# Patient Record
Sex: FEMALE | Race: WHITE | HISPANIC OR LATINO | Employment: OTHER | URBAN - METROPOLITAN AREA
[De-identification: names, ages, dates, MRNs, and addresses within clinical notes are randomized per-mention and may not be internally consistent; named-entity substitution may affect disease eponyms.]

---

## 2019-09-03 ENCOUNTER — TRANSFERRED RECORDS (OUTPATIENT)
Dept: HEALTH INFORMATION MANAGEMENT | Facility: CLINIC | Age: 69
End: 2019-09-03

## 2021-07-21 ENCOUNTER — TRANSFERRED RECORDS (OUTPATIENT)
Dept: HEALTH INFORMATION MANAGEMENT | Facility: CLINIC | Age: 71
End: 2021-07-21
Payer: COMMERCIAL

## 2021-07-21 ENCOUNTER — APPOINTMENT (OUTPATIENT)
Dept: INTERNAL MEDICINE | Facility: CLINIC | Age: 71
End: 2021-07-21
Payer: MEDICARE

## 2021-07-21 VITALS
OXYGEN SATURATION: 98 % | HEIGHT: 57 IN | TEMPERATURE: 96.7 F | HEART RATE: 78 BPM | BODY MASS INDEX: 26.75 KG/M2 | WEIGHT: 124 LBS

## 2021-07-21 DIAGNOSIS — Z86.69 PERSONAL HISTORY OF OTHER DISEASES OF THE NERVOUS SYSTEM AND SENSE ORGANS: ICD-10-CM

## 2021-07-21 DIAGNOSIS — Z78.9 OTHER SPECIFIED HEALTH STATUS: ICD-10-CM

## 2021-07-21 DIAGNOSIS — Z83.3 FAMILY HISTORY OF DIABETES MELLITUS: ICD-10-CM

## 2021-07-21 DIAGNOSIS — Z00.00 ENCOUNTER FOR GENERAL ADULT MEDICAL EXAMINATION W/OUT ABNORMAL FINDINGS: ICD-10-CM

## 2021-07-21 DIAGNOSIS — Z86.39 PERSONAL HISTORY OF OTHER ENDOCRINE, NUTRITIONAL AND METABOLIC DISEASE: ICD-10-CM

## 2021-07-21 DIAGNOSIS — J06.9 ACUTE UPPER RESPIRATORY INFECTION, UNSPECIFIED: ICD-10-CM

## 2021-07-21 DIAGNOSIS — L30.9 DERMATITIS, UNSPECIFIED: ICD-10-CM

## 2021-07-21 PROCEDURE — 99072 ADDL SUPL MATRL&STAF TM PHE: CPT

## 2021-07-21 PROCEDURE — 99205 OFFICE O/P NEW HI 60 MIN: CPT | Mod: 25

## 2021-07-21 PROCEDURE — 36415 COLL VENOUS BLD VENIPUNCTURE: CPT

## 2021-07-21 PROCEDURE — 93000 ELECTROCARDIOGRAM COMPLETE: CPT | Mod: 59

## 2021-07-21 RX ORDER — CEPHALEXIN 500 MG/1
500 CAPSULE ORAL TWICE DAILY
Refills: 0 | Status: DISCONTINUED | COMMUNITY

## 2021-07-21 RX ORDER — TRIAMCINOLONE ACETONIDE 1 MG/G
0.1 CREAM TOPICAL TWICE DAILY
Qty: 1 | Refills: 2 | Status: ACTIVE | COMMUNITY
Start: 2021-07-21 | End: 1900-01-01

## 2021-07-22 VITALS — SYSTOLIC BLOOD PRESSURE: 126 MMHG | DIASTOLIC BLOOD PRESSURE: 80 MMHG

## 2021-07-22 PROBLEM — J06.9 UPPER RESPIRATORY TRACT INFECTION: Status: ACTIVE | Noted: 2021-07-21

## 2021-07-22 PROBLEM — Z86.69 HISTORY OF PARKINSON'S DISEASE: Status: RESOLVED | Noted: 2021-07-22 | Resolved: 2021-07-22

## 2021-07-22 PROBLEM — Z86.39 HISTORY OF TYPE 2 DIABETES MELLITUS: Status: RESOLVED | Noted: 2021-07-21 | Resolved: 2021-07-22

## 2021-07-22 PROBLEM — Z78.9 CONSUMES ALCOHOL AT SOCIAL EVENTS: Status: ACTIVE | Noted: 2021-07-21

## 2021-07-22 PROBLEM — Z86.39 HISTORY OF THYROID DISORDER: Status: RESOLVED | Noted: 2021-07-21 | Resolved: 2021-07-22

## 2021-07-22 PROBLEM — Z83.3 FAMILY HISTORY OF DIABETES MELLITUS: Status: ACTIVE | Noted: 2021-07-21

## 2021-07-22 PROBLEM — L30.9 DERMATITIS: Status: ACTIVE | Noted: 2021-07-21

## 2021-07-22 RX ORDER — CARBIDOPA AND LEVODOPA 25; 100 MG/1; MG/1
25-100 TABLET, EXTENDED RELEASE ORAL
Qty: 60 | Refills: 0 | Status: ACTIVE | COMMUNITY
Start: 2021-03-12

## 2021-07-22 RX ORDER — ROSUVASTATIN CALCIUM 5 MG/1
5 TABLET, FILM COATED ORAL
Refills: 0 | Status: ACTIVE | COMMUNITY

## 2021-07-22 NOTE — HEALTH RISK ASSESSMENT
[Fair] : ~his/her~ current health as fair  [Good] : ~his/her~  mood as  good [Intercurrent ED visits] : went to ED [Yes] : Yes [Monthly or less (1 pt)] : Monthly or less (1 point) [1 or 2 (0 pts)] : 1 or 2 (0 points) [Never (0 pts)] : Never (0 points) [No] : In the past 12 months have you used drugs other than those required for medical reasons? No [No falls in past year] : Patient reported no falls in the past year [0] : 2) Feeling down, depressed, or hopeless: Not at all (0) [Patient reported mammogram was normal] : Patient reported mammogram was normal [Patient reported PAP Smear was normal] : Patient reported PAP Smear was normal [Patient reported colonoscopy was normal] : Patient reported colonoscopy was normal [HIV test declined] : HIV test declined [Hepatitis C test declined] : Hepatitis C test declined [Alone] : lives alone [Retired] : retired [] :  [Smoke Detector] : smoke detector [Carbon Monoxide Detector] : carbon monoxide detector [Seat Belt] :  uses seat belt [Sunscreen] : uses sunscreen [] : No [de-identified] : 07/07/2021 [de-identified] : No [de-identified] : wine [Audit-CScore] : 1 [de-identified] : None [de-identified] : Healthy  [[id number]] : 0 [Reports changes in hearing] : Reports no changes in hearing [Reports changes in vision] : Reports no changes in vision [Reports changes in dental health] : Reports no changes in dental health [MammogramDate] : 06/20 [PapSmearDate] : 04/21 [ColonoscopyDate] : 03/21

## 2021-07-22 NOTE — PHYSICAL EXAM
[No Acute Distress] : no acute distress [Normal Oropharynx] : the oropharynx was normal [No JVD] : no jugular venous distention [No Accessory Muscle Use] : no accessory muscle use [Clear to Auscultation] : lungs were clear to auscultation bilaterally [Regular Rhythm] : with a regular rhythm [Normal S1, S2] : normal S1 and S2 [No Edema] : there was no peripheral edema [No Masses] : no palpable masses [Soft] : abdomen soft [Non Tender] : non-tender [No CVA Tenderness] : no CVA  tenderness [No Joint Swelling] : no joint swelling [No Focal Deficits] : no focal deficits [Alert and Oriented x3] : oriented to person, place, and time [de-identified] : appears anxious, pressured speech [de-identified] : mild pdaryngeal erythema [de-identified] : multiple oval superficial skin ulcers arms and legs

## 2021-07-22 NOTE — REVIEW OF SYSTEMS
[Earache] : earache [Memory Loss] : memory loss [Anxiety] : anxiety [Fever] : no fever [Chest Pain] : no chest pain [Shortness Of Breath] : no shortness of breath [Abdominal Pain] : no abdominal pain [Dysuria] : no dysuria [Unsteady Walking] : no ataxia [FreeTextEntry4] : went to ENT no clear diagnosis

## 2021-07-22 NOTE — PLAN
[FreeTextEntry1] : blood sent for routine labs\par complex history will ask for old records from Dr Chau

## 2021-07-22 NOTE — HISTORY OF PRESENT ILLNESS
[FreeTextEntry1] : former patient Dr Chau wishes to transfer care [de-identified] : 69 yo female taking multiple meds however not completely clear which meds actually taking\par there is a prior hx diabetes on lantus/trulicity, Parkinson,s on sinemet,hypothyrodism on synthroid.\par multiple complaints mostly center on upper resp infection for whlch was given keflex and claritin apparently at Urgent Care

## 2021-07-22 NOTE — ASSESSMENT
[FreeTextEntry1] : complicated history\par called pharmacy re list of meds however not completely which meds actually taking.\par today requests refill triamcinolone cream given for rash arms and legs[will be seeing dermatologist within next few days]  \par currently appears stable  thinking with loose associations ? PRIOR PSYCH DIAGNOSIS

## 2021-07-25 LAB
ALBUMIN SERPL ELPH-MCNC: 4.2 G/DL
ALP BLD-CCNC: 121 U/L
ALT SERPL-CCNC: 25 U/L
ANION GAP SERPL CALC-SCNC: 12 MMOL/L
AST SERPL-CCNC: 21 U/L
BASOPHILS # BLD AUTO: 0.06 K/UL
BASOPHILS NFR BLD AUTO: 1.1 %
BILIRUB SERPL-MCNC: 0.6 MG/DL
BUN SERPL-MCNC: 12 MG/DL
CALCIUM SERPL-MCNC: 8.9 MG/DL
CHLORIDE SERPL-SCNC: 104 MMOL/L
CHOLEST SERPL-MCNC: 163 MG/DL
CO2 SERPL-SCNC: 23 MMOL/L
CREAT SERPL-MCNC: 0.54 MG/DL
EOSINOPHIL # BLD AUTO: 0.14 K/UL
EOSINOPHIL NFR BLD AUTO: 2.5 %
ESTIMATED AVERAGE GLUCOSE: 226 MG/DL
GLUCOSE SERPL-MCNC: 208 MG/DL
HBA1C MFR BLD HPLC: 9.5 %
HCT VFR BLD CALC: 39.6 %
HDLC SERPL-MCNC: 55 MG/DL
HGB BLD-MCNC: 11.9 G/DL
IMM GRANULOCYTES NFR BLD AUTO: 0.2 %
LDLC SERPL CALC-MCNC: 74 MG/DL
LYMPHOCYTES # BLD AUTO: 1.73 K/UL
LYMPHOCYTES NFR BLD AUTO: 31.3 %
MAN DIFF?: NORMAL
MCHC RBC-ENTMCNC: 28.5 PG
MCHC RBC-ENTMCNC: 30.1 GM/DL
MCV RBC AUTO: 94.7 FL
MONOCYTES # BLD AUTO: 0.33 K/UL
MONOCYTES NFR BLD AUTO: 6 %
NEUTROPHILS # BLD AUTO: 3.25 K/UL
NEUTROPHILS NFR BLD AUTO: 58.9 %
NONHDLC SERPL-MCNC: 108 MG/DL
PLATELET # BLD AUTO: 230 K/UL
POTASSIUM SERPL-SCNC: 3.5 MMOL/L
PROT SERPL-MCNC: 7.1 G/DL
RBC # BLD: 4.18 M/UL
RBC # FLD: 12.7 %
SODIUM SERPL-SCNC: 139 MMOL/L
TRIGL SERPL-MCNC: 169 MG/DL
TSH SERPL-ACNC: 1.45 UIU/ML
WBC # FLD AUTO: 5.52 K/UL

## 2021-08-20 ENCOUNTER — APPOINTMENT (OUTPATIENT)
Dept: INTERNAL MEDICINE | Facility: CLINIC | Age: 71
End: 2021-08-20
Payer: MEDICARE

## 2021-08-20 VITALS — HEART RATE: 88 BPM | BODY MASS INDEX: 25.54 KG/M2 | WEIGHT: 118 LBS | OXYGEN SATURATION: 98 %

## 2021-08-20 DIAGNOSIS — Z86.59 PERSONAL HISTORY OF OTHER MENTAL AND BEHAVIORAL DISORDERS: ICD-10-CM

## 2021-08-20 PROCEDURE — 36415 COLL VENOUS BLD VENIPUNCTURE: CPT

## 2021-08-20 PROCEDURE — 99214 OFFICE O/P EST MOD 30 MIN: CPT | Mod: 25

## 2021-08-22 PROBLEM — Z86.59 HISTORY OF DEPRESSION: Status: RESOLVED | Noted: 2021-07-21 | Resolved: 2021-08-22

## 2021-08-22 NOTE — PLAN
[FreeTextEntry1] : blood sent for routine to include A1C\par To get CT abd/pelvis to assess etiology weight loss and abd pain

## 2021-08-22 NOTE — ASSESSMENT
[FreeTextEntry1] : multiple issues raised\par most prominent being weight loss and intermittent LLQ abd pain\par colonoscopy one year about was reportedly normal

## 2021-08-22 NOTE — REVIEW OF SYSTEMS
[Anxiety] : anxiety [Depression] : depression [Fever] : no fever [Earache] : no earache [Chest Pain] : no chest pain [Shortness Of Breath] : no shortness of breath [Abdominal Pain] : no abdominal pain [Nausea] : no nausea [Constipation] : no constipation [Joint Pain] : no joint pain [Headache] : no headache [Dizziness] : no dizziness

## 2021-08-22 NOTE — PHYSICAL EXAM
[No Acute Distress] : no acute distress [No JVD] : no jugular venous distention [Clear to Auscultation] : lungs were clear to auscultation bilaterally [Regular Rhythm] : with a regular rhythm [No Edema] : there was no peripheral edema [Soft] : abdomen soft [Non Tender] : non-tender [Non-distended] : non-distended [No Masses] : no abdominal mass palpated [Normal Supraclavicular Nodes] : no supraclavicular lymphadenopathy [No CVA Tenderness] : no CVA  tenderness [Normal Gait] : normal gait [Alert and Oriented x3] : oriented to person, place, and time

## 2021-08-22 NOTE — HISTORY OF PRESENT ILLNESS
----- Message from Jigar Barr sent at 6/14/2021 10:09 AM CDT -----  Regarding: OSFR  Outside Film Request    Patient Name:  Ronak Ovalle  MRN:  [id number]    Type of Images/Reports requested:  X-Ray  Approximate date of prior imaging:10-13-14, CXR       Prior Imaging Facility:[facility]    Thank you! [FreeTextEntry1] : Pt. stated that she is experiencing lower abdominal pain since last week and the pain comes and goes. Also she stated that both of her eyes are itching and she doesn't believe that it is allergies. So she will explain to the doctor. [de-identified] : returns for routine f/u. \par concerned about unintentional weight loss lost weight since last visit\par no associated nausea,vomiting. as noted had LLQ abdominal pain however this has currently recolved\par

## 2021-08-25 LAB
ALBUMIN SERPL ELPH-MCNC: 4.4 G/DL
ALP BLD-CCNC: 117 U/L
ALT SERPL-CCNC: 23 U/L
ANION GAP SERPL CALC-SCNC: 11 MMOL/L
AST SERPL-CCNC: 21 U/L
BASOPHILS # BLD AUTO: 0.04 K/UL
BASOPHILS NFR BLD AUTO: 0.6 %
BILIRUB SERPL-MCNC: 0.5 MG/DL
BUN SERPL-MCNC: 16 MG/DL
CALCIUM SERPL-MCNC: 9 MG/DL
CHLORIDE SERPL-SCNC: 107 MMOL/L
CHOLEST SERPL-MCNC: 127 MG/DL
CO2 SERPL-SCNC: 23 MMOL/L
CREAT SERPL-MCNC: 0.49 MG/DL
EOSINOPHIL # BLD AUTO: 0.09 K/UL
EOSINOPHIL NFR BLD AUTO: 1.4 %
ESTIMATED AVERAGE GLUCOSE: 197 MG/DL
GLUCOSE SERPL-MCNC: 115 MG/DL
HBA1C MFR BLD HPLC: 8.5 %
HCT VFR BLD CALC: 39.8 %
HDLC SERPL-MCNC: 54 MG/DL
HGB BLD-MCNC: 12.1 G/DL
IMM GRANULOCYTES NFR BLD AUTO: 0.3 %
LDLC SERPL CALC-MCNC: 46 MG/DL
LYMPHOCYTES # BLD AUTO: 2.15 K/UL
LYMPHOCYTES NFR BLD AUTO: 32.4 %
MAN DIFF?: NORMAL
MCHC RBC-ENTMCNC: 28.7 PG
MCHC RBC-ENTMCNC: 30.4 GM/DL
MCV RBC AUTO: 94.5 FL
MONOCYTES # BLD AUTO: 0.37 K/UL
MONOCYTES NFR BLD AUTO: 5.6 %
NEUTROPHILS # BLD AUTO: 3.96 K/UL
NEUTROPHILS NFR BLD AUTO: 59.7 %
NONHDLC SERPL-MCNC: 73 MG/DL
PLATELET # BLD AUTO: 229 K/UL
POTASSIUM SERPL-SCNC: 3.6 MMOL/L
PROT SERPL-MCNC: 7.4 G/DL
RBC # BLD: 4.21 M/UL
RBC # FLD: 13.6 %
SODIUM SERPL-SCNC: 142 MMOL/L
TRIGL SERPL-MCNC: 133 MG/DL
TSH SERPL-ACNC: 0.06 UIU/ML
WBC # FLD AUTO: 6.63 K/UL

## 2021-10-05 ENCOUNTER — TRANSFERRED RECORDS (OUTPATIENT)
Dept: HEALTH INFORMATION MANAGEMENT | Facility: CLINIC | Age: 71
End: 2021-10-05
Payer: COMMERCIAL

## 2021-10-08 ENCOUNTER — TRANSFERRED RECORDS (OUTPATIENT)
Dept: HEALTH INFORMATION MANAGEMENT | Facility: CLINIC | Age: 71
End: 2021-10-08
Payer: COMMERCIAL

## 2021-10-08 ENCOUNTER — APPOINTMENT (OUTPATIENT)
Dept: INTERNAL MEDICINE | Facility: CLINIC | Age: 71
End: 2021-10-08
Payer: MEDICARE

## 2021-10-08 VITALS
OXYGEN SATURATION: 98 % | WEIGHT: 116 LBS | HEIGHT: 57 IN | HEART RATE: 79 BPM | DIASTOLIC BLOOD PRESSURE: 71 MMHG | TEMPERATURE: 97.5 F | SYSTOLIC BLOOD PRESSURE: 176 MMHG | BODY MASS INDEX: 25.03 KG/M2

## 2021-10-08 DIAGNOSIS — E03.9 HYPOTHYROIDISM, UNSPECIFIED: ICD-10-CM

## 2021-10-08 DIAGNOSIS — I10 ESSENTIAL (PRIMARY) HYPERTENSION: ICD-10-CM

## 2021-10-08 DIAGNOSIS — R63.4 ABNORMAL WEIGHT LOSS: ICD-10-CM

## 2021-10-08 DIAGNOSIS — F41.9 ANXIETY DISORDER, UNSPECIFIED: ICD-10-CM

## 2021-10-08 DIAGNOSIS — E11.9 TYPE 2 DIABETES MELLITUS W/OUT COMPLICATIONS: ICD-10-CM

## 2021-10-08 PROCEDURE — 99215 OFFICE O/P EST HI 40 MIN: CPT

## 2021-10-08 RX ORDER — INSULIN GLARGINE 100 [IU]/ML
100 INJECTION, SOLUTION SUBCUTANEOUS DAILY
Qty: 3 | Refills: 0 | Status: ACTIVE | COMMUNITY
Start: 2021-07-21 | End: 1900-01-01

## 2021-10-08 RX ORDER — LEVOTHYROXINE SODIUM 0.1 MG/1
100 TABLET ORAL DAILY
Qty: 90 | Refills: 1 | Status: ACTIVE | COMMUNITY
Start: 2021-07-21 | End: 1900-01-01

## 2021-10-08 RX ORDER — CIPROFLOXACIN 3 MG/ML
0.3 SOLUTION OPHTHALMIC TWICE DAILY
Qty: 1 | Refills: 0 | Status: ACTIVE | COMMUNITY
Start: 2021-10-08 | End: 1900-01-01

## 2021-10-08 RX ORDER — AMLODIPINE BESYLATE 5 MG/1
5 TABLET ORAL DAILY
Qty: 60 | Refills: 3 | Status: ACTIVE | COMMUNITY
Start: 2021-10-08 | End: 1900-01-01

## 2021-10-08 RX ORDER — LEVOTHYROXINE SODIUM 0.1 MG/1
100 TABLET ORAL DAILY
Qty: 90 | Refills: 1 | Status: ACTIVE | COMMUNITY
Start: 2021-10-08 | End: 1900-01-01

## 2021-10-09 PROBLEM — F41.9 ANXIETY: Status: ACTIVE | Noted: 2021-08-22

## 2021-10-09 PROBLEM — R63.4 WEIGHT LOSS, UNINTENTIONAL: Status: ACTIVE | Noted: 2021-08-20

## 2021-10-09 PROBLEM — E11.9 CONTROLLED DIABETES MELLITUS: Status: ACTIVE | Noted: 2021-08-20

## 2021-10-09 RX ORDER — LANCETS 33 GAUGE
EACH MISCELLANEOUS
Qty: 100 | Refills: 0 | Status: ACTIVE | COMMUNITY
Start: 2021-04-20

## 2021-10-09 RX ORDER — TRIAMCINOLONE ACETONIDE 0.25 MG/G
0.03 CREAM TOPICAL
Qty: 80 | Refills: 0 | Status: ACTIVE | COMMUNITY
Start: 2021-08-13

## 2021-10-09 RX ORDER — BLOOD SUGAR DIAGNOSTIC
STRIP MISCELLANEOUS
Qty: 100 | Refills: 0 | Status: ACTIVE | COMMUNITY
Start: 2021-04-20

## 2021-10-09 RX ORDER — PEN NEEDLE, DIABETIC 32GX 5/32"
32G X 4 MM NEEDLE, DISPOSABLE MISCELLANEOUS
Qty: 100 | Refills: 0 | Status: ACTIVE | COMMUNITY
Start: 2021-05-19

## 2021-10-09 RX ORDER — BETAMETHASONE DIPROPIONATE 0.5 MG/G
0.05 CREAM TOPICAL
Qty: 45 | Refills: 0 | Status: ACTIVE | COMMUNITY
Start: 2021-09-03

## 2021-10-09 RX ORDER — ERTUGLIFLOZIN 5 MG/1
5 TABLET, FILM COATED ORAL
Qty: 30 | Refills: 0 | Status: ACTIVE | COMMUNITY
Start: 2021-03-12

## 2021-10-09 NOTE — REVIEW OF SYSTEMS
[Recent Change In Weight] : ~T recent weight change [Anxiety] : anxiety [Depression] : depression [Fever] : no fever [Night Sweats] : no night sweats [Earache] : no earache [Chest Pain] : no chest pain [Shortness Of Breath] : no shortness of breath [Abdominal Pain] : no abdominal pain [Nausea] : no nausea [Constipation] : no constipation [Joint Pain] : no joint pain [Headache] : no headache [Dizziness] : no dizziness

## 2021-10-09 NOTE — ASSESSMENT
[FreeTextEntry1] : weight loss possibly related to iatrogenic hyperthyroidism-dose of synthroid was decresased\par recent mammo/CT a/p were essentially negative\par to see GI re ? abn upper stomach thickening on CT scan

## 2021-10-09 NOTE — HISTORY OF PRESENT ILLNESS
[FreeTextEntry1] : Returns for usual followup has no specific medical complaints, taking medications as noted.\par  [de-identified] : 2 pound weight loss last month states has been eating well\par appears anxious no interval change in status otherwise\par recent mammogram CT [a/p] were negative

## 2021-10-09 NOTE — PLAN
[FreeTextEntry1] : to lower dose synthroid to 100mcg/day\par aware need for short interval follow-up

## 2021-10-09 NOTE — HEALTH RISK ASSESSMENT
[Intercurrent ED visits] : went to ED [No] : In the past 12 months have you used drugs other than those required for medical reasons? No [No falls in past year] : Patient reported no falls in the past year [0] : 2) Feeling down, depressed, or hopeless: Not at all (0) [] : No [de-identified] : 09/03/2021 [de-identified] : None [de-identified] : Regular  [[id number]] : 0

## 2021-11-09 ENCOUNTER — TELEPHONE (OUTPATIENT)
Dept: BEHAVIORAL HEALTH | Facility: CLINIC | Age: 71
End: 2021-11-09
Payer: COMMERCIAL

## 2021-11-09 ENCOUNTER — HOSPITAL ENCOUNTER (EMERGENCY)
Facility: CLINIC | Age: 71
Discharge: PSYCHIATRIC OR SUBSTANCE USE FACILITY | End: 2021-11-09
Attending: EMERGENCY MEDICINE | Admitting: EMERGENCY MEDICINE
Payer: COMMERCIAL

## 2021-11-09 ENCOUNTER — HOSPITAL ENCOUNTER (INPATIENT)
Facility: CLINIC | Age: 71
LOS: 23 days | Discharge: HOME | DRG: 885 | End: 2021-12-02
Attending: PSYCHIATRY & NEUROLOGY | Admitting: PSYCHIATRY & NEUROLOGY
Payer: COMMERCIAL

## 2021-11-09 VITALS
RESPIRATION RATE: 16 BRPM | HEART RATE: 73 BPM | WEIGHT: 113.54 LBS | DIASTOLIC BLOOD PRESSURE: 79 MMHG | TEMPERATURE: 97.2 F | OXYGEN SATURATION: 99 % | SYSTOLIC BLOOD PRESSURE: 136 MMHG

## 2021-11-09 DIAGNOSIS — E03.9 ACQUIRED HYPOTHYROIDISM: ICD-10-CM

## 2021-11-09 DIAGNOSIS — F20.0 PARANOID SCHIZOPHRENIA, CHRONIC CONDITION WITH ACUTE EXACERBATION (H): Primary | ICD-10-CM

## 2021-11-09 DIAGNOSIS — Z79.4 TYPE 2 DIABETES MELLITUS WITHOUT COMPLICATION, WITH LONG-TERM CURRENT USE OF INSULIN (H): ICD-10-CM

## 2021-11-09 DIAGNOSIS — E78.5 HYPERLIPIDEMIA LDL GOAL <130: ICD-10-CM

## 2021-11-09 DIAGNOSIS — E11.9 TYPE 2 DIABETES MELLITUS WITHOUT COMPLICATION, WITH LONG-TERM CURRENT USE OF INSULIN (H): ICD-10-CM

## 2021-11-09 DIAGNOSIS — F20.0 PARANOID SCHIZOPHRENIA (H): ICD-10-CM

## 2021-11-09 DIAGNOSIS — I10 PRIMARY HYPERTENSION: ICD-10-CM

## 2021-11-09 PROBLEM — F29 PSYCHOSIS (H): Status: ACTIVE | Noted: 2021-11-09

## 2021-11-09 LAB
AMPHETAMINES UR QL SCN: NORMAL
BARBITURATES UR QL: NORMAL
BENZODIAZ UR QL: NORMAL
CANNABINOIDS UR QL SCN: NORMAL
COCAINE UR QL: NORMAL
OPIATES UR QL SCN: NORMAL
SARS-COV-2 RNA RESP QL NAA+PROBE: NEGATIVE

## 2021-11-09 PROCEDURE — 96372 THER/PROPH/DIAG INJ SC/IM: CPT | Performed by: EMERGENCY MEDICINE

## 2021-11-09 PROCEDURE — 99285 EMERGENCY DEPT VISIT HI MDM: CPT | Mod: 25

## 2021-11-09 PROCEDURE — 90791 PSYCH DIAGNOSTIC EVALUATION: CPT

## 2021-11-09 PROCEDURE — C9803 HOPD COVID-19 SPEC COLLECT: HCPCS

## 2021-11-09 PROCEDURE — 250N000011 HC RX IP 250 OP 636: Performed by: EMERGENCY MEDICINE

## 2021-11-09 PROCEDURE — 128N000001 HC R&B CD/MH ADULT

## 2021-11-09 PROCEDURE — 250N000013 HC RX MED GY IP 250 OP 250 PS 637: Performed by: EMERGENCY MEDICINE

## 2021-11-09 PROCEDURE — 80307 DRUG TEST PRSMV CHEM ANLYZR: CPT | Performed by: EMERGENCY MEDICINE

## 2021-11-09 PROCEDURE — 87635 SARS-COV-2 COVID-19 AMP PRB: CPT | Performed by: EMERGENCY MEDICINE

## 2021-11-09 RX ORDER — OLANZAPINE 5 MG/1
5 TABLET ORAL ONCE
Status: DISCONTINUED | OUTPATIENT
Start: 2021-11-09 | End: 2021-11-09 | Stop reason: HOSPADM

## 2021-11-09 RX ORDER — OLANZAPINE 10 MG/2ML
10 INJECTION, POWDER, FOR SOLUTION INTRAMUSCULAR ONCE
Status: COMPLETED | OUTPATIENT
Start: 2021-11-09 | End: 2021-11-09

## 2021-11-09 RX ORDER — GABAPENTIN 100 MG/1
100 CAPSULE ORAL EVERY 6 HOURS PRN
Status: DISCONTINUED | OUTPATIENT
Start: 2021-11-09 | End: 2021-12-02 | Stop reason: HOSPADM

## 2021-11-09 RX ORDER — OLANZAPINE 5 MG/1
10 TABLET, ORALLY DISINTEGRATING ORAL ONCE
Status: COMPLETED | OUTPATIENT
Start: 2021-11-09 | End: 2021-11-09

## 2021-11-09 RX ORDER — LORAZEPAM 1 MG/1
1 TABLET ORAL ONCE
Status: DISCONTINUED | OUTPATIENT
Start: 2021-11-09 | End: 2021-11-09 | Stop reason: HOSPADM

## 2021-11-09 RX ORDER — OLANZAPINE 10 MG/2ML
5 INJECTION, POWDER, FOR SOLUTION INTRAMUSCULAR 3 TIMES DAILY PRN
Status: DISCONTINUED | OUTPATIENT
Start: 2021-11-09 | End: 2021-12-02 | Stop reason: HOSPADM

## 2021-11-09 RX ORDER — OLANZAPINE 5 MG/1
5 TABLET ORAL 3 TIMES DAILY PRN
Status: DISCONTINUED | OUTPATIENT
Start: 2021-11-09 | End: 2021-12-02 | Stop reason: HOSPADM

## 2021-11-09 RX ORDER — MAGNESIUM HYDROXIDE/ALUMINUM HYDROXICE/SIMETHICONE 120; 1200; 1200 MG/30ML; MG/30ML; MG/30ML
30 SUSPENSION ORAL EVERY 4 HOURS PRN
Status: DISCONTINUED | OUTPATIENT
Start: 2021-11-09 | End: 2021-12-02 | Stop reason: HOSPADM

## 2021-11-09 RX ORDER — TRAZODONE HYDROCHLORIDE 50 MG/1
50 TABLET, FILM COATED ORAL
Status: DISCONTINUED | OUTPATIENT
Start: 2021-11-09 | End: 2021-12-02 | Stop reason: HOSPADM

## 2021-11-09 RX ORDER — POLYETHYLENE GLYCOL 3350 17 G/17G
17 POWDER, FOR SOLUTION ORAL DAILY PRN
Status: DISCONTINUED | OUTPATIENT
Start: 2021-11-09 | End: 2021-12-02 | Stop reason: HOSPADM

## 2021-11-09 RX ORDER — ACETAMINOPHEN 325 MG/1
650 TABLET ORAL EVERY 4 HOURS PRN
Status: DISCONTINUED | OUTPATIENT
Start: 2021-11-09 | End: 2021-12-02 | Stop reason: HOSPADM

## 2021-11-09 RX ADMIN — OLANZAPINE 10 MG: 10 INJECTION, POWDER, FOR SOLUTION INTRAMUSCULAR at 20:44

## 2021-11-09 RX ADMIN — OLANZAPINE 5 MG: 5 TABLET, ORALLY DISINTEGRATING ORAL at 11:59

## 2021-11-09 RX ADMIN — OLANZAPINE 10 MG: 10 INJECTION, POWDER, FOR SOLUTION INTRAMUSCULAR at 15:36

## 2021-11-09 NOTE — TELEPHONE ENCOUNTER
R: 28[facility] ED: [phone]    514pm - Intake called ED, ensured that the pt will be on [facility]   553pm - Larry, on call provider, accepts   Pt placed in queue   602pm - unit charge notified, 730pm for report   605pm - ED notified

## 2021-11-09 NOTE — ED NOTES
Patient's brother in law, Sarbjit, left the bedside to go home. He requests that he be called for an update if patient transfers to a facility this evening. Sarbjit Marge: [phone]

## 2021-11-09 NOTE — ED PROVIDER NOTES
History   Chief Complaint:  No chief complaint on file.       HPI   Linnette Monsivais is a 71 year old female with a longstanding history of paranoid schizophrenia who arrives with brother-in-law for psychiatric evaluation.  He is reporting that she has been noncompliant with her medication since this spring and has been increasingly erratic and paranoid.  She lives in [place] and he arranged to have her come to [place] to assist with her mental health condition.  He is reporting that she is exhibiting bizarre and paranoid behavior, for instance walking around the house with a  knife and the lights on apparently concerned about intruders and too afraid to go to sleep.    Review of Systems   Unable to perform ROS: Psychiatric disorder     Allergies:  The patient has no known allergies.     Medications:  Noncompliant with medications    Past Medical History:     Paranoid schizophrenia    Social History:  The patient presents to the ED with brother in law.   Lives in [place].     Physical Exam     Patient Vitals for the past 24 hrs:   BP Temp Pulse Resp SpO2   11/09/21 1104 -- 97.2  F (36.2  C) -- -- --   11/09/21 1102 (!) 194/94 -- 92 -- 99 %   11/09/21 1100 -- -- 60 18 99 %       Physical Exam  General: Patient is alert and cooperative.  HENT:  No trauma.   Eyes: EOMI. Normal conjunctiva.  Neck:  Normal range of motion and appearance.   Cardiovascular:  Normal rate.   Pulmonary/Chest:  Effort normal  Abdominal: Soft. No distension or tenderness.     Musculoskeletal: Normal range of motion. No edema or tenderness.   Neurological: normal strength, sensation, and coordination.   Skin: Warm and dry. No rash or bruising.   Psychiatric: paranoid, erratic behavior    Emergency Department Course       Laboratory:      Emergency Department Course:  Reviewed:  I reviewed nursing notes and vitals    Assessments:  I obtained history and examined the patient as noted above.     I rechecked the patient and  explained findings.     Consults:  DEC    Interventions:  Medications   OLANZapine zydis (zyPREXA) ODT tab 10 mg (5 mg Oral Given 21 115)       Disposition:  Plan transfer inpatient Psych    Impression & Plan   Medical Decision Makin-year-old female with a history of paranoid schizophrenia has been noncompliant with medications and has presented in a decompensated state. She has been placed on CAN, medicated with oral Zyprexa, and a virtual DEC consultation was performed today recommended inpatient management and arrangements are underway for placement.    Diagnosis:    ICD-10-CM    1. Paranoid schizophrenia (H)  F20.0          Scribe Disclosure:  I, Marek Knutson, am serving as a scribe at 11:13 AM on 2021 to document services personally performed by Yuri Mercado MD based on my observations and the provider's statements to me.             Yuri Mercado MD  [id number]       Yuri Mercado MD  [id number]

## 2021-11-09 NOTE — ED NOTES
"Pt agitated, coming to the door, states, \"I need to leave now.\" Pt paranoid, states someone is following her.  Pt verbalizes a fear of being left alone. Pt requested that COVID swab be opened in front of her because she fears that RN put something on the q-tip. Brother and law and RN redirecting patient, using verbal de-escalation.   "

## 2021-11-09 NOTE — ED TRIAGE NOTES
Pt presents to ED with paranoia. Brother in law states they picked her up from [place] due to symptoms.   Walking around the house at night with a  knife, due to fear that someone is coming to get her. Checking doors all the time and turning the lights on. States she is not sleeping because she is afraid.

## 2021-11-09 NOTE — ED NOTES
Pt became increasingly agitated. Verbally threatening to leave, attempted to push through brother in law to leave the room. Dr. Cole notified. IM zyprexa given. Pt calm after medication given.

## 2021-11-09 NOTE — ED NOTES
Linnette Monsivais  November 9, 2021  SAFE Note    Critical Safety Issues: Pt is a 71 year old / female with a history of paranoid schizophrenia.  Pt was brought to the ER today by her brother in-law due to worsening of paranoia, difficulty sleeping, and daily functioning.  Pt denied having depression symptoms, but endorsed in anxiety symptoms.  Pt reported having poor sleep and loss of appetite.  Pt endorsed increased paranoia as she felt someone was watching her, following her and going to harm her or kill her.  Pt denied having auditory hallucination but endorsed visual hallucination of seeing someone going to harm her, and shadows on TV.  Pt denied having suicidal and homicidal ideations.  Pt originally from [place], but her sister and brother in-law brought her to [place], [place] to their home about a week ago to help her due to ongoing paranoia.  Pt shared she was in constant fear and worry that someone was going to harm her as she has not been able to sleep.  Pt has been pacing a lot, locking all the doors and walking around with a  knife in her hand at her sister's house to protect herself.  Pt noted if someone was trying to harm her or kill her, then she will protect herself by harming the other person who try to harm her.      Current Suicidal Ideation/Self-Injurious Concerns/Methods: None - N/A      Current or Historical Inappropriate Sexual Behavior: Unknown      Current or Historical Aggression/Homicidal Ideation: None - N/A  Pt noted if someone was trying to harm her or kill her, then she will protect herself by harming the other person who try to harm her.      Triggers: worsening of paranoia and medication non-adherence.    Updated care team: Yes: Yuri Mercado MD and [facility] intake.    For additional details see full [facility] assessment.       [facility] Tiwari [facility]

## 2021-11-09 NOTE — ED NOTES
11/9/2021  Linnette Monsivais 1950     [facility] Crisis Assessment:    Started at: 12:25pm  Completed at: 1:25pm  Patient was assessed via virtually ([employer] cart or other teleconferencing device).  Patient Location: [facility]    Chief Complaint and History of Presenting Problem:    Patient is a 71 year old  and / female who presented to the ED by Family/Friends related to concerns for worsening of paranoia, anxiety, difficulty sleeping and daily functioning.  Pt has a history of paranoid schizophrenia and medication non-adherence.  Pt was  and lived in [place] alone by herself.  Pt's sister and brother in-law concerned about Pt's well being as they brought her to their home in [place], [place] about a week ago to get help for her.  Pt reported she stopped taking her Zyprexa back in March of this year as the medication made her difficulty to move her arms and legs.  Pt denied having depression symptoms, but endorsed increased anxiety symptoms.  Pt reported difficulty sleeping and loss of appetite.  Pt endorsed increased paranoia as she felt someone was watching her, following her and going to kill her.  Pt noted being in a constant state of fear, as she cannnot sleep at night and pacing in her sister's home.  Pt also pacing with a  knife in her hand at her sister's home to protect herself from intruders.  Pt denied having auditory hallucination but endorsed in visual hallucination of seeing someone going to harm her and shadows on TV.  Pt denied having suicidal and homicidal ideations.  However, Pt noted she will protect herself by harming someone if someone was going to harm her.    Assessment and intervention involved meeting with pt, obtaining collateral from [facility] records and Yuri Mercado MD, employing crisis psychotherapy including: Establishing rapport, Active listening, Assess dimensions of crisis, Apply solution-focused therapy to address current  crisis, Identify additional supports and alternative coping skills, Motivational Interviewing, Brief Supportive Therapy and Trauma-Informed Care. Collateral information includes Pt's brother in-law, Sarbjit Arriaza [phone] who was present in the ER. Sarbjit reported Pt has not been sleeping at night due to increased paranoia and fear of someone going to harm her.  Sarbjit reported Pt has been pacing and walking back and forth a lot in his home.  Sarbjit reported Pt was walking with a  knife in her hand to protect herself and family from intruders.  Sarbjit preferred inpatient service for Pt for further stabilization, safety assessment and medication management.    Biopsychosocial Background and Demographic Information    Pt reported her both parents have passed away and had 4 siblings.  Pt noted two of her siblings also have passed away.  Pt reported she was  and has no children.  Pt reported she was living in [place] and alone as she was retired.  Pt shared she did not have much of daily routine and structure other than listening to music.     Mental Health History and Current Symptoms     Pt reported she was diagnosed with paranoid schizophrenia in 1998 in [place].  Pt denied having depression symptoms, but endorsed increased anxiety symptoms.  Pt reported difficulty sleeping and loss of appetite.  Pt endorsed increased paranoia as she felt someone was watching her, following her and going to kill her.  Pt noted being in a constant state of fear, as she cannnot sleep at night and pacing in her sister's home.  Pt also pacing with a  knife in her hand at her sister's home to protect herself from intruders.  Pt denied having auditory hallucination but endorsed in visual hallucination of seeing someone going to harm her and shadows on TV.  Pt denied having suicidal and homicidal ideations.  However, Pt noted she will protect herself by harming someone if someone was going to harm her.      Patient  identifies historical diagnoses of paranoid schizophrenia. At baseline, patient describes their mental health symptoms as struggling.     Mental Health History (prior psychiatric hospitalizations, civil commitments, programmatic care, etc):Pt reported a history of psychiatric hospitalization in 1998 in [place] for 40 days.  Family Mental and Chemical Health History: None reported.    Current and Historic Psychotropic Medications: Pt identified Zyprexa as her prescribed psychiatric medication, however, Pt stopped taking her Zyprexa in March of this year as it made her difficult to move her arms and legs.  Medication Adherent: No  Recent medication changes? No    Relevant Medical Concerns  Patient identifies concerns with completing ADLs? No  Patient can ambulate independently? Yes  Other medical health concerns? Yes and Pt identified a history of high cholesterol, thyroid and diabetes type 2 as her medical conditions.  History of concussion or TBI? No     Trauma History   Physical, Emotional, or Sexual abuse: No  Loss of a friend or family member to suicide: No  Other identified traumatic event or significant stressor: No and Pt did not identify any other triggers, other than increased paranoia.    Substance Use History and Treatments  None reported.    Drug screen/BAL/Breathalyzer Completed? No and being ordered.  Results: N/A     History of Suicidal Ideation, Suicide Attempts, Non-Suicidal Self Injury, and Risk Formulation:   Details of Current Ideation, Attempt(s), Plan(s): Pt denied having suicidal ideations, intent and plan.  Risk factors: increased paranoia, medication non-adherence, limited coping skills and living alone living alone, loss of relationship, separation/divorce, etc., poor interpersonal relationships, disengagement with or distrust of medical/mental health care and chronic pain and/or chronic illness.   Protective factors: resiliency and positive family support strong bond to  family/friends.  History and Prior Methods of Self-injury: None reported.  History of Suicide Attempts: None reported.    ESS-6  1.a. Over the past 2 weeks, have you had thoughts of killing yourself? No   1.b. Have you ever attempted to kill yourself and, if yes, when did this last happen? No  2. Recent or current suicide plan? No  3. Recent or current intent to act on ideation? No  4. Lifetime psychiatric hospitalization? Yes  5. Pattern of excessive substance use? No  6. Current irritability, agitation, or aggression? No  ESS-6 Score: Low risk.      Other Risk Areas  Aggressive/assumptive/homicidal risk factors: No   Sexually inappropriate behavior? No      Vulnerability to sexual exploitation? No     Clinical Presentation and Current Symptoms   Pt exchanged greetings and made eye contact with this writer.  Pt was wearing sun glasses and mask during her assessment.  Pt was adequately groomed, oriented, engaged and cooperative in her DEC assessment.  Pt presented with some rambling and pressured speech, constricted and anxious affect.  Pt also presented with paranoia as she refused to sign her DANNIE.  Pt became paranoid and agitated with this writer as she asked if this writer was a doctor or psychiatrist.  Pt asked this writer's educational background and provide a proof of licensed mental health professional.  Writer clarified to the Pt that he was not a psychiatrist nor a doctor.  Writer explained to the Pt that he was a licensed mental health professional and showed his license card as LMFT.  Pt became agitated and paranoid as she refused to talk to this writer unless he was either a psychiatrist or psychologist.  Writer explained to the Pt that he was a licensed mental health professional as LMFT and was similar to the psychologist in terms of job functioning.  Writer provided multiple prompts and reassurance to Pt to engage in her DEC Assessment.  Pt was able to calm down and complete her  "assessment.    Attention, Hyperactivity, and Impulsivity: Yes: Restless   Anxiety:Yes: Panic attacks and Generalized Symptoms: Agitation, Cognitive anxiety - feelings of doom, racing thoughts, difficulty concentrating , Excessive worry and Pacing    Behavioral Difficulties: Yes: Agitation   Mood Symptoms: Yes: Appetite change/weight change , Impaired concentration, Impaired decision making , Increased irritability/agitation and Sleep disturbance    Appetite: Yes: Loss of Appetite   Feeding and Eating: No  Interpersonal Functioning: Yes: Cognitive Distortions and Impaired Interpersonal Functioning  Learning Disabilities/Cognitive/Developmental Disorders: No   General Cognitive Impairments: Yes: Decision-Making and Judgment/Insight  If yes, see completed Mini-Cog Assessment below.  Sleep: Yes: Difficulty falling asleep  and Difficulty staying sleep    Psychosis: Yes: Hallucinations: Visual and Paranoia    Trauma: No       Mental Status Exam:  Affect: Constricted  Appearance: Appropriate   Attention Span/Concentration: Attentive    Eye Contact: Engaged and Variable  Fund of Knowledge: Appropriate   Language /Speech Content: Fluent  Language /Speech Volume: Normal   Language /Speech Rate/Productions: Normal and Pressured   Recent Memory: Variable  Remote Memory: Variable  Mood: Anxious   Orientation:   Person: Yes   Place: Yes  Time of Day: Yes   Date: Yes   Situation (Do they understand why they are here?): Yes and Answer: Pt remarked, \"I feel like someone was going to harm me or kill me and not feeling safe at home.\"    Psychomotor Behavior: Agitated and Normal   Thought Content: Clear, Hallucinations and Paranoia  Thought Form: Intact    Screeners (Remove if not applicable - complete as standard for individuals 65+)  Mini-Cog Assessment  Instructions:  1. Ask the patient to listen carefully and remember three unrelated words (ex. Table, apple, pham).  2. Ask the patient to draw a clock: first the [place], then the " numbers, then the hands at a specific time (ex. 11:10am).  3. Ask the patient to repeat the three words.    Number of Words Recalled: 3  Clock-Drawing Test: 2 (Normal)   Mini-Cog Total Score: 5  Details: Pt recalled all 3 words and adryan a normal clock.    Current Providers and Contact Information   Patient is her own legal guardian.     Primary Care Provider: Yes, Dr. Favian Schmidt, [place], [place]  Psychiatrist: Yes, Sharla Rodriguez [place]  Therapist: No  : No  CTSS or ARMHS: No  ACT Team: No  Other: No    Has an DANNIE been signed? No ; For coordination of care and treatment serfices; By: Pt refused to sign her DANNIE; Relationship to patient outpatient service providers..     Clinical Summary and Recommendations    Clinical summary of assessment (include strengths, protective factors, community resources, and assessment of vulnerability/risk): Pt identified her friends, sister and brother in-law as positive supports.  Pt identified listening to music, cooking and reading as leisure activities.  Pt was not able to identify additional mental health coping skills.      Pt is a 71 year old / female with a history of paranoia schizophrenia.  Pt reported she stopped taking her Zyprexa in March of this year as the medication made her difficult to move her arms and legs.  Pt was  and lived in [place] alone by herself, but her sister and brother in-law brought her to their home in [place], [place] about a week ago to get her help with metal health issues.  Pt has worsening of paranoia as she felt someone was watching her, following her and going to kill her.  Pt noted this felt real to her but other people were telling her that she was paranoid.  Pt has not been sleeping well due to constant paranoia and fear of someone was going to harm her.  Pt has been pacing a lot and walking around a  knife in her hand to protect herself from intruders at her sister's home.  Pt denied  having depression symptoms but endorsed anxiety symptoms.  Pt denied having auditory hallucination but endorsed visual hallucination of seeing someone going to harm her, and shadows on TV.  Pt denied having suicidal and homicidal ideations.  Pt denied having SIB, history of suicide attempt and access to firearms.  Pt has increased paranoia, medication non-adherence, limited coping skills, impaired daily functioning and self-care.  Pt was appropriate for inpatient service for further stabilization.      Diagnosis with F Codes:  Paranoid   schizophrenia  F20.0    Disposition  Attending provider, Yuri Mercado MD consulted and does  agree with recommended disposition which includes Inpatient Mental Health. Patient does not agree with recommended level of care. Pt preferred to return to her sister's home.  Pt's brother in-law and sister preferred inpatient service for Pt.     Details of final disposition include: Inpatient mental health . Pt being referred to inpatient service.     If Inpatient, is patient admitted voluntary? No, 72 hour hold [facility]  Patient aware of potential for transfer if there is not appropriate placement? NA  Patient is willing to travel outside of the [facility] for placement? NA   Central Intake Notified? Yes: Date: 11/9/21 Time: 2pm.      Duration of assessment time: 1.0 hrs    CPT code(s) utilized: 08490, up to 74 minutes      YOVANNY Xavier

## 2021-11-10 PROBLEM — Z91.199 PERSONAL HISTORY OF NONCOMPLIANCE WITH MEDICAL TREATMENT, PRESENTING HAZARDS TO HEALTH: Status: ACTIVE | Noted: 2021-11-10

## 2021-11-10 PROBLEM — E78.5 HYPERLIPIDEMIA LDL GOAL <130: Status: ACTIVE | Noted: 2021-11-10

## 2021-11-10 PROBLEM — F20.0 PARANOID SCHIZOPHRENIA, CHRONIC CONDITION WITH ACUTE EXACERBATION (H): Status: ACTIVE | Noted: 2021-11-10

## 2021-11-10 PROBLEM — E03.9 ACQUIRED HYPOTHYROIDISM: Status: ACTIVE | Noted: 2021-11-10

## 2021-11-10 PROBLEM — E11.9 TYPE 2 DIABETES MELLITUS WITHOUT COMPLICATION, WITH LONG-TERM CURRENT USE OF INSULIN (H): Status: ACTIVE | Noted: 2021-11-10

## 2021-11-10 PROBLEM — Z79.4 TYPE 2 DIABETES MELLITUS WITHOUT COMPLICATION, WITH LONG-TERM CURRENT USE OF INSULIN (H): Status: ACTIVE | Noted: 2021-11-10

## 2021-11-10 LAB
ALBUMIN SERPL-MCNC: 3.1 G/DL (ref 3.5–5)
ALP SERPL-CCNC: 108 U/L (ref 45–120)
ALT SERPL W P-5'-P-CCNC: 20 U/L (ref 0–45)
ANION GAP SERPL CALCULATED.3IONS-SCNC: 6 MMOL/L (ref 5–18)
AST SERPL W P-5'-P-CCNC: 18 U/L (ref 0–40)
ATRIAL RATE - MUSE: 76 BPM
BASOPHILS # BLD AUTO: 0.1 10E3/UL (ref 0–0.2)
BASOPHILS NFR BLD AUTO: 1 %
BILIRUB SERPL-MCNC: 0.8 MG/DL (ref 0–1)
BUN SERPL-MCNC: 25 MG/DL (ref 8–28)
CALCIUM SERPL-MCNC: 9.3 MG/DL (ref 8.5–10.5)
CHLORIDE BLD-SCNC: 105 MMOL/L (ref 98–107)
CHOLEST SERPL-MCNC: 174 MG/DL
CO2 SERPL-SCNC: 30 MMOL/L (ref 22–31)
CREAT SERPL-MCNC: 0.74 MG/DL (ref 0.6–1.1)
DIASTOLIC BLOOD PRESSURE - MUSE: NORMAL MMHG
EOSINOPHIL # BLD AUTO: 0.2 10E3/UL (ref 0–0.7)
EOSINOPHIL NFR BLD AUTO: 3 %
ERYTHROCYTE [DISTWIDTH] IN BLOOD BY AUTOMATED COUNT: 13.6 % (ref 10–15)
GFR SERPL CREATININE-BSD FRML MDRD: 82 ML/MIN/1.73M2
GLUCOSE BLD-MCNC: 115 MG/DL (ref 70–125)
GLUCOSE BLDC GLUCOMTR-MCNC: 204 MG/DL (ref 70–99)
GLUCOSE BLDC GLUCOMTR-MCNC: 385 MG/DL (ref 70–99)
HBA1C MFR BLD: 9.3 %
HCT VFR BLD AUTO: 38.9 % (ref 35–47)
HDLC SERPL-MCNC: 78 MG/DL
HGB BLD-MCNC: 13.1 G/DL (ref 11.7–15.7)
IMM GRANULOCYTES # BLD: 0 10E3/UL
IMM GRANULOCYTES NFR BLD: 0 %
INTERPRETATION ECG - MUSE: NORMAL
LDLC SERPL CALC-MCNC: 80 MG/DL
LYMPHOCYTES # BLD AUTO: 2.6 10E3/UL (ref 0.8–5.3)
LYMPHOCYTES NFR BLD AUTO: 38 %
MCH RBC QN AUTO: 30.3 PG (ref 26.5–33)
MCHC RBC AUTO-ENTMCNC: 33.7 G/DL (ref 31.5–36.5)
MCV RBC AUTO: 90 FL (ref 78–100)
MONOCYTES # BLD AUTO: 0.4 10E3/UL (ref 0–1.3)
MONOCYTES NFR BLD AUTO: 6 %
NEUTROPHILS # BLD AUTO: 3.5 10E3/UL (ref 1.6–8.3)
NEUTROPHILS NFR BLD AUTO: 52 %
NRBC # BLD AUTO: 0 10E3/UL
NRBC BLD AUTO-RTO: 0 /100
P AXIS - MUSE: 40 DEGREES
PLATELET # BLD AUTO: 226 10E3/UL (ref 150–450)
POTASSIUM BLD-SCNC: 3.9 MMOL/L (ref 3.5–5)
PR INTERVAL - MUSE: 140 MS
PROT SERPL-MCNC: 6.5 G/DL (ref 6–8)
QRS DURATION - MUSE: 74 MS
QT - MUSE: 394 MS
QTC - MUSE: 443 MS
R AXIS - MUSE: -32 DEGREES
RBC # BLD AUTO: 4.33 10E6/UL (ref 3.8–5.2)
SODIUM SERPL-SCNC: 141 MMOL/L (ref 136–145)
SYSTOLIC BLOOD PRESSURE - MUSE: NORMAL MMHG
T AXIS - MUSE: 48 DEGREES
TRIGL SERPL-MCNC: 82 MG/DL
TSH SERPL DL<=0.005 MIU/L-ACNC: 69.88 UIU/ML (ref 0.3–5)
VENTRICULAR RATE- MUSE: 76 BPM
WBC # BLD AUTO: 6.8 10E3/UL (ref 4–11)

## 2021-11-10 PROCEDURE — 93005 ELECTROCARDIOGRAM TRACING: CPT

## 2021-11-10 PROCEDURE — 80061 LIPID PANEL: CPT | Performed by: PSYCHIATRY & NEUROLOGY

## 2021-11-10 PROCEDURE — 84443 ASSAY THYROID STIM HORMONE: CPT | Performed by: PSYCHIATRY & NEUROLOGY

## 2021-11-10 PROCEDURE — 93010 ELECTROCARDIOGRAM REPORT: CPT | Performed by: INTERNAL MEDICINE

## 2021-11-10 PROCEDURE — 99222 1ST HOSP IP/OBS MODERATE 55: CPT | Mod: AI | Performed by: PSYCHIATRY & NEUROLOGY

## 2021-11-10 PROCEDURE — 128N000001 HC R&B CD/MH ADULT

## 2021-11-10 PROCEDURE — 250N000013 HC RX MED GY IP 250 OP 250 PS 637: Performed by: PSYCHIATRY & NEUROLOGY

## 2021-11-10 PROCEDURE — 36415 COLL VENOUS BLD VENIPUNCTURE: CPT | Performed by: PSYCHIATRY & NEUROLOGY

## 2021-11-10 PROCEDURE — 80053 COMPREHEN METABOLIC PANEL: CPT | Performed by: PSYCHIATRY & NEUROLOGY

## 2021-11-10 PROCEDURE — 85025 COMPLETE CBC W/AUTO DIFF WBC: CPT | Performed by: PSYCHIATRY & NEUROLOGY

## 2021-11-10 PROCEDURE — 83036 HEMOGLOBIN GLYCOSYLATED A1C: CPT | Performed by: PSYCHIATRY & NEUROLOGY

## 2021-11-10 PROCEDURE — 999N000157 HC STATISTIC RCP TIME EA 10 MIN

## 2021-11-10 PROCEDURE — 93005 ELECTROCARDIOGRAM TRACING: CPT | Performed by: PSYCHIATRY & NEUROLOGY

## 2021-11-10 PROCEDURE — 250N000012 HC RX MED GY IP 250 OP 636 PS 637: Performed by: NURSE PRACTITIONER

## 2021-11-10 RX ORDER — OLANZAPINE 2.5 MG/1
2.5 TABLET, FILM COATED ORAL AT BEDTIME
Status: DISCONTINUED | OUTPATIENT
Start: 2021-11-10 | End: 2021-11-11

## 2021-11-10 RX ORDER — DEXTROSE MONOHYDRATE 25 G/50ML
25-50 INJECTION, SOLUTION INTRAVENOUS
Status: DISCONTINUED | OUTPATIENT
Start: 2021-11-10 | End: 2021-12-02 | Stop reason: HOSPADM

## 2021-11-10 RX ORDER — NICOTINE POLACRILEX 4 MG
15-30 LOZENGE BUCCAL
Status: DISCONTINUED | OUTPATIENT
Start: 2021-11-10 | End: 2021-12-02 | Stop reason: HOSPADM

## 2021-11-10 RX ADMIN — INSULIN ASPART 3 UNITS: 100 INJECTION, SOLUTION INTRAVENOUS; SUBCUTANEOUS at 17:21

## 2021-11-10 RX ADMIN — OLANZAPINE 2.5 MG: 2.5 TABLET, FILM COATED ORAL at 20:08

## 2021-11-10 ASSESSMENT — ACTIVITIES OF DAILY LIVING (ADL)
HYGIENE/GROOMING: HANDWASHING;INDEPENDENT
ORAL_HYGIENE: INDEPENDENT
DRESS: SCRUBS (BEHAVIORAL HEALTH)

## 2021-11-10 NOTE — CONSULTS
[facility]  Consult Note - Hospitalist Service     Date of Admission:  11/9/2021  Consult Requested by: Dr Winter  Reason for Consult: new admit, elevated blood pressure, diabetic, not taking medications since 3/2021    Assessment & Plan   Linnette Monsivais is a 71 year old French speaking female who presented to [facility] emergency department on 11/9/2021 for psychiatric evaluation.  Brought to emergency department by brother-in-law who reports increased erratic and paranoid behaviors.  Recently moved from [place] [phone].  History of paranoid schizophrenia, hypothyroidism, diabetes mellitus and hyperlipidemia.  Admitted to inpatient behavioral health unit at [facility] on 11/9/2021 for further manage of psychiatric illness.  Hospital medicine consult for medical management, new admission.    #Medication noncompliance  -Reported that patient has not taken her medicines since March 2021.  TSH is 69.88 and hemoglobin A1c is 9.3.   -Medication reconciliation not completed.  Spoke to pharmacist who attempted to see patient earlier today and patient not wanting to be seen.  Also did not have a release of information to speak to patient's family about her medications.  -Nurse spoke to the patient today who stated she is on Januvia, insulin, levothyroxine and cholesterol medicine.  I did briefly speak to the patient with Dr. Winter this evening.  Patient says she has bottles of medications in her belongings.  I have asked can charge nurse to look through her belongings and see if there are any medication bottles.  For now I have restarted the levothyroxine at a low dose 12.5 mg daily.  Initiated the diabetic order set with sliding scale low insulin resistant dosing 3 times a day with meals.  Follow-up tomorrow    #Elevated blood pressure without diagnosis of hypertension  -Blood pressure 146/67, 159/70  -Await [place] reconciliation.  No antihypertensives at this time.  We will  follow up tomorrow    #Type 2 diabetes  -Hemoglobin A1c 9.3%.  Patient reports previous A1c was 7  -She reports being on Januvia and possibly Lantus insulin  -Diabetic order set initiated with Accu-Cheks 4 times a day, insulin aspart sliding scale and hypoglycemic protocol    #Hypothyroidism  -Patient reported previously being on levothyroxine.  TSH is high today 69.88  -Since she is likely been off her medicine for several months will restart levothyroxine at 12.5 mcg daily.  Check TSH with free T4 in 4 to 6 weeks    #Hyperlipidemia  -Patient reporting she has been on a statin in the past.  Currently total cholesterol 174 with LDL 80  -Would not restart statin at this time unless she has a history of coronary artery disease or stroke    #Paranoid schizophrenia  -Psychiatry managing      Total time spent on the unit 20 minutes in reviewing the record, examination of patient, lab results and completing documentation. 12 minutes was spent in discussion and counseling with patient concerning diagnosis, medications, lab results and treatment plan. Care discussed and coordinated with patient and nurse.     CHRIST [facility]  Securely message with the Vocera Web Console (learn more here)  Text page via [facility] Paging/Directory                 ______________________________________________________________________    Chief Complaint   Medication noncompliance    History is obtained from the patient and chart review.    History of Present Illness    has left for the day.  Dr. Winter was present and able to provide some Tajik interpretation.  Patient was sitting in the lounge area with sunglasses on.  Patient has no physical complaints.  No reports of fever, shortness of breath, chest pain or nausea.  Patient says she brought her bottles of medicines with her and they are in her belongings.  She reports being diabetic and previously on Januvia and Lantus  insulin.  She also reports being on levothyroxine and Lipitor prior to admission.    Review of Systems   The 10 point Review of Systems is negative other than noted in the HPI    Past Medical History    I have reviewed this patient's medical history and updated it with pertinent information if needed.   No past medical history on file.    Past Surgical History   I have reviewed this patient's surgical history and updated it with pertinent information if needed.  No past surgical history on file.    Social History   I have reviewed this patient's social history and updated it with pertinent information if needed.  Social History     Tobacco Use     Smoking status: Not on file     Smokeless tobacco: Not on file   Substance Use Topics     Alcohol use: Not on file     Drug use: Not on file       Family History   Family history unknown    Medications   I have reviewed this patient's current medications    Allergies   No Known Allergies    Physical Exam   Vital Signs: Temp: 98.5  F (36.9  C) Temp src: Oral BP: (!) 146/67 Pulse: 86   Resp: 16 SpO2: 98 % O2 Device: None (Room air)    Weight: 113 lbs 4.8 oz    General Appearance: Alert, calm, sitting in the chair with sunglasses on, no apparent distress  HEENT: Normocephalic, atraumatic  Respiratory: Clear bilaterally, nonlabored  Cardiovascular: S1, S2, rhythm rate regular, negative murmur, negative lower extremity edema  GI: Bowel sounds positive x4, nondistended and nontender  Skin: No visible rashes or bruising  Musculoskeletal: No joint deformity  Neurologic: Alert, speech intact, facial symmetry, moves all extremities equally, sensation intact    Data   Results for orders placed or performed during the hospital encounter of 11/09/21 (from the past 24 hour(s))   ECG 12-LEAD WITH MUSE (LHE)   Result Value Ref Range    Systolic Blood Pressure  mmHg    Diastolic Blood Pressure  mmHg    Ventricular Rate 76 BPM    Atrial Rate 76 BPM    IL Interval 140 ms    QRS Duration 74  ms     ms    QTc 443 ms    P Axis 40 degrees    R AXIS -32 degrees    T Axis 48 degrees    Interpretation ECG       Sinus rhythm  Left axis deviation  Abnormal ECG  No previous ECGs available  Confirmed by JOSLYN MCFADDEN MD LOC:WW ([id number]) on 11/10/2021 10:28:05 AM     CBC with Platelets & Differential    Narrative    The following orders were created for panel order CBC with Platelets & Differential.  Procedure                               Abnormality         Status                     ---------                               -----------         ------                     CBC with platelets and d...[[id number]]                      Final result                 Please view results for these tests on the individual orders.   Comprehensive metabolic panel   Result Value Ref Range    Sodium 141 136 - 145 mmol/L    Potassium 3.9 3.5 - 5.0 mmol/L    Chloride 105 98 - 107 mmol/L    Carbon Dioxide (CO2) 30 22 - 31 mmol/L    Anion Gap 6 5 - 18 mmol/L    Urea Nitrogen 25 8 - 28 mg/dL    Creatinine 0.74 0.60 - 1.10 mg/dL    Calcium 9.3 8.5 - 10.5 mg/dL    Glucose 115 70 - 125 mg/dL    Alkaline Phosphatase 108 45 - 120 U/L    AST 18 0 - 40 U/L    ALT 20 0 - 45 U/L    Protein Total 6.5 6.0 - 8.0 g/dL    Albumin 3.1 (L) 3.5 - 5.0 g/dL    Bilirubin Total 0.8 0.0 - 1.0 mg/dL    GFR Estimate 82 >60 mL/min/1.73m2   TSH   Result Value Ref Range    TSH 69.88 (H) 0.30 - 5.00 uIU/mL   Lipid panel reflex to direct LDL   Result Value Ref Range    Cholesterol 174 <=199 mg/dL    Triglycerides 82 <=149 mg/dL    Direct Measure HDL 78 >=50 mg/dL    LDL Cholesterol Calculated 80 <=129 mg/dL   Hemoglobin A1c   Result Value Ref Range    Hemoglobin A1C 9.3 (H) <=5.6 %   CBC with platelets and differential   Result Value Ref Range    WBC Count 6.8 4.0 - 11.0 10e3/uL    RBC Count 4.33 3.80 - 5.20 10e6/uL    Hemoglobin 13.1 11.7 - 15.7 g/dL    Hematocrit 38.9 35.0 - 47.0 %    MCV 90 78 - 100 fL    MCH 30.3 26.5 - 33.0 pg    MCHC 33.7 31.5 -  36.5 g/dL    RDW 13.6 10.0 - 15.0 %    Platelet Count 226 150 - 450 10e3/uL    % Neutrophils 52 %    % Lymphocytes 38 %    % Monocytes 6 %    % Eosinophils 3 %    % Basophils 1 %    % Immature Granulocytes 0 %    NRBCs per 100 WBC 0 <1 /100    Absolute Neutrophils 3.5 1.6 - 8.3 10e3/uL    Absolute Lymphocytes 2.6 0.8 - 5.3 10e3/uL    Absolute Monocytes 0.4 0.0 - 1.3 10e3/uL    Absolute Eosinophils 0.2 0.0 - 0.7 10e3/uL    Absolute Basophils 0.1 0.0 - 0.2 10e3/uL    Absolute Immature Granulocytes 0.0 <=0.0 10e3/uL    Absolute NRBCs 0.0 10e3/uL

## 2021-11-10 NOTE — H&P
"PSYCHIATRY   HISTORY AND PHYSICAL     DATE OF SERVICE   11/10/2021       CHIEF COMPLAINT   \" I do not want to talk right now.\"       HISTORY OF PRESENT ILLNESS   This is a 71 year old female with history of Paranoid schizophrenia, chronic condition with acute exacerbation (H).  Current legal status is 72-hour hold that has been changed to a voluntary status as patient is in agreement with staying in the hospital.  Patient is a 71-year-old woman from [place], she is currently domiciled with her sister and brother-in-law, unemployed and supported by her family; that was admitted to the psychiatric inpatient unit due to increased paranoia, delusion and psychosis in the context of medication noncompliance.    Today patient was seen and evaluated in the consult room by herself, this was a face-to-face evaluation.  Interview was conducted in Italian by this writer.  During the assessment the patient insisted in speaking English despite her not making sense, having problems understanding English and not being able to fully communicate.  Patient initially said that she was not going to talk to me as she was worried her family was murdered.  Patient was also demanding to speak with \"an American doctor\".  Patient insisted in this writer contacting her family which I did, but they did not responded and I was only able to leave a message.  Patient also said that she was not going to eat as she is concerned someone is poisoning her food.  Patient refused to engage in any conversation because she was not able to communicate with her family.  At that time patient stated if by 1 PM she is not able to communicate with her brother-in-law or sister she was going to demand to leave the hospital.    Patient was able to communicate with her family later on during the day.  I met again with the patient late in the afternoon and patient only stated that sometimes she feels very paranoid and believes things that are not true.  Again patient " refused to give me details about her symptoms.  After reviewing with the patient her treatment plan patient informed to me that she is in agreement with starting Zyprexa 2.5 mg at bedtime and increasing this medication to 5 mg at bedtime.  Patient said that in the past she has been admitted to [facility] and [facility] (both hospitals are located in [facility]).  Currently patient denies having any thoughts about harming herself or harming others.    I explained to the patient that at this time I was going to change her legal status to voluntary as she is in agreement with taking her medications, staying in the hospital and work on a safe discharge plan.  I also informed the patient that we have asks for an  to help her while she is here in the hospital.  At the end the patient verbalized good understanding and she was in agreement with the plan.    Labs ordered and they are abnormal.  Hemoglobin A1c is 9.3 and TSH is 69.88.  I have sent a message to the medical nurse practitioner.    As per ER notes:  11/9/2021  Linnette Monsivais 1950      [facility] Crisis Assessment:    Started at: 12:25pm  Completed at: 1:25pm  Patient was assessed via virtually ([employer] cart or other teleconferencing device).  Patient Location: [facility]     Chief Complaint and History of Presenting Problem:     Patient is a 71 year old  and / female who presented to the ED by Family/Friends related to concerns for worsening of paranoia, anxiety, difficulty sleeping and daily functioning.  Pt has a history of paranoid schizophrenia and medication non-adherence.  Pt was  and lived in [place] alone by herself.  Pt's sister and brother in-law concerned about Pt's well being as they brought her to their home in [place], [place] about a week ago to get help for her.  Pt reported she stopped taking her Zyprexa back in March of this year as the medication made her difficulty to move her  arms and legs.  Pt denied having depression symptoms, but endorsed increased anxiety symptoms.  Pt reported difficulty sleeping and loss of appetite.  Pt endorsed increased paranoia as she felt someone was watching her, following her and going to kill her.  Pt noted being in a constant state of fear, as she cannnot sleep at night and pacing in her sister's home.  Pt also pacing with a  knife in her hand at her sister's home to protect herself from intruders.  Pt denied having auditory hallucination but endorsed in visual hallucination of seeing someone going to harm her and shadows on TV.  Pt denied having suicidal and homicidal ideations.  However, Pt noted she will protect herself by harming someone if someone was going to harm her.     Assessment and intervention involved meeting with pt, [facility] and Yuri Mercado MD, employing crisis psychotherapy including: Establishing rapport, Active listening, Assess dimensions of crisis, Apply solution-focused therapy to address current crisis, Identify additional supports and alternative coping skills, Motivational Interviewing, Brief Supportive Therapy and Trauma-Informed Care. Collateral information includes Pt's brother in-law, Sarbjit Arriaza [phone] who was present in the ER. Sarbjit reported Pt has not been sleeping at night due to increased paranoia and fear of someone going to harm her.  Sarbjit reported Pt has been pacing and walking back and forth a lot in his home.  Sarbjit reported Pt was walking with a  knife in her hand to protect herself and family from intruders.  Sarbjit preferred inpatient service for Pt for further stabilization, safety assessment and medication management.     Biopsychosocial Background and Demographic Information     Pt reported her both parents have passed away and had 4 siblings.  Pt noted two of her siblings also have passed away.  Pt reported she was  and has no  children.  Pt reported she was living in [place] and alone as she was retired.  Pt shared she did not have much of daily routine and structure other than listening to music.     Mental Health History and Current Symptoms      Pt reported she was diagnosed with paranoid schizophrenia in 1998 in [place].  Pt denied having depression symptoms, but endorsed increased anxiety symptoms.  Pt reported difficulty sleeping and loss of appetite.  Pt endorsed increased paranoia as she felt someone was watching her, following her and going to kill her.  Pt noted being in a constant state of fear, as she cannnot sleep at night and pacing in her sister's home.  Pt also pacing with a  knife in her hand at her sister's home to protect herself from intruders.  Pt denied having auditory hallucination but endorsed in visual hallucination of seeing someone going to harm her and shadows on TV.  Pt denied having suicidal and homicidal ideations.  However, Pt noted she will protect herself by harming someone if someone was going to harm her.        Patient identifies historical diagnoses of paranoid schizophrenia. At baseline, patient describes their mental health symptoms as struggling.      Mental Health History (prior psychiatric hospitalizations, civil commitments, programmatic care, etc):Pt reported a history of psychiatric hospitalization in 1998 in [place] for 40 days.  Family Mental and Chemical Health History: None reported.     CHEMICAL DEPENDENCY HISTORY   History   Drug Use Not on file       Social History    Substance and Sexual Activity      Alcohol use: Not on file      History   Smoking Status     Not on file   Smokeless Tobacco     Not on file       Treatment: Unable to assess  Detox: Unable to assess  Legal: Unable to assess       PAST PSYCHIATRIC HISTORY   Psychiatrist: Patient does not have any psychiatrist here in [place]  Therapist: Unable to assess  Case Management: None  Hospitalizations: Unable to fully  assess.    History of Commitment: Unable to assess  Past Medications: Patient only mentioned taking Zyprexa in the past.  ECT:  No  Suicide Attempts/Gun Access: Denies both  Community Supports: Family       PAST MEDICAL HISTORY   No past medical history on file.    No past surgical history on file.    Primary Care Provider: No Ref-Primary, Physician  Medications:     OLANZapine  2.5 mg Oral At Bedtime     Medications as needed: acetaminophen, alum & mag hydroxide-simethicone, gabapentin, nicotine, OLANZapine **OR** OLANZapine, polyethylene glycol, traZODone    ALLERGIES: Patient has no known allergies.       MEDICATIONS   No current outpatient medications on file.       Medication adherence issues: MS Med Adherence Y/N: Yes, Hospitalization  Medication side effects: MEDICATION SIDE EFFECTS: no side effects reported  Benefit: Yes / No: Yes       ROS   A comprehensive review of systems was negative.       FAMILY HISTORY   No family history on file.     Psychiatric: Unable to assess  Chemical: Unable to assess  Suicide: Unable to assess       SOCIAL HISTORY   Social History     Socioeconomic History     Marital status:      Spouse name: Not on file     Number of children: Not on file     Years of education: Not on file     Highest education level: Not on file   Occupational History     Not on file   Tobacco Use     Smoking status: Not on file     Smokeless tobacco: Not on file   Substance and Sexual Activity     Alcohol use: Not on file     Drug use: Not on file     Sexual activity: Not on file   Other Topics Concern     Not on file   Social History Narrative     Not on file     Social Determinants of Health     Financial Resource Strain: Not on file   Food Insecurity: Not on file   Transportation Needs: Not on file   Physical Activity: Not on file   Stress: Not on file   Social Connections: Not on file   Intimate Partner Violence: Not on file   Housing Stability: Not on file       Born and Raised:  [place]  Occupation: Unemployed  Marital Status: Unable to assess  Children: Unable to assess  Legal: Voluntary  Living Situation: Living arrangements - the patient lives with their family  ASSETS/STRENGTHS: Verbal       MENTAL STATUS EXAM   Appearance:  No apparent distress  Mood:  {Mood: Anxious  and Irritable   Affect: restricted  was congruent to speech  Suicidal Ideation: PRESENT / ABSENT: absent   Homicidal Ideation: PRESENT / ABSENT: absent   Thought process: disorganized   Thought content: significant for preoccupations and paranoid ideation.   Fund of Knowledge: Below average  Attention/Concentration: Easily distracted  Language ability:  Intact  Memory:  Immediate recall impaired, Short-term memory impaired and Long-term memory intact  Insight:  limited.  Judgement: limited  Orientation: Yes, x4  Psychomotor Behavior: restless    Muscle Strength and Tone: MuscleStrength: Normal  Gait and Station: Normal       PHYSICAL EXAM   Vitals: BP (!) 146/67 (BP Location: Right arm)   Pulse 86   Temp 98.5  F (36.9  C) (Oral)   Resp 16   Wt 51.4 kg (113 lb 4.8 oz)   SpO2 98%     Physical exam: Patient refused the physical exam by this writer.    Medical consult has been placed and patient has been discussed with the medical nurse practitioner.       LABS   personally reviewed.   No results found for any previous visit.     No results found for: PHENYTOIN, PHENOBARB, VALPROATE, CBMZ  Labs have been ordered.     ASSESSMENT   This is a 71 year old female with history of Paranoid schizophrenia, chronic condition with acute exacerbation (H).  Current legal status is 72-hour hold that has been changed to a voluntary status as patient is in agreement with staying in the hospital.  Patient is a 71-year-old woman from [place], she is currently domiciled with her sister and brother-in-law, unemployed and supported by her family; that was admitted to the psychiatric inpatient unit due to increased paranoia, delusion and psychosis  in the context of medication noncompliance.  Currently the patient is in agreement with staying in the hospital voluntarily and with taking medications.         DIAGNOSIS   Principal Problem:    Paranoid schizophrenia, chronic condition with acute exacerbation (H)    Active Problem List:  Patient Active Problem List   Diagnosis     Psychosis (H)     Paranoid schizophrenia, chronic condition with acute exacerbation (H)          PLAN   1. Education given regarding diagnostic and treatment options with risks, benefits and alternatives and adequate verbalization of understanding.  2. Admitted.    2000.    Precautions placed .  3. Medications: 11/10/2021: PTA medications reviewed.    Zyprexa  4. Medications:  Hospital    Start Zyprexa 2.5 mg at bedtime, patient has been educated about the use of this medication including benefits, risk and side effects.  At this time the patient is in agreement with restarting the medication.  5. Consultations:    Hospitalist to follow as needed.  6. Structure and Supervision    Unit 2800.    Barriers to Discharge: Symptom stabilization  7.   is following in regards to collecting and reviewing collateral information, referrals and disposition planning.    Legal: Voluntary    Referrals: TBD    Care Coordination:     Placement: Return home with family once stable    Anticipated Discharge: Within a week  . Further treatment programming to be determined throughout the hospital course.        Risk Assessment: [facility] RISK ASSESSMENT: Patient able to contract for safety    This note was created with help of Dragon dictation system. Grammatical / typing errors are not intentional.    Cyndi Hill MD       CERTIFICATION   Initial Certification I certify that the inpatient psychiatric facility admission was medically necessary for treatment which could   reasonably be expected to improve the patient s condition.                                       I  estimate 7 days of hospitalization is necessary for proper treatment of the patient. My plans for post-hospital care for this patient are home with family.                                       Cyndi Hill MD     -     11/10/2021     -10:40 AM

## 2021-11-10 NOTE — ED NOTES
Pt currently lying in hospital bed. Will continue to monitor. [facility] is called with an update on pt. Terry's acknowledges.

## 2021-11-10 NOTE — ED NOTES
Updated patient and brother on transfer to [facility]. Pt calm, cooperative, up to the bathroom, brushed her teeth, ate dinner, resting in bed.

## 2021-11-10 NOTE — PROGRESS NOTES
11/10/21 1529   Engagement   Intervention Group   Topic Detail Creating lotion jars and education of essential oils for coping skills and wellbeing   Attendance Attended   Patient Response Demonstrated understanding of materials provided;Was respectful;Contributes to conversation  (Pt answered check-in question sharing she has used paula before.)   Concentrated on Task 30 - 45 min   Cognition Goal-directed   Mood/Affect Pleasant   Social/Behavioral Engaged;Cooperative  (She chose Mandrian as an essential oil and was receptive to reciving help with peeling off stickers (possibly due to fine motor difficulties).)   Thought Content [place]   Supervision   Supervision On-site supervision provided

## 2021-11-10 NOTE — ED NOTES
Pt fighting with staff and resisting getting onto transport stretcher. EMS crew that was here is unable to put someone into 4 point restraints and will call for another crew. Pt trying to leave ED. Provider gave order for zyprexa. Security assisting with patient behaviors.

## 2021-11-10 NOTE — ED NOTES
Transport ambulance here to transport pt to [facility]. Pt cooperatively moves from hospital bed to ambulance cot. Pt is calm. Report given to paramedic.

## 2021-11-10 NOTE — PROGRESS NOTES
11/10/21 1116   Engagement   Intervention Group   Topic Detail accessible chair yoga and breathwork for physical and mental wellbeing, grounding, relaxation   Attendance Did not attend   Reason for Not Attending Refused  (Pt stated she was tired.)

## 2021-11-10 NOTE — PLAN OF CARE
"  Problem: Behavioral Health Plan of Care  Goal: Absence of New-Onset Illness or Injury  Outcome: No Change  Intervention: Identify and Manage Fall Risk  Recent Flowsheet Documentation  Taken 11/10/2021 1100 by Shruti Vazquez RN  Safety Measures:    environmental rounds completed    safety rounds completed  Goal: Develops/Participates in Therapeutic [place] to Support Successful Transition  Outcome: No Change  Intervention: Foster Therapeutic [place]  Recent Flowsheet Documentation  Taken 11/10/2021 1100 by Shruti Vazquez RN  Trust Relationship/Rapport:    care explained    questions answered    reassurance provided    thoughts/feelings acknowledged     Problem: Sleep Disturbance  Goal: Adequate Sleep/Rest  Outcome: No Change     Problem: Behavioral Health Plan of Care  Goal: Plan of Care Review  Outcome: Improving  Flowsheets (Taken 11/10/2021 1100)  Plan of Care Reviewed With: patient  Patient Agreement with Plan of Care: (\"why am I here?\") agrees with comment (describe)  Goal: Patient-Specific Goal (Individualization)  Outcome: Improving  Note:     Goal: Adheres to Safety Considerations for Self and Others  Outcome: Improving  Intervention: Develop and Maintain Individualized Safety Plan  Recent Flowsheet Documentation  Taken 11/10/2021 1100 by Shruti Vazquez RN  Safety Measures:    environmental rounds completed    safety rounds completed  Goal: Optimized Coping Skills in Response to Life Stressors  Outcome: Improving     Problem: Behavior Regulation Impairment (Psychotic Signs/Symptoms)  Goal: Improved Behavioral Control (Psychotic Signs/Symptoms)  Outcome: Improving     Problem: Cognitive Impairment (Psychotic Signs/Symptoms)  Goal: Optimal Cognitive Function (Psychotic Signs/Symptoms)  Outcome: Improving  Intervention: Support and Promote Cognitive Ability  Recent Flowsheet Documentation  Taken 11/10/2021 1100 by Shruti Vazquez RN  Trust Relationship/Rapport:    care explained    questions " answered    reassurance provided    thoughts/feelings acknowledged     Problem: Decreased Participation and Engagement (Psychotic Signs/Symptoms)  Goal: Increased Participation and Engagement (Psychotic Signs/Symptoms)  Outcome: Improving     Problem: Mood Impairment (Psychotic Signs/Symptoms)  Goal: Improved Mood Symptoms (Psychotic Signs/Symptoms)  Outcome: Improving     Problem: Psychomotor Impairment (Psychotic Signs/Symptoms)  Goal: Improved Psychomotor Symptoms (Psychotic Signs/Symptoms)  Outcome: Improving     Problem: Sensory Perception Impairment (Psychotic Signs/Symptoms)  Goal: Decreased Sensory Symptoms (Psychotic Signs/Symptoms)  Outcome: Improving     Problem: Suicidal Behavior  Goal: Suicidal Behavior is Absent or Managed  Outcome: Improving     Problem: Social, Occupational or Functional Impairment (Psychotic Signs/Symptoms)  Goal: Enhanced Social, Occupational or Functional Skills (Psychotic Signs/Symptoms)  Intervention: Promote Social, Occupational and Functional Ability  Recent Flowsheet Documentation  Taken 11/10/2021 1100 by Shruti Vazquez RN  Trust Relationship/Rapport:    care explained    questions answered    reassurance provided    thoughts/feelings acknowledged   Pt pleasant on approach. Calm and cooperative. Visible in milieu for meals but minimally engages with peers. Pt is Gambian speaking but understands minimal english. Order in place for an . Denies SI, HI, SIB, hallucinations and other psychs symptoms. Denies pain, anxiety and depression. Contracts for safety.     Pt has new orders for Labs: ECG 12-lead, Hemoglobin A1C, Lipid, CBC, CMP, TSH, UA.    New order Zyprexa 2.5mg at HS, Pt can have sun glasses. Hospitalist consult for elevated BP

## 2021-11-10 NOTE — PLAN OF CARE
"      Initial Psychosocial Assessment    Type of CM visit: Initial Assessment, Clinical Treatment Coordinator Role Introduction, Offer Discharge Planning    Information obtained from: [x]Patient   []Chart review  [x]Collateral Contacts  []Court Website    Hospitalization information:   Linnette Monsivais is a 71 year old who was admitted to unit 2800 on 11/9/2021 due to psychosis.     Patient Self-Assessment  Patient reported reason for admission: \"people are trying to kill me\".      Patient reported symptoms of concern: []sadness    [x]anxiety     []anger    []poor sleep     []medications not working    []racing thoughts     []substance use     [x]agitation     [x]hearing voices     []hopelessness   []Eating concerns    []Self-injury      [] Other   Comments:    Current suicidal ideation:  [x]No    []Yes, no plan     []Yes, with plan (describe):          Comments:   Current homicidal ideation:  [x]No   [] Yes       Comments:     Legal Status at Admission: 72 Hour Hold  72 Hour Hold - Date/Time Initiated: 11/09/2021  at 1831  72 Hour Hold - Date/Time Ends: 11/12/2021 at 1831      History of Mental Health:  Describe current and past mental health symptoms present? Pt states that she was born and raised in [place], [place]. Pt states that she has a diagnosis of paranoid schizophrenia and was hospitalized and diagnosed in 1998. Pt denies other psychiatric hospitalizations and denies a hx of civil commitment. Pt did not have any social supports in [place] and recently moved to [place] to live with her brother in law and sister. Pt reports that she stopped taking her Zyprexa in March of 2021 due to increased Parkinson like symptoms. Per report, pt has been acting erratic and bizarre. Pt is paranoid and believes that someone is out to kill her and her family. Pt denies SI/HI/SIB. Pt does not have mental health supports in [place].     Do you understand your mental health diagnosis? YES [x]   NO []    History of psychiatric " hospitalizations?  YES [x]     NO  []  Details: 1 previous hospitalization in 1998 in [place], [place]     If YES, within the last 30 days? YES []     NO  [x]    History of commitment?  YES []     NO  [x]    Details:   History of ECT?  YES []     NO  [x]    Details:     History of Substance Use Disorder:  Have you used alcohol or substances in the past 12 months? YES []/ NO [x]              If Yes, N/A     Would you like a substance use disorder evaluation? YES [] / NO [x]    Previous Treatment? YES []/ NO [x]  Details:     Significant Life Events  (Illness, Death, Loss): Pt denies       Is there a history of abuse or psychological trauma:    []Denies       []Yes, present (type):         []Yes, past (type):        [x]Patient declined to answer    Identify current stressors:    []financial,    []legal issues,    []homelessness,    []housing,     []recent loss,    []relationships,    []substance use concerns,    []medical     []unemployment     []employment  concerns    []isolation,    []lack of resources,     []out of home placements,     []parenting issues     []domestic violence     []other:  Comments: Pt denies       Living Situation:     [x]House/apt    []Group Home    []IRTS     []Homeless     []Assisted Living     []Nursing home    []Lives alone    [x]Lives with :  Sister and brother-in-law                       []Other:          Family Composition: Pt is . Pt denies having children. Pt currently resides with sister Rosie and brother in law Luis.     Children, ages and current location if minor: n/a      Relationship status  []Single     []     []     []       []Significant Other   [x]Other:       Educational Background:  []Less Than High School     [x]High School     []GED     []College       Cognitive/learning concerns: Pt denies     Financial Status: [] Employed, status and location:  []Unemployment    []County Assistance     [x]SSI/SSDI      []Waivered services     []Other:    Legal status(present):   []Voluntary, [x]72-hour hold, []Commitment, []Guardianship, []Revocation, []Stay of commitment,    Details: expires 11/12 @ 1831    Other legal issues identified:  [x]None, []Arrest,  []Probation/[place],  []Driving under influence,  []Incarceration,  []Sexual offense (level):   []Child Protective Services,      []Other:      Details:    Ethnic/Cultural considerations:  Pt's first language is Bhutanese     Spiritual considerations: Pt endorses that she is jerzy.      Service History:  [x]No     []Yes: details:    Social Functioning (organization, interests) and strengths: Pt states that she enjoys gardening and cooking, however pt states that recently she has not had interest to participate or engage in these activities.     Current Treatment Providers Are:     NO Name, Agency, and phone   Psychiatrist  [x]    Psychotherapist  [x]    [facility] worker  [x]      [x]    Waivered Services  [x]    ACT Teams  [x]    Day Treatment/PHP/[facility] trtmt  [x]    Group Home/AFC/AL  [x]      [x]    Other:  []            Social Service Assessment of identified patient needs and plan to meet those needs: Linnette is a 71 year old female admitted to PCU [facility]00 at [facility] from [facility] due to erratic and bizarre behaviors. Writer offered pt a  to assist in initial assessment and pt declined. Per report, pt was living in [place], [place] and her mental health was decompensating. Pt has a diagnosis of paranoid schizophrenia and was diagnosed in 1998. Pt states that she discontinued her psychiatric medication in March of 2021 due to increased Parkinson like symptoms. Per report, pt has been acting erratic and bizarre. Pt is paranoid and believes that someone is out to kill her and her family. Pt denies SI/HI/SIB. Pt does not have mental health supports in [place]. Pt recently came to [place] 1 week ago due to lack of  social support. Pt is currently living with her sister Rosie and brother in law Luis. Pt denies SI/HI/SIB. Pt denies hx of civil commitment and only endorses 1 previous psychiatric admission in 1998. Pt did sign DANNIE for brother in law Luis. Pt is currently on a 72 hour hold that expires on 11/15/21 @ 1831.     Writer called and spoke to Luis #[phone] who confirms that pt's behavior has been erratic. Luis reports that pt has been extremely paranoid, carrying a knife around the home for protection and excessively checking the locks. Luis also confirms that pt discontinued her psychiatric medication last March 2021. Luis states that pt is stable when she takes her medications. Luis would also like assistance connecting pt to mental health resource and support in the community. Social work to continue to collaborate with interdisciplinary team and make referrals as indicated.       Possible discharge plan:  Restart psychiatric medications and discharge home with outpatient mental health supports.       Barriers: Medication Management, Symptom Stabilization, Coordination of Care

## 2021-11-10 NOTE — PROGRESS NOTES
"Writer educated pt on visitor policy, pt rights and unit schedule.  Pt reviewed and signed belongings sheet.  Pt signed valuable form and envelope was sent to hospital safe.  Full code pt ID band applied and paperwork signed and filed in chart.  Per Dr. Moy balderrama for pt to wear sunglasses on unit.  Pt is pleasant and cooperative.  Pt informed writer she was hospitalized X 40 days approximately 20 years ago.  Pt reports she was started on Zyprexa at that time.  Pt states she discontinued the Zyprexa in March per her PMD recommendation d/t Parkinson's type sx.  Pt was to follow up with MD who prescribed Zyprexa however that was not done.  Pt lived in [place] until Mon 11/8/21 when she came to [place].  Pt has been feeling \"uneasy\" and been paranoid since mid October.  Pts brother and sister live in [place] and they asked pt to come here to be evaluated d/t increased paranoia.  Pt reports taking Insulin, Januvia, Levothyroxine, and Lipitor prior to hospitalization.          "

## 2021-11-10 NOTE — PROGRESS NOTES
Pt with history of paranoid schizophrenia was brought to the ED by  brother-in-law for psychiatric evaluation.Per brother-in - law report,  she had not been compliant with her medication and has been having increased erratic and paranoid behaviors. Patient lives in [place], family arranged to have her come to [place] to assist with her mental health condition. She has been exhibiting bizarre and paranoid behavior, such as walking around the house with a  knife and leaving the  lights on for concerns about intruders and too afraid to go to sleep. Presently on 72 hours hold started 11/09/21 at 1831

## 2021-11-10 NOTE — PLAN OF CARE
Problem: Behavioral Health Plan of Care  Goal: Adheres to Safety Considerations for Self and Others  Outcome: Improving  Intervention: Develop and Maintain Individualized Safety Plan  Recent Flowsheet Documentation  Taken 11/10/2021 0032 by Taylor Muniz, RN  Safety Measures:    environmental rounds completed    safety rounds completed     Problem: Sleep Disturbance (Psychotic Signs/Symptoms)  Goal: Improved Sleep (Psychotic Signs/Symptoms)  Outcome: Improving      Patient was observed sleeping through the night, without any acute distress. Safety checks completed per unit policy. No suicide behaviors noted. She had greater than 6 hrs of sleep.

## 2021-11-11 LAB
ALBUMIN UR-MCNC: NEGATIVE MG/DL
APPEARANCE UR: CLEAR
BILIRUB UR QL STRIP: NEGATIVE
COLOR UR AUTO: YELLOW
GLUCOSE BLDC GLUCOMTR-MCNC: 156 MG/DL (ref 70–99)
GLUCOSE BLDC GLUCOMTR-MCNC: 188 MG/DL (ref 70–99)
GLUCOSE BLDC GLUCOMTR-MCNC: 210 MG/DL (ref 70–99)
GLUCOSE BLDC GLUCOMTR-MCNC: 283 MG/DL (ref 70–99)
GLUCOSE UR STRIP-MCNC: 50 MG/DL
HCG UR QL: NEGATIVE
HGB UR QL STRIP: NEGATIVE
KETONES UR STRIP-MCNC: NEGATIVE MG/DL
LEUKOCYTE ESTERASE UR QL STRIP: NEGATIVE
NITRATE UR QL: NEGATIVE
PH UR STRIP: 5.5 [PH] (ref 5–7)
SP GR UR STRIP: 1.03 (ref 1–1.03)
UROBILINOGEN UR STRIP-MCNC: <2 MG/DL

## 2021-11-11 PROCEDURE — 81025 URINE PREGNANCY TEST: CPT | Performed by: PSYCHIATRY & NEUROLOGY

## 2021-11-11 PROCEDURE — 99233 SBSQ HOSP IP/OBS HIGH 50: CPT | Mod: 95 | Performed by: PSYCHIATRY & NEUROLOGY

## 2021-11-11 PROCEDURE — 250N000013 HC RX MED GY IP 250 OP 250 PS 637: Performed by: PSYCHIATRY & NEUROLOGY

## 2021-11-11 PROCEDURE — 81003 URINALYSIS AUTO W/O SCOPE: CPT | Performed by: PSYCHIATRY & NEUROLOGY

## 2021-11-11 PROCEDURE — 128N000001 HC R&B CD/MH ADULT

## 2021-11-11 PROCEDURE — 250N000013 HC RX MED GY IP 250 OP 250 PS 637: Performed by: NURSE PRACTITIONER

## 2021-11-11 RX ORDER — LEVOTHYROXINE SODIUM 100 UG/1
100 TABLET ORAL DAILY
Status: ON HOLD | COMMUNITY
End: 2021-11-21

## 2021-11-11 RX ORDER — ASPIRIN 81 MG/1
81 TABLET ORAL DAILY
Status: ON HOLD | COMMUNITY
End: 2021-11-21

## 2021-11-11 RX ORDER — AMLODIPINE BESYLATE 5 MG/1
5 TABLET ORAL DAILY
Status: DISCONTINUED | OUTPATIENT
Start: 2021-11-11 | End: 2021-12-02 | Stop reason: HOSPADM

## 2021-11-11 RX ORDER — ROSUVASTATIN CALCIUM 5 MG/1
5 TABLET, COATED ORAL DAILY
Status: ON HOLD | COMMUNITY
End: 2021-11-21

## 2021-11-11 RX ORDER — LEVOTHYROXINE SODIUM 25 UG/1
25 TABLET ORAL
Status: DISCONTINUED | OUTPATIENT
Start: 2021-11-12 | End: 2021-12-02 | Stop reason: HOSPADM

## 2021-11-11 RX ORDER — OLANZAPINE 5 MG/1
5 TABLET ORAL AT BEDTIME
Status: DISCONTINUED | OUTPATIENT
Start: 2021-11-11 | End: 2021-11-14

## 2021-11-11 RX ORDER — CARBIDOPA AND LEVODOPA 25; 100 MG/1; MG/1
1 TABLET, EXTENDED RELEASE ORAL 2 TIMES DAILY
Status: ON HOLD | COMMUNITY
End: 2021-11-12

## 2021-11-11 RX ORDER — ERTUGLIFLOZIN 5 MG/1
1 TABLET, FILM COATED ORAL DAILY
Status: ON HOLD | COMMUNITY
End: 2021-11-21

## 2021-11-11 RX ORDER — AMLODIPINE BESYLATE 5 MG/1
5 TABLET ORAL DAILY
Status: ON HOLD | COMMUNITY
End: 2021-11-21

## 2021-11-11 RX ORDER — ROSUVASTATIN CALCIUM 5 MG/1
5 TABLET, COATED ORAL DAILY
Status: DISCONTINUED | OUTPATIENT
Start: 2021-11-11 | End: 2021-12-02 | Stop reason: HOSPADM

## 2021-11-11 RX ORDER — ASPIRIN 81 MG/1
81 TABLET ORAL DAILY
Status: DISCONTINUED | OUTPATIENT
Start: 2021-11-11 | End: 2021-12-02 | Stop reason: HOSPADM

## 2021-11-11 RX ADMIN — AMLODIPINE BESYLATE 5 MG: 5 TABLET ORAL at 14:40

## 2021-11-11 RX ADMIN — INSULIN ASPART 1 UNITS: 100 INJECTION, SOLUTION INTRAVENOUS; SUBCUTANEOUS at 17:02

## 2021-11-11 RX ADMIN — LEVOTHYROXINE SODIUM 12.5 MCG: 25 TABLET ORAL at 07:07

## 2021-11-11 RX ADMIN — INSULIN ASPART 1 UNITS: 100 INJECTION, SOLUTION INTRAVENOUS; SUBCUTANEOUS at 11:50

## 2021-11-11 RX ADMIN — ASPIRIN 81 MG: 81 TABLET, COATED ORAL at 14:40

## 2021-11-11 RX ADMIN — INSULIN ASPART 1 UNITS: 100 INJECTION, SOLUTION INTRAVENOUS; SUBCUTANEOUS at 07:08

## 2021-11-11 RX ADMIN — OLANZAPINE 5 MG: 5 TABLET, FILM COATED ORAL at 20:08

## 2021-11-11 RX ADMIN — SITAGLIPTIN 50 MG: 25 TABLET, FILM COATED ORAL at 08:47

## 2021-11-11 ASSESSMENT — ACTIVITIES OF DAILY LIVING (ADL)
HYGIENE/GROOMING: HANDWASHING;INDEPENDENT
ORAL_HYGIENE: INDEPENDENT
DRESS: SCRUBS (BEHAVIORAL HEALTH)
HYGIENE/GROOMING: INDEPENDENT
ORAL_HYGIENE: INDEPENDENT
DRESS: SCRUBS (BEHAVIORAL HEALTH)

## 2021-11-11 NOTE — PHARMACY-ADMISSION MEDICATION HISTORY
Pharmacy Note - Admission Medication History    Pertinent Provider Information: Patient has not been taking her medications for quite some time it appears. Obtained information on home medications from family member Sarbjit and called [facility] in [place] to check/verify prescriptions/doses. Pharmacy phone number: [phone].     ______________________________________________________________________    Prior To Admission (PTA) med list completed and updated in EMR as follows: PLEASE NOTE THAT PATIENT HAS NOT BEEN TAKING THESE MEDS FOR QUITE SOME TIME.  1) Rosuvastatin 5 mg daily  2) Amlodipine 5 mg daily  3) Januvia 100 mg daily  4) Levothyroxine 100 mcg daily  5) Carbidopa-levodopa ER  mg 1 tablet twice a day  6) Lantus 28 units subcutaneous daily  7) Steglatro 5 mg daily  8) Trulicity 0.75 mg subcutaneously once a week  9) Aspirin 81 mg EC once daily  10) Calcium with Vitamin D 1 tablet twice a day        Information source(s): Family member Sarbjit and [facility] in [place], Phone: [phone]    Method of interview communication: phone    Summary of Changes to PTA Med List: PTA med list completed to best of our ability using information from family member and pharmacy    In the past week, patient estimated taking medication this percent of the time: patient has not taken medications for quite some time it appears    Patient does not anticipate needing any multi-use medications during admission.    The information provided in this note is only as accurate as the sources available at the time of the update(s).    Thank you for the opportunity to participate in the care of this patient.    Claudia Sumner, [facility]  11/11/2021 9:45 AM

## 2021-11-11 NOTE — PROGRESS NOTES
[facility]    Medicine Progress Note - Hospitalist Service       Date of Admission:  11/9/2021    Brief Summary: Linnette Monsivais is a 71 year old Irish speaking female who presented to [facility] emergency department on 11/9/2021 for psychiatric evaluation.  Brought to emergency department by brother-in-law who reports increased erratic and paranoid behaviors.  Recently moved from [place] [id number].  History of paranoid schizophrenia, hypothyroidism, diabetes mellitus and hyperlipidemia.  Admitted to inpatient behavioral health unit at [facility] on 11/9/2021 for further manage of psychiatric illness.  Hospital medicine consult for medical management, new admission.    Assessment & Plan         #Medication noncompliance  -Reported that patient has not taken her medicines since March 2021.  TSH is 69.88 and hemoglobin A1c is 9.3.   -Medication reconciliation completed.   Given that she had stopped her medications for several months I am starting patient on lower dose of levothyroxine. And holding patient's Lantus and ertugliflozin     #Elevated blood pressure without diagnosis of hypertension  -Blood pressure 146/67, 159/70, 133/70  -Restarted amlodipine 5 mg daily     #Type 2 diabetes  -Hemoglobin A1c 9.3%.  Patient reports previous A1c was 7  -Prior to admission medications Januvia 100 mg daily, Trulucity weekly injection, insulin glargine 28 units daily and ertugiflozin daily  -Diabetic order set initiated with Accu-Cheks 4 times a day, insulin aspart sliding scale and hypoglycemic protocol  -Blood sugars have been 204, 188 and 156  -Resume Januvia at 50 mg daily, and I've increased this to 100 mg daily. Continue to monitor     #Hypothyroidism  -Patient reported previously being on levothyroxine.  TSH is high at admission 69.88  -Since she is likely been off her medicine for several months will restart levothyroxine at 12.5 mcg daily.   I did see that her prior to  admission levothyroxine was 100 mcg. Will increase levothyroxine to 25 mcg daily. Check TSH with free T4 in 4 to 6 weeks     #Hyperlipidemia  -Patient reporting she has been on a statin in the past.  Currently total cholesterol 174 with LDL 80  -Would not restart statin at this time unless she has a history of coronary artery disease or stroke     #Paranoid schizophrenia  -Psychiatry managing       Diet: Very High Consistent Carb (90 g CHO per Meal) Diet    DVT Prophylaxis: Low Risk/Ambulatory with no VTE prophylaxis indicated  Colmenares Catheter: Not present  Central Lines: None  Code Status: Full Code      Disposition Plan   Expected discharge:  Per psychiatry    Total floor/unit time spent was 25 minutes in reviewing the record, medications, lab results and completing documentation. 15 minutes spent in counseling and discussion with patient regarding diagnosis, medications and treatment plan. Care discussed and coordinated with patient and nurse.     CHRIST Hillman [facility]  Hospitalist Service  [facility]  Securely message with the Vocera Web Console (learn more here)  Text page via Slingbox Paging/Directory    ______________________________________________________________________    Interval History   Patient was in the [facility] area.  are present. Patient calm and cooperative. She has no physical complaints and denies fever, chills, shortness of breath, chest pain or nausea. She does have some recollection of her home medicines and tells me she was on Januvia and Lantus. She also reports being on levofloxacin. Patient not clear on what her doses were.    Review of Systems: 10 point review of systems negative except for pertinent positives mentioned in HPI    Data reviewed today: I reviewed all medications, new labs and imaging results over the last 24 hours. I personally reviewed no images or EKG's today.    Physical Exam   Vital Signs: Temp: 97.6  F (36.4  C) Temp src: Oral BP:  133/60 Pulse: 74   Resp: 16 SpO2: 98 % O2 Device: None (Room air)    Weight: 113 lbs 4.8 oz  General Appearance: Alert, calm, sitting in the chair with sunglasses on, no apparent distress  HEENT: Normocephalic, atraumatic  Respiratory: Clear bilaterally, nonlabored  Cardiovascular: S1, S2, rhythm rate regular, negative murmur, negative lower extremity edema  GI: Bowel sounds positive x4, nondistended and nontender  Skin: No visible rashes or bruising  Musculoskeletal: No joint deformity  Neurologic: Alert, speech intact, facial symmetry, moves all extremities equally, sensation intact       Data   Recent Labs   Lab 11/11/21  1126 11/11/21  0652 11/10/21  1952 11/10/21  1654 11/10/21  0954   WBC  --   --   --   --  6.8   HGB  --   --   --   --  13.1   MCV  --   --   --   --  90   PLT  --   --   --   --  226   NA  --   --   --   --  141   POTASSIUM  --   --   --   --  3.9   CHLORIDE  --   --   --   --  105   CO2  --   --   --   --  30   BUN  --   --   --   --  25   CR  --   --   --   --  0.74   ANIONGAP  --   --   --   --  6   SYLVESTER  --   --   --   --  9.3   * 156* 204*   < > 115   ALBUMIN  --   --   --   --  3.1*   PROTTOTAL  --   --   --   --  6.5   BILITOTAL  --   --   --   --  0.8   ALKPHOS  --   --   --   --  108   ALT  --   --   --   --  20   AST  --   --   --   --  18    < > = values in this interval not displayed.     No results found for this or any previous visit (from the past 24 hour(s)).  Medications       amLODIPine  5 mg Oral Daily     aspirin  81 mg Oral Daily     calcium-vitamin D  1 tablet Oral BID     insulin aspart  1-3 Units Subcutaneous TID AC     [START ON 11/12/2021] levothyroxine  25 mcg Oral QAM AC     OLANZapine  5 mg Oral At Bedtime     rosuvastatin  5 mg Oral Daily     [START ON 11/12/2021] sitagliptin  100 mg Oral Daily

## 2021-11-11 NOTE — PLAN OF CARE
Problem: Behavioral Health Plan of Care  Goal: Plan of Care Review  Outcome: Improving  Flowsheets (Taken 11/11/2021 1200)  Plan of Care Reviewed With: patient  Patient Agreement with Plan of Care: agrees  Goal: Patient-Specific Goal (Individualization)  Outcome: Improving  Note:     Goal: Adheres to Safety Considerations for Self and Others  Outcome: Improving  Intervention: Develop and Maintain Individualized Safety Plan  Recent Flowsheet Documentation  Taken 11/11/2021 0900 by Shruti Vazquez RN  Safety Measures:    environmental rounds completed    safety rounds completed  Goal: Absence of New-Onset Illness or Injury  Outcome: Improving  Intervention: Identify and Manage Fall Risk  Recent Flowsheet Documentation  Taken 11/11/2021 0900 by Shruti Vazquez RN  Safety Measures:    environmental rounds completed    safety rounds completed  Goal: Optimized Coping Skills in Response to Life Stressors  Outcome: Improving  Goal: Develops/Participates in Therapeutic Providence to Support Successful Transition  Outcome: Improving  Intervention: Foster Therapeutic Providence  Recent Flowsheet Documentation  Taken 11/11/2021 1200 by Shruti Vazquez RN  Trust Relationship/Rapport:    care explained    questions answered    reassurance provided    thoughts/feelings acknowledged     Problem: Behavior Regulation Impairment (Psychotic Signs/Symptoms)  Goal: Improved Behavioral Control (Psychotic Signs/Symptoms)  Outcome: Improving     Problem: Cognitive Impairment (Psychotic Signs/Symptoms)  Goal: Optimal Cognitive Function (Psychotic Signs/Symptoms)  Outcome: Improving  Intervention: Support and Promote Cognitive Ability  Recent Flowsheet Documentation  Taken 11/11/2021 1200 by Shruti Vazquez RN  Trust Relationship/Rapport:    care explained    questions answered    reassurance provided    thoughts/feelings acknowledged     Problem: Decreased Participation and Engagement (Psychotic Signs/Symptoms)  Goal: Increased  Participation and Engagement (Psychotic Signs/Symptoms)  Outcome: Improving     Problem: Mood Impairment (Psychotic Signs/Symptoms)  Goal: Improved Mood Symptoms (Psychotic Signs/Symptoms)  Outcome: Improving     Problem: Psychomotor Impairment (Psychotic Signs/Symptoms)  Goal: Improved Psychomotor Symptoms (Psychotic Signs/Symptoms)  Outcome: Improving     Problem: Sensory Perception Impairment (Psychotic Signs/Symptoms)  Goal: Decreased Sensory Symptoms (Psychotic Signs/Symptoms)  Outcome: Improving     Problem: Sleep Disturbance (Psychotic Signs/Symptoms)  Goal: Improved Sleep (Psychotic Signs/Symptoms)  Outcome: Improving     Problem: Social, Occupational or Functional Impairment (Psychotic Signs/Symptoms)  Goal: Enhanced Social, Occupational or Functional Skills (Psychotic Signs/Symptoms)  Outcome: Improving  Intervention: Promote Social, Occupational and Functional Ability  Recent Flowsheet Documentation  Taken 11/11/2021 1200 by Shruti Vazquez RN  Trust Relationship/Rapport:    care explained    questions answered    reassurance provided    thoughts/feelings acknowledged     Problem: Sleep Disturbance  Goal: Adequate Sleep/Rest  Outcome: Improving     Problem: Suicidal Behavior  Goal: Suicidal Behavior is Absent or Managed  Outcome: Improving     Pt pleasant on approach. Calm and cooperative. Visible in milieu for meals but minimally engages with peers. Pt is Venezuelan speaking but does seem to understand a lot of english. Order in place for an . Denies SI, HI, SIB, hallucinations and other psychs symptoms. Denies pain, and depression. Rated anxiety a 8/10 declined interventio. Contracts for safety. She is paranoid and thinks someone is out to get her and kill her. New order Zyprexa 5mg at HS, Pt can have sun glassess on the unit.Amlodipine 5mg. Apirin 81mg. Calcium-Vitamin D 250-125mg. Rosuvastatin 5mg.

## 2021-11-11 NOTE — PROGRESS NOTES
11/11/21 1116   Engagement   Intervention Group   Topic Detail Wellness sampler (breathwork, non-tool based sensory activities, and massage) for coping skills and relaxation   Attendance Attended   Patient Response Demonstrated understanding of materials provided;Was respectful;Positive attitude;Contributes to conversation  (Pt shared a favorite stretch with the group and reported her favorite part of the activity was the massage pillow.)   Concentrated on Task duration of group  (Pt had an  present during group)   Cognition Goal-directed   Mood/Affect Pleasant   Social/Behavioral Engaged;Cooperative   Thought Content [place]   Supervision   Supervision On-site supervision provided

## 2021-11-11 NOTE — PLAN OF CARE
Problem: Suicidal Behavior  Goal: Suicidal Behavior is Absent or Managed  Outcome: Improving   Denies any thoughts of SI or HI and contracts for safety.

## 2021-11-11 NOTE — PLAN OF CARE
Assessment/Intervention/Current Symptoms and Care Coordination: Writer met with the patient to introduce self and to check in. However, patient was sleeping and unable to be roused at this time. Additionally, the  had left the unit at this time; writer was told the  would return tomorrow at 9am. Per nursing, patient has been calm, cooperative, and pleasant upon approach. She continues to display symptoms of paranoia that someone is out to get her and kill her. Denies SI/SIB/HI, and hallucinations. Writer spoke to patient's brother-in-law, Sarbjit, to introduce self as patient's coverage . He said the long-term plan is to transition patient back to [place] eventually because that's where all her family is. He reported she goes every winter, and that they have several doctors within their family there. He informed writer that they have already scheduled a primary care appointment for patient at the [facility] in [place] for December 10th. Writer to set patient up with a psychiatrist in the [place] area as well.      Discharge Plan or Goal: Home with sister and stepbrother, outpatient psychiatry and primary care      Barriers to Discharge: Symptom stabilization, med management, care coordination      Referral Status: None at this time      Important patient contacts:  Sarbjit Marge, brother-in-law: [phone]      Legal Status: Voluntary      Savanna Juarez [facility], 11/11/2021, 12:11 PM

## 2021-11-11 NOTE — PLAN OF CARE
Though patient was calm, she slept for about 2 hours on this shift. On approach, she told the writer that she was afraid of sleeping. The writer offered patient sleeping intervention and she refused. She finally went to bed when she was talked to by another nurse. Safety checks were conducted to maintain her safety. She denied having pains and denied anxiety, depression and hallucination. No PRN was given and she was medication compliant.BS was 156 and she got 1 U of  Novolog.

## 2021-11-11 NOTE — PROGRESS NOTES
11/11/21 1513   Engagement   Intervention Group   Topic Detail Card making for creative expression, sequencing, leisure, relaxation, and coping   Attendance Attended   Patient Response Demonstrated understanding of materials provided;Expressed feelings/issues;Was respectful  (Pt expressed wanting to write in her card.)   Concentrated on Task 30 - 45 min   Cognition Goal-directed   Mood/Affect Pleasant   Social/Behavioral Engaged;Cooperative   Thought Content Leiter   Goals addressed in session today Pt quietly engaged in activity. She did not add much to her card in terms of designing (one stamp on both sides) but wrote a birthday greeting in it.   Supervision   Supervision On-site supervision provided      Normal for race

## 2021-11-11 NOTE — PROGRESS NOTES
Pt was up on the unit all shift. She is pleasant and cooperative on approach. She seems to understand English well. Per assist of . She denies SI, HI and contracts for safety. Denies depression but rated anxiety at 10/10 and refused a prn. She is very paranoid and has been in the lounge most of the shift. She believes someone is out to get her and kill her. It took a lot of encouragement for patient to back to her room and lay down as she appeared very tired. I had to sit outside pt. door for her to go to sleep. She has her lights on and her door wide open. She appears to be sleeping now.

## 2021-11-11 NOTE — PROGRESS NOTES
Occupational Therapy   AIDET      Patient was introduced to the role of occupational therapy and oriented to the process of occupational therapy services on the unit, including function of groups (with an  present). Patient was given the Occupational Therapy Questionnaire to complete. Patient had no further questions for writer and began to fill out the form at the conclusion of the meeting. OT will follow up with assessment and address any questions, needs, or concerns.

## 2021-11-11 NOTE — PROGRESS NOTES
"PSYCHIATRY  PROGRESS NOTE     DATE OF SERVICE   11/11/2021     The patient is a 71 year old female who is being evaluated via a video billable telemedicine visit. The patient/guardian has consented to being seen via telemedicine. The provider was in front of a computer in a home office. The patient was on the inpatient unit at [facility].     Start time: 9:44 AM   Stop time: 9:53 AM     The patient/guardian has been notified of the following:     This telemedicine visit is conducted live between you and your clinician. We have found that certain health care needs can be provided without the need for a physical exam. This service lets us provide the care you need with a telemedicine conversation.           CHIEF COMPLAINT   \"I am still scared.\"       SUBJECTIVE   Nursing reports: Pt was up on the unit all shift. She is pleasant and cooperative on approach. She seems to understand English well. Per assist of . She denies SI, HI and contracts for safety. Denies depression but rated anxiety at 10/10 and refused a prn. She is very paranoid and has been in the lounge most of the shift. She believes someone is out to get her and kill her. It took a lot of encouragement for patient to back to her room and lay down as she appeared very tired. I had to sit outside pt. door for her to go to sleep. She has her lights on and her door wide open. She appears to be sleeping now.    Patient has been discuss with SW during team meeting. Patient's brother in law I asking for this writer to give him a call.        OBJECTIVE   Patient was seen and evaluated at bedside with  present during the assessment, this was done with the use of telehealth.  Interview was conducted in [place] citizen by this writer.  Patient reported that she continues to be very scared and is very difficult for her at night because this is when her paranoia increases.  Patient tells me that she was not able to sleep at all and she would " like for her Zyprexa to be increased to 5 mg at bedtime.  Patient also wants for this writer to communicate with her brother-in-law and she is okay with me discussing the medications.  Patient has repeated multiple times that she is okay with what ever her family decides specially her brother-in-law.  Patient tells me that she has been in [place] for only a week and she has not visited with any primary care doctor or psychiatrist.  Patient does not remember when was the last time she saw her psychiatrist in [place].  Patient has a psychiatrist named Sharla Tolentino that she has not seen since before the pandemic started.  As per patient she was doing telehealth with Dr. Tolentino but this was sporadic, later on patient change her phone number and lost communication with the doctor.  Patient did agreed for us to start looking into an outpatient psychiatrist here in [place] as she is planning to stay here for a long time.  Currently the patient is denying having any thoughts about harming herself or harming others and she was able to contract for safety.    Today I was able to communicate with the brother-in-law Sarbjit over the phone.  Brother-in-law informed me that when the patient came from [facility] patient was thinking that someone is after her and the family, she was extremely paranoid not sleeping and acting bizarre. Patient came from [facility] a week ago. Family tried to make an appointmetn with the PCP but patient symptoms were worst.  Patient was constantly carrying a knife around the house, checking doors and making sure the family was safe. Patient stop all of her medications in march. Patient is a  and has not children. She has a few friends in [facility] but they can't attend to her. Her friends call the family to come and get her as she was not doing well.    Sarbjit remember that at one point the patient said that one of the medications was making her shaky, she was not able to move her arms or legs.  He  is not sure if this medication was Zyprexa but he remembered that the patient has been taking this medication for years.  Usually when the patient takes the Zyprexa she goes to sleep with no issues and appears to be stable.  He also tells me that the patient came from [place] with a bag that contained multiple medications but the patient has not been taking them.    I have discussed with Sarbjit that we had a conversation with the pharmacist and we were able to obtain a list of patient's medications.  I have sent note to the medical team and they will be reviewing the list of medications also.  In that list of medications Zyprexa was not listed, brother-in-law tells me that he thinks Zyprexa was discontinued by patient's primary care doctor.  I have reviewed with Sarbjit the use of Zyprexa including reasons for prescribing these medications, benefits, risks and side effects.  I also had a discussion about this medication having an FDA black box warning in the elderly, but at this time I believe that the benefits of the medication outweigh the risk.  On the other hand the medical team is closely following the patient making sure that patient's blood sugar and thyroid are under control.  Sarbjit verbalized good understanding and he is in agreement with the patient restarting Zyprexa.  Family is also very supportive of the patient staying in the hospital as long as this needed.     MEDICATIONS   Medications:  Scheduled Meds:    insulin aspart  1-3 Units Subcutaneous TID AC     levothyroxine  12.5 mcg Oral QAM AC     OLANZapine  5 mg Oral At Bedtime     sitagliptin  50 mg Oral Daily     Continuous Infusions:  PRN Meds:.acetaminophen, alum & mag hydroxide-simethicone, glucose **OR** dextrose **OR** glucagon, gabapentin, nicotine, OLANZapine **OR** OLANZapine, polyethylene glycol, traZODone    Medication adherence issues: MS Med Adherence Y/N: Yes, Hospitalization  Medication side effects: MEDICATION SIDE EFFECTS: no side  effects reported  Benefit: Yes / No: Yes       ROS   A comprehensive review of systems was negative.       MENTAL STATUS EXAM   Vitals: /60 (Patient Position: Sitting)   Pulse 74   Temp 97.6  F (36.4  C) (Oral)   Resp 16   Wt 51.4 kg (113 lb 4.8 oz)   SpO2 98%     Appearance:  No apparent distress  Mood:  {Mood: Fearful  Affect: restricted  was congruent to speech  Suicidal Ideation: PRESENT / ABSENT: absent   Homicidal Ideation: PRESENT / ABSENT: absent   Thought process: concrete   Thought content: significant for obsessions  and paranoid ideation.   Fund of Knowledge: Average  Attention/Concentration: Normal  Language ability:  Intact  Memory:  Immediate recall intact, Short-term memory intact and Long-term memory intact  Insight:  fair.  Judgement: fair  Orientation: Yes, x4  Psychomotor Behavior: normal or unremarkable    Muscle Strength and Tone: MuscleStrength: Normal  Gait and Station: not evaluated        LABS   personally reviewed.     No results found for: PHENYTOIN, PHENOBARB, VALPROATE, CBMZ       DIAGNOSIS   Principal Problem:    Paranoid schizophrenia, chronic condition with acute exacerbation (H)    Active Problem List:  Patient Active Problem List   Diagnosis     Psychosis (H)     Paranoid schizophrenia, chronic condition with acute exacerbation (H)     Type 2 diabetes mellitus without complication, with long-term current use of insulin (H)     Acquired hypothyroidism     Hyperlipidemia LDL goal <130     Personal history of noncompliance with medical treatment, presenting hazards to health          PLAN   1. Ongoing education given regarding diagnostic and treatment options with risks, benefits and alternatives and adequate verbalization of understanding.  2. Medications     Increase Zyprexa to 5 mg at bedtime  3.   coordinating a safe discharge plan with the family.  4.  Medical team currently following the patient.        Risk Assessment: [facility] RISK ASSESSMENT: Patient  able to contract for safety    Coordination of Care:   Treatment Plan reviewed and physician signed, Care discussed with Care/Treatment Team Members, Chart reviewed and Patient seen      Re-Certification I certify that the inpatient psychiatric facility services furnished since the previous certification were, and continue to be, medically necessary for, either, treatment which could reasonably be expected to improve the patient s condition or diagnostic study and that the hospital records indicate that the services furnished were, either, intensive treatment services, admission and related services necessary for diagnostic study, or equivalent services.     I certify that the patient continues to need, on a daily basis, active treatment furnished directly by or requiring the supervision of inpatient psychiatric facility personnel.   I estimate 7 days of hospitalization is necessary for proper treatment of the patient. My plans for post-hospital care for this patient are  home with family     Cyndi Hill MD    -     11/11/2021  -     11:42 AM    Total time  45 minutes with > 50%spent on coordination of cares and psycho-education.    This note was created with help of Dragon dictation system. Grammatical / typing errors are not intentional.    Cyndi Hill MD

## 2021-11-11 NOTE — CONSULTS
DIABETES CARE  Consulted by Provider for Diabetes Education    71 year old female with type 2 diabetes. Patient was admitted for psychiatric evaluation due to erratic and paranoid behaviors.    Related Co-morbidities include: Medication noncompliance, HLD, Type 2 diabetes    PCP: No primary provider yet/ just moved from [place].  Social: recently living with brother in law and sister in [place]    Nutrition & Diabetes History: Patient states she has been diagnosed with diabetes Type 2 since 2004 or so.     Meds for BG Management PTA:  -Januvia 100 mg daily  -Lantus 28 units daily but reports issues with dose.   -Trulicity 0.75 mg per 0.5 ml once weekly    Current Inpatient Meds for BG Management:  -100 mg Januvia  -Novolog low correction dose 1 drops 100      Labs:  Hemoglobin A1C:9.3            Hgb:13.1   GFR:82   BGs:   Results for BRYANT RG (MRN [id number]) as of 11/11/2021 15:07   Ref. Range 11/10/2021 09:54 11/10/2021 16:54 11/10/2021 19:52 11/11/2021 06:52 11/11/2021 11:26   Glucose Latest Ref Range: 70 - 125 mg/dL 115       GLUCOSE BY METER POCT Latest Ref Range: 70 - 99 mg/dL  385 (H) 204 (H) 156 (H) 188 (H)       Diet Order:  90 gram CHO Intake: Good   Weight: 51.4 kg     DM EDUCATION/COUNSELING:  Barriers to Learning and/or DM Self-Management: Language/ she did not want to use [place] citizen interpretor  Previous DM Education:   Yesw  Current Education and/or visit with Patient and/or caregiver(s):  Reviewed diabetes disease with patient. Discussed current blood sugars/A1C and target/goal numbers. Reviewed importance of checking blood glucose levels and keeping log and/or bringing meter to all f/u visits. Reviewed hypoglycemia/hyperglycemia symptoms and risks of complications with continued high blood glucose.    Discussed diet and exercise as important components to good BG control. Briefly reviewed diabetic diet including what foods have cho and portions.     Reviewed insulin pen use.  Also discussed  "site rotation, proper storage and safe needle disposal.     Patient states she has meter at home. Did not bring with her.   She has not been taking medicaiton for awhile.    Told her as she stays here they will figure out her medication plan.   She should follow up with a provider and outpatient diabetes educator once discharged.       (See also \"Diabetic Ed Flowsheet\"  Or Education tab-diabetes for any education topic details.)    ASSESSMENT and RECOMMENDATIONS:  Just restarting on medicaton.  Will need to figure out what is needed for blood glucose control. Do have a listing of what she used to take at one point in time.  Do think she probably was taking too much Lantus and possibly had lows and quit taking. She may need some Lantus but maybe not as much.   Januvia at 100 mg may also help and that was changed recently from 50 mg to 100 mg dose.     Follow up for diabetes management would be good for her when she is discharged.     For inpatient diabetes management guidelines refer to \"Guidelines for Subcutaneous Insulin Dosing\" found in the DIAB Subcutaneous Insulin Management Adult order set.       DISCHARGE NEEDS:   Whatever medications she is on by discharge will need to be ordered.   As of now she will need   Januvia 100 mg.        Thank you,   Manasa Clark RN, [facility]  Diabetes Care   VM: [phone]  Pager: [phone]             "

## 2021-11-12 LAB
GLUCOSE BLDC GLUCOMTR-MCNC: 130 MG/DL (ref 70–99)
GLUCOSE BLDC GLUCOMTR-MCNC: 219 MG/DL (ref 70–99)
GLUCOSE BLDC GLUCOMTR-MCNC: 232 MG/DL (ref 70–99)
GLUCOSE BLDC GLUCOMTR-MCNC: 254 MG/DL (ref 70–99)

## 2021-11-12 PROCEDURE — 128N000001 HC R&B CD/MH ADULT

## 2021-11-12 PROCEDURE — 250N000012 HC RX MED GY IP 250 OP 636 PS 637: Performed by: NURSE PRACTITIONER

## 2021-11-12 PROCEDURE — 99232 SBSQ HOSP IP/OBS MODERATE 35: CPT | Performed by: PSYCHIATRY & NEUROLOGY

## 2021-11-12 PROCEDURE — 250N000013 HC RX MED GY IP 250 OP 250 PS 637: Performed by: NURSE PRACTITIONER

## 2021-11-12 RX ADMIN — AMLODIPINE BESYLATE 5 MG: 5 TABLET ORAL at 08:30

## 2021-11-12 RX ADMIN — INSULIN GLARGINE 10 UNITS: 100 INJECTION, SOLUTION SUBCUTANEOUS at 21:29

## 2021-11-12 RX ADMIN — SITAGLIPTIN 100 MG: 25 TABLET, FILM COATED ORAL at 08:30

## 2021-11-12 RX ADMIN — ASPIRIN 81 MG: 81 TABLET, COATED ORAL at 08:30

## 2021-11-12 RX ADMIN — ROSUVASTATIN CALCIUM 5 MG: 5 TABLET, FILM COATED ORAL at 08:30

## 2021-11-12 RX ADMIN — LEVOTHYROXINE SODIUM 25 MCG: 25 TABLET ORAL at 06:47

## 2021-11-12 RX ADMIN — INSULIN ASPART 1 UNITS: 100 INJECTION, SOLUTION INTRAVENOUS; SUBCUTANEOUS at 11:29

## 2021-11-12 RX ADMIN — INSULIN ASPART 1 UNITS: 100 INJECTION, SOLUTION INTRAVENOUS; SUBCUTANEOUS at 06:49

## 2021-11-12 ASSESSMENT — ACTIVITIES OF DAILY LIVING (ADL)
PREVIOUS_RESPONSIBILITIES: MEAL PREP;HOUSEKEEPING;LAUNDRY;SHOPPING;MEDICATION MANAGEMENT;FINANCES
ORAL_HYGIENE: INDEPENDENT
DRESS: INDEPENDENT
HYGIENE/GROOMING: INDEPENDENT

## 2021-11-12 NOTE — PLAN OF CARE
Problem: Behavioral Health Plan of Care  Goal: Plan of Care Review  Outcome: Improving  Flowsheets (Taken 11/12/2021 6451)  Plan of Care Reviewed With: patient  Patient Agreement with Plan of Care: agrees     Problem: Psychomotor Impairment (Psychotic Signs/Symptoms)  Goal: Improved Psychomotor Symptoms (Psychotic Signs/Symptoms)  Outcome: Improving     Problem: Suicidal Behavior  Goal: Suicidal Behavior is Absent or Managed  Outcome: Improving    Pt denied pain/discomfort this shift. Patient rates anxiety 8/10 and denies all other psych symptoms. Mood calm and pleasant. Declined to take calcium tablet stated it's hard to swallow, this writer offered to break the tab into half and use applesauce/pudding and patient declined. Left a sticky note for MD. Contract for safety. Visible in the milieu. Patient prefers to wear sunglasses. Contracts for safety.

## 2021-11-12 NOTE — PLAN OF CARE
Assessment/Intervention/Current Symtoms and Care Coordination  Writer met with patient to discuss discharge. Patient was able to communicate well in English however stated that she was comfortable having interpretor present. Patient is in agreement with staying in hospital to manage her medications and adjust if needed. She will review providers for psychiatry with writer on Monday. Patient did not have any other questions at this time    Discharge Plan or Goal Return to brother in law/sister's home, [facility] and psychiatry.      Barriers to Discharge Symptom stabilization medication management and care coordination    Referral Status pending psychiatry in [place]    Legal Status Voluntary      Zaida Crane MA [facility], 11/12/2021, 2:44 PM

## 2021-11-12 NOTE — PROGRESS NOTES
[facility]    Medicine Progress Note - Hospitalist Service       Date of Admission:  11/9/2021    Brief Summary: Linnette Monsivais is a 71 year old [place] speaking female who presented to [facility] emergency department on 11/9/2021 for psychiatric evaluation.  Brought to emergency department by brother-in-law who reports increased erratic and paranoid behaviors.  Recently moved from [place] [id number].  History of paranoid schizophrenia, hypothyroidism, diabetes mellitus and hyperlipidemia.  Admitted to inpatient behavioral health unit at [facility] on 11/9/2021 for further manage of psychiatric illness.  Hospital medicine consult for medical management, new admission.     Assessment & Plan         #Medication noncompliance  -Reported that patient has not taken her medicines since March 2021.  TSH is 69.88 and hemoglobin A1c is 9.3.   -Medication reconciliation completed.   Given that she had stopped her medications for several months I am starting patient on lower dose of levothyroxine. And holding patient's Lantus and ertugliflozin      #Elevated blood pressure without diagnosis of hypertension  -Blood pressure improved after restarting amlodipine; 118/63   -Continue amlodipine 5 mg daily     #Type 2 diabetes  -Hemoglobin A1c 9.3%.  Patient reports previous A1c was 7  -Prior to admission medications Januvia 100 mg daily, Trulucity weekly injection, insulin glargine 28 units daily and ertugiflozin daily  -Diabetic order set initiated with Accu-Cheks 4 times a day, insulin aspart sliding scale and hypoglycemic protocol  -Blood sugars have been slightly higher last 24 hours 219, 232 and 283. receiving on average 1 to 3 units of insulin aspart insulin with meals.  Will restart insulin glargine at 10 units daily at bedtime.    -Continue Januvia at 100 mg daily.  Adjust medicines as needed to maintain adequate blood sugar control.  Avoid hypoglycemia     #Hypothyroidism  -Patient  reported previously being on levothyroxine.  TSH is high at admission 69.88  -Since she is likely been off her medicine for several months. Levothyroxine restarted at 25 mcg daily. Check TSH with free T4 in 4 to 6 weeks     #Hyperlipidemia  -Patient reporting she has been on a statin in the past.  Currently total cholesterol 174 with LDL 80  -PTA crestor has been restarted.     #Paranoid schizophrenia  -Psychiatry managing       Diet: Very High Consistent Carb (90 g CHO per Meal) Diet    DVT Prophylaxis: Low Risk/Ambulatory with no VTE prophylaxis indicated  Colmenares Catheter: Not present  Central Lines: None  Code Status: Full Code      Disposition Plan   Expected discharge:  per psychiatry     Total floor/unit time spent was 25 minutes in reviewing the record, medications, lab results and completing documentation. 15 minutes spent in counseling and discussion with patient regarding diagnosis, medications and treatment plan. Care discussed and coordinated with patient and nurse.     CHRIST Hillman [facility]  Hospitalist Service  [facility]  Securely message with the Vocera Web Console (learn more here)  Text page via [employer] Paging/Directory             ______________________________________________________________________    Interval History   Patient sitting in the [facility] area with .  She is speaking English well today and did not require much help from the .  I asked patient about the carbidopa levodopa medicine which is on her prior to admission list.  Patient reported that she had a 4-day spell of generalized weakness, had gone to the doctor who prescribed this medicine.  She said she took it for short time and has not taken it for several months.  She does not have a history of neurological disorder or Parkinson's disease and would prefer to not take the carbidopa levodopa.  Noted tremor during our visit today.  Regarding her diabetes patient said at one  "time she had a One Touch glucose meter and had been checking her blood sugars a couple of times a day, but not consistently.  She thinks she has a glucose meter and supplies at her brother-in-law's house but is not completely sure of this and is asking to be provided a new glucose meter prior to discharge.  Discussed her blood sugars which have been in the 200s and she is agreeable to restarting the insulin glargine at a smaller dose this evening.  Patient denies any physical issues including fever, chills, shortness of breath, chest pain, nausea, constipation.  She reports that her mood is \"down.\"    Review of Systems: 10 point review of systems negative except for pertinent positives mentioned in HPI    Data reviewed today: I reviewed all medications, new labs and imaging results over the last 24 hours. I personally reviewed no images or EKG's today.    Physical Exam   Vital Signs: Temp: 97.7  F (36.5  C) Temp src: Oral BP: 118/63 Pulse: 76   Resp: 16 SpO2: 98 % O2 Device: None (Room air)    Weight: 113 lbs 4.8 oz    General Appearance: Alert, calm, sitting in the chair with sunglasses on, no apparent distress  HEENT: Normocephalic, atraumatic  Respiratory: Clear bilaterally, nonlabored  Cardiovascular: S1, S2, rhythm rate regular, negative murmur, negative lower extremity edema  GI: Bowel sounds positive x4, nondistended and nontender  Skin: No visible rashes or bruising  Musculoskeletal: No joint deformity  Neurologic: Alert, speech intact, facial symmetry, moves all extremities equally, sensation intact, no noted tremor      Data   Recent Labs   Lab 11/12/21  1110 11/12/21  0647 11/11/21  2012 11/10/21  1654 11/10/21  0954   WBC  --   --   --   --  6.8   HGB  --   --   --   --  13.1   MCV  --   --   --   --  90   PLT  --   --   --   --  226   NA  --   --   --   --  141   POTASSIUM  --   --   --   --  3.9   CHLORIDE  --   --   --   --  105   CO2  --   --   --   --  30   BUN  --   --   --   --  25   CR  --   --   " --   --  0.74   ANIONGAP  --   --   --   --  6   SYLVESTER  --   --   --   --  9.3   * 232* 283*   < > 115   ALBUMIN  --   --   --   --  3.1*   PROTTOTAL  --   --   --   --  6.5   BILITOTAL  --   --   --   --  0.8   ALKPHOS  --   --   --   --  108   ALT  --   --   --   --  20   AST  --   --   --   --  18    < > = values in this interval not displayed.     No results found for this or any previous visit (from the past 24 hour(s)).  Medications       amLODIPine  5 mg Oral Daily     aspirin  81 mg Oral Daily     calcium carbonate-vitamin D  1 tablet Oral Daily     insulin aspart  1-3 Units Subcutaneous TID AC     insulin glargine  10 Units Subcutaneous At Bedtime     levothyroxine  25 mcg Oral QAM AC     OLANZapine  5 mg Oral At Bedtime     rosuvastatin  5 mg Oral Daily     sitagliptin  100 mg Oral Daily

## 2021-11-12 NOTE — PROGRESS NOTES
"   11/12/21 1510   Engagement   Intervention Group   Topic Detail geometric painting for creative expression, relaxation, and coping   Attendance Attended   Patient Response Needs reinforcement/repetition to learn materials;Contributes to conversation;Was respectful  (Pt required an additional verbal cue to sequence painting activity (wash brush before using a new color). Pt responded to check-in question asking how she is creative stating \"I am not creative\".)   Concentrated on Task duration of group   Cognition Goal-directed;Follows through with task   Mood/Affect Pleasant   Social/Behavioral Engaged;Cooperative  (Even after she completed her painting she chose to stay and watch her peers finish their paintings. Pt also reported liking the activity.)   Thought Content [place]   Supervision   Supervision On-site supervision provided     "

## 2021-11-12 NOTE — PLAN OF CARE
Patient was calm and non-aggressive, she slept for about 4 hours without any distress. Safety checks were conducted every 15 minutes to ensure her safety.No pain was reported and patient denied anxiety, depression and hallucination.Patient was medication compliant, BS was 232 and 1 U of Novolog given.

## 2021-11-12 NOTE — PLAN OF CARE
"   11/12/21 [facility]   Occupational Therapy Psychosocial Assessment   Assessment Type Interview;Interview Form   Prior Level of Function   People in home sibling(s)  (Living with sister and brother-in-law)   Current Living Arrangements house   Transportation Anticipated family or friend will provide   ADLs   Activity/Exercise/Self-Care Comment Pt reported that she does not exercise much anymore \"because I am 71\"   Instrumental Activities of Daily Living (IADL)   Previous Responsibilities meal prep;housekeeping;laundry;shopping;medication management;finances  (These were responsibilities pt reported having when she lived in [place])   IADL Comments    Functional Mobility   Functional Mobility no concerns   Equipment Currently Used at Home none   Therapy Assessment   Patient Presentation Per H&P: \"This is a 71 year old female with history of Paranoid schizophrenia, chronic condition with acute exacerbation (H) and was admitted to the psychiatric inpatient unit due to increased paranoia, delusion and psychosis in the context of medication noncompliance.\" Pt was receptive to meeting with writer to follow up on assessment form. An  was present but not used, as  reported pt told him she did not need him. Pt was pleasant and filled out a new form with assistance from writer as she couldn't find hers. She seemed to understand writer but often times asked writer to clarify what she meant (I.e. writer asked what type of activities she enjoys and pt reported \"what is that?\"). Pt demonstrated poor insight into her mental health symptoms sharing that her TV is \"playing tricks and showing my thoughts\". She also made several statements that contradicted themselves. For example she shared she has been able to manage her medications but then shared the TV started talking to her when she stopped talking her medication.    Patient-identified areas of success Time spent with family or friends;Time spent relaxing and " "enjoying   Healthy Leisure Activities Pt reported she enjoys listening to the radio (listening to the \"oldies\") and dancing   Patient-identified areas of difficulty Difficulty concentrating;Sleeping too much or not enough;Transportation   Patient-identified sources of support Safe place to live;Family, friends or caregivers to help when needed   Patient-identified emotional, physical or mental health concerns \"schizophrenia and paranoia\"   Patient-identified coping stategies for these concerns Praying   Patient-identified stressors or changes in the past year \"emotional changes\" (pt later ID the \"TV playing tricks and showing my thoughts\" and moving from [place] to [place])   Patient-identified values Family   Patient's goal for the future Pt was unable to ID a goal she has for the future.   Patient-identified community resources Other (see comments)  (unable to ID)   Patient's goals to work on with OT Feel better;Learn ways to stay well and avoid coming to the hospital;Share own thoughts and feelings;Improve sleep;Improve concentration;Relax and enjoy oneself;Develop a satisfying daily routine   Clinical Impression   Patient Personal Strengths expressive of emotions;family/social support   Pt appears to have the following barriers/limitations Limited insight into mental health symptoms;Lack of daily routine   Patient's barriers/limitations contribute to Exacerbation of mental health symptoms   Planned Interventions Encourage group attendance;Identify and develop coping strategies;Continue to build rapport;Encourage engagement in meaningful activities   Risk & Benefits of therapy have been explained participants included;patient   Minutes Spent with Patient 15   Beh OT Plan for Next Session Pt will ID 3 healthy coping skills to reduce symptoms this review period.   Student Supervision-Eval Only    Student Supervision On-site supervision provided     "

## 2021-11-12 NOTE — PROVIDER NOTIFICATION
11/12/21 1500   Engagement   Intervention Group   Topic Self-worth/hopefulness   Topic Detail SW process group: Resilience   Attendance Attended   Patient Response Demonstrated understanding of materials provided;Expressed feelings/issues   Concentrated on Task duration of group   Cognition Goal-directed;Follows through with task   Mood/Affect Pleasant   Social/Behavioral Cooperative;Engaged   Thought Content [place]     GROUP LENGTH (MINS):   40 minutes   RELEVANT PRIMARY DIAGNOSIS: Patient Active Problem List   Diagnosis     Psychosis (H)     Paranoid schizophrenia, chronic condition with acute exacerbation (H)     Type 2 diabetes mellitus without complication, with long-term current use of insulin (H)     Acquired hypothyroidism     Hyperlipidemia LDL goal <130     Personal history of noncompliance with medical treatment, presenting hazards to health        TREATMENT MODALITY:      [x]CBT       []DBT       []ACT       []Interpersonal psychotherapy                                 [x]Psychoeducational therapy       []Skill development       []Other:        ATTENDANCE:        [x]Stayed for entire group     []Arrived late    [] Left early       # OF PATIENTS IN GROUP: 3   BILLABLE:     []Yes            [x]No   Notes:

## 2021-11-12 NOTE — PROGRESS NOTES
11/12/21 1030   Engagement   Intervention Group   Topic Detail Qigong for wellness, relaxation, and coping with sx through distractions   Attendance Attended   Patient Response Demonstrated understanding of materials provided;Was respectful;Contributes to conversation;Expressed feelings/issues  (Pt shared with writer that she couldn't look at the TV (per nursing it triggers her). She was receptive to watching writer to complete the activity. She did not provide a clear response to the check-in question regarding favorite type of exercise.)   Concentrated on Task 30 - 45 min   Mood/Affect Flat   Social/Behavioral Cooperative  (Pt engaged in approx. half of the movements copying the writer. However, when she wasn't participating she was just sitting in her chair.)   Thought Content [place]   Supervision   Supervision On-site supervision provided

## 2021-11-12 NOTE — PROGRESS NOTES
"PSYCHIATRY  PROGRESS NOTE     DATE OF SERVICE   11/12/2021        CHIEF COMPLAINT   \"I am worried.\"       SUBJECTIVE   Nursing reports: Pt. Calm and pleasant on approach. Out in the milieu all shift engaged with peers and staff, forgets how to pronounce certain words. Refused calcium med stating it's hard to swallow refused to take with pudding or break med in half. Pt. Thinks that someone might be poisoning her food and trying to kill her, she played with her dinner and eat 50%, bedtime snack 100%.  Anxiety 8, depression 0  Denies pain SI/ HI and hallucinations, contracts for safety.    Patient has been discuss with SW earlier today.  I have informed the  that we are still in the process of adjusting this patient's medications.       OBJECTIVE   Patient was seen and evaluated in the day area with nurse practitioner student present during the assessment, interview was conducted in Cuban by this writer.  This was a face-to-face assessment.  Patient reported that she is still feeling worried and scared.  Patient said that she has been compliant with the medications and would like to continue taking Zyprexa 5 mg at bedtime.  Patient stated that she did woke up twice last night because \"something told her to wake up\".  Patient also said that she felt that her head was extremely hot.  Patient does not know what was this and on the other hand she feels that she is doing a little bit better.  Patient presented as calm but continues to be suspicious and paranoid.  When I talked to the patient about the possibility of increasing the dose of Zyprexa patient stated that she would like to continue to take only 5 mg for now.  Patient also does not want to add the melatonin because according to the patient she has taken this in the past and he has not been effective.    At the end we agreed to continue with the Zyprexa 5 mg at bedtime, monitor how she does over the weekend and then on Monday decide if Zyprexa can " "be increased or not.  Patient verbalized good understanding of the plan and she is in agreement with it.     MEDICATIONS   Medications:  Scheduled Meds:    amLODIPine  5 mg Oral Daily     aspirin  81 mg Oral Daily     calcium carbonate-vitamin D  1 tablet Oral Daily     insulin aspart  1-3 Units Subcutaneous TID AC     insulin glargine  10 Units Subcutaneous At Bedtime     levothyroxine  25 mcg Oral QAM AC     OLANZapine  5 mg Oral At Bedtime     rosuvastatin  5 mg Oral Daily     sitagliptin  100 mg Oral Daily     Continuous Infusions:  PRN Meds:.acetaminophen, alum & mag hydroxide-simethicone, glucose **OR** dextrose **OR** glucagon, gabapentin, nicotine, OLANZapine **OR** OLANZapine, polyethylene glycol, traZODone    Medication adherence issues: MS Med Adherence Y/N: Yes, Hospitalization  Medication side effects: MEDICATION SIDE EFFECTS: no side effects reported  Benefit: Yes / No: Yes       ROS   A comprehensive review of systems was negative.       MENTAL STATUS EXAM   Vitals: /63 (BP Location: Left arm)   Pulse 76   Temp 97.7  F (36.5  C) (Oral)   Resp 16   Wt 51.4 kg (113 lb 4.8 oz)   SpO2 98%     Appearance:  No apparent distress  Mood: \"I am scared\"  Affect: restricted but calm  was congruent to speech  Suicidal Ideation: PRESENT / ABSENT: absent   Homicidal Ideation: PRESENT / ABSENT: absent   Thought process: More linear and goal-directed  Thought content: significant for obsessions  and paranoid ideation.   Fund of Knowledge: Average  Attention/Concentration: Normal  Language ability:  Intact  Memory:  Immediate recall intact, Short-term memory intact and Long-term memory intact  Insight:  fair.  Judgement: fair  Orientation: Yes, x4  Psychomotor Behavior: normal or unremarkable    Muscle Strength and Tone: MuscleStrength: Normal  Gait and Station: Stable on her feet       LABS   personally reviewed.     No results found for: PHENYTOIN, PHENOBARB, VALPROATE, CBMZ       DIAGNOSIS   Principal " Problem:    Paranoid schizophrenia, chronic condition with acute exacerbation (H)    Active Problem List:  Patient Active Problem List   Diagnosis     Psychosis (H)     Paranoid schizophrenia, chronic condition with acute exacerbation (H)     Type 2 diabetes mellitus without complication, with long-term current use of insulin (H)     Acquired hypothyroidism     Hyperlipidemia LDL goal <130     Personal history of noncompliance with medical treatment, presenting hazards to health          PLAN   1. Ongoing education given regarding diagnostic and treatment options with risks, benefits and alternatives and adequate verbalization of understanding.  2. Medications       Zyprexa to 5 mg at bedtime    3.   coordinating a safe discharge plan with the family.  4.  Medical team currently following the patient.        Risk Assessment: [facility] RISK ASSESSMENT: Patient able to contract for safety    Coordination of Care:   Treatment Plan reviewed and physician signed, Care discussed with Care/Treatment Team Members, Chart reviewed and Patient seen      Re-Certification I certify that the inpatient psychiatric facility services furnished since the previous certification were, and continue to be, medically necessary for, either, treatment which could reasonably be expected to improve the patient s condition or diagnostic study and that the hospital records indicate that the services furnished were, either, intensive treatment services, admission and related services necessary for diagnostic study, or equivalent services.     I certify that the patient continues to need, on a daily basis, active treatment furnished directly by or requiring the supervision of inpatient psychiatric facility personnel.   I estimate 7 days of hospitalization is necessary for proper treatment of the patient. My plans for post-hospital care for this patient are  home with family     Cyndi Hill MD    -     11/12/2021  -      11:17 AM    Total time 25 minutes with > 50%spent on coordination of cares and psycho-education.     This note was created with help of Dragon dictation system. Grammatical / typing errors are not intentional.    Cyndi Hill MD

## 2021-11-12 NOTE — PLAN OF CARE
Problem: Behavioral Health Plan of Care  Goal: Optimized Coping Skills in Response to Life Stressors  Outcome: No Change     Problem: Behavioral Health Plan of Care  Goal: Adheres to Safety Considerations for Self and Others  Outcome: Improving    Pt. Calm and pleasant on approach. Out in the milieu all shift engaged with peers and staff, forgets how to pronounce certain words. Refused calcium med stating it's hard to swallow refused to take with pudding or break med in half. Pt. Thinks that someone might be poisoning her food and trying to kill her, she played with her dinner and eat 50%, bedtime snack 100%.  Anxiety 8, depression 0  Denies pain SI/ HI and hallucinations, contracts for safety.

## 2021-11-13 LAB
GLUCOSE BLDC GLUCOMTR-MCNC: 193 MG/DL (ref 70–99)
GLUCOSE BLDC GLUCOMTR-MCNC: 205 MG/DL (ref 70–99)
GLUCOSE BLDC GLUCOMTR-MCNC: 230 MG/DL (ref 70–99)
GLUCOSE BLDC GLUCOMTR-MCNC: 295 MG/DL (ref 70–99)

## 2021-11-13 PROCEDURE — 128N000001 HC R&B CD/MH ADULT

## 2021-11-13 PROCEDURE — 250N000012 HC RX MED GY IP 250 OP 636 PS 637: Performed by: NURSE PRACTITIONER

## 2021-11-13 PROCEDURE — 250N000013 HC RX MED GY IP 250 OP 250 PS 637: Performed by: NURSE PRACTITIONER

## 2021-11-13 PROCEDURE — 250N000013 HC RX MED GY IP 250 OP 250 PS 637: Performed by: PSYCHIATRY & NEUROLOGY

## 2021-11-13 RX ADMIN — INSULIN GLARGINE 10 UNITS: 100 INJECTION, SOLUTION SUBCUTANEOUS at 20:10

## 2021-11-13 RX ADMIN — ROSUVASTATIN CALCIUM 5 MG: 5 TABLET, FILM COATED ORAL at 07:37

## 2021-11-13 RX ADMIN — INSULIN ASPART 1 UNITS: 100 INJECTION, SOLUTION INTRAVENOUS; SUBCUTANEOUS at 11:43

## 2021-11-13 RX ADMIN — AMLODIPINE BESYLATE 5 MG: 5 TABLET ORAL at 07:46

## 2021-11-13 RX ADMIN — INSULIN ASPART 1 UNITS: 100 INJECTION, SOLUTION INTRAVENOUS; SUBCUTANEOUS at 17:00

## 2021-11-13 RX ADMIN — ASPIRIN 81 MG: 81 TABLET, COATED ORAL at 07:38

## 2021-11-13 RX ADMIN — SITAGLIPTIN 100 MG: 25 TABLET, FILM COATED ORAL at 07:38

## 2021-11-13 RX ADMIN — INSULIN ASPART 1 UNITS: 100 INJECTION, SOLUTION INTRAVENOUS; SUBCUTANEOUS at 07:37

## 2021-11-13 RX ADMIN — LEVOTHYROXINE SODIUM 25 MCG: 25 TABLET ORAL at 07:38

## 2021-11-13 ASSESSMENT — ACTIVITIES OF DAILY LIVING (ADL)
DRESS: SCRUBS (BEHAVIORAL HEALTH);INDEPENDENT
ORAL_HYGIENE: INDEPENDENT
HYGIENE/GROOMING: INDEPENDENT

## 2021-11-13 NOTE — PROGRESS NOTES
[facility]    Medicine Progress Note - Hospitalist Service       Date of Admission:  11/9/2021    Brief Summary: Linnette Monsivais is a 71 year old Thai speaking female who presented to [facility] emergency department on 11/9/2021 for psychiatric evaluation.  Brought to emergency department by brother-in-law who reports increased erratic and paranoid behaviors.  Recently moved from [place] [id number].  History of paranoid schizophrenia, hypothyroidism, diabetes mellitus and hyperlipidemia.  Admitted to inpatient behavioral health unit at [facility] on 11/9/2021 for further manage of psychiatric illness.  Hospital medicine consult for medical management, new admission.     Assessment & Plan             #Medication noncompliance  -Reported that patient has not taken her medicines since March 2021.  TSH is 69.88 and hemoglobin A1c is 9.3.   -Medication reconciliation completed.   Given that she had stopped her medications for several months I am starting patient on lower dose of levothyroxine. And holding patient's Lantus and ertugliflozin      #Elevated blood pressure without diagnosis of hypertension  -Blood pressure improved after restarting amlodipine; 123/62  -Continue amlodipine 5 mg daily  -Patient does have some mild to moderate lower extremity ankle edema.  Patient recently moved from [place] and needs to establish care with a new primary care provider.  If worsening edema could consider discontinuing amlodipine for alternative agent as amlodipine can contribute to lower extremity edema.     #Type 2 diabetes  -Hemoglobin A1c 9.3%.  Patient reports previous A1c was 7  -Prior to admission medications Januvia 100 mg daily, Trulucity weekly injection, insulin glargine 28 units daily and ertugiflozin daily  -Diabetic order set initiated with Accu-Cheks 4 times a day, insulin aspart sliding scale and hypoglycemic protocol  -Blood sugars have been 193, 205, 254. receiving on  average 1 to 3 units of insulin aspart insulin with meals. -  insulin glargine restarted 11/12/21 at 10 units daily at bedtime.    -Continue Januvia at 100 mg daily.  Adjust medicines as needed to maintain adequate blood sugar control.  Avoid hypoglycemia     #Hypothyroidism  -Patient reported previously being on levothyroxine.  TSH is high at admission 69.88  -Since she is likely been off her medicine for several months. Levothyroxine restarted at 25 mcg daily. Check TSH with free T4 in 4 to 6 weeks     #Hyperlipidemia  -Patient reporting she has been on a statin in the past.  Currently total cholesterol 174 with LDL 80  -PTA crestor has been restarted.     #Paranoid schizophrenia  -Psychiatry managing         Diet: Very High Consistent Carb (90 g CHO per Meal) Diet    DVT Prophylaxis: Low Risk/Ambulatory with no VTE prophylaxis indicated  Colmenares Catheter: Not present  Central Lines: None  Code Status: Full Code      Disposition Plan   Expected discharge:  per psychiatry, possibly Monday 11/15/21    Total floor/unit time spent was 15 minutes in reviewing the record, medications, lab results and completing documentation. 8 minutes spent in counseling and discussion with patient regarding diagnosis, medications and treatment plan. Care discussed and coordinated with patient and nurse.     CHRIST Hillman [facility]  Hospitalist Service  [facility]  Securely message with the [employer] Web Console (learn more here)  Text page via [employer] Paging/Directory             ______________________________________________________________________    Interval History   Not much change from yesterday.  Patient says she has no physical complaints.  The  is present today but patient is able to speak English and says she does not need .  Patient tells me her appetite has been good and she ate all of her food yesterday.  She has some lower extremity ankle swelling but she says this is  not worse than her usual.  We discussed ways to minimize ankle edema including limiting salt to 2 g/day, fluids to 2 L/day and elevating legs during periods of rest throughout the day.  Patient has had no episodes of hypoglycemia.  Denies fever, chills, chest pain, shortness of breath, nausea.    Review of Systems: 10 point review of systems negative except for pertinent positives mentioned in HPI    Data reviewed today: I reviewed all medications, new labs and imaging results over the last 24 hours. I personally reviewed no images or EKG's today.    Physical Exam   Vital Signs: Temp: 98  F (36.7  C) Temp src: Oral BP: 123/62 Pulse: 76   Resp: 18 SpO2: 99 % O2 Device: None (Room air)    Weight: 113 lbs 4.8 oz  General Appearance: Alert, calm, sitting in the chair with sunglasses on, no apparent distress  HEENT: Normocephalic, atraumatic  Respiratory: Clear bilaterally, nonlabored  Cardiovascular: S1, S2, rhythm rate regular, negative murmur, mild to moderate lower extremity ankle edema  GI: Bowel sounds positive x4, nondistended and nontender  Skin: No visible rashes or bruising  Musculoskeletal: No joint deformity  Neurologic: Alert, speech intact, facial symmetry, moves all extremities equally, sensation intact, no noted tremor     Data   Recent Labs   Lab 11/13/21  1141 11/13/21  0658 11/12/21  1954 11/10/21  1654 11/10/21  0954   WBC  --   --   --   --  6.8   HGB  --   --   --   --  13.1   MCV  --   --   --   --  90   PLT  --   --   --   --  226   NA  --   --   --   --  141   POTASSIUM  --   --   --   --  3.9   CHLORIDE  --   --   --   --  105   CO2  --   --   --   --  30   BUN  --   --   --   --  25   CR  --   --   --   --  0.74   ANIONGAP  --   --   --   --  6   SYLVESTER  --   --   --   --  9.3   * 205* 254*   < > 115   ALBUMIN  --   --   --   --  3.1*   PROTTOTAL  --   --   --   --  6.5   BILITOTAL  --   --   --   --  0.8   ALKPHOS  --   --   --   --  108   ALT  --   --   --   --  20   AST  --   --   --    --  18    < > = values in this interval not displayed.     No results found for this or any previous visit (from the past 24 hour(s)).  Medications       amLODIPine  5 mg Oral Daily     aspirin  81 mg Oral Daily     insulin aspart  1-3 Units Subcutaneous TID AC     insulin glargine  10 Units Subcutaneous At Bedtime     levothyroxine  25 mcg Oral QAM AC     OLANZapine  5 mg Oral At Bedtime     rosuvastatin  5 mg Oral Daily     sitagliptin  100 mg Oral Daily

## 2021-11-13 NOTE — PROGRESS NOTES
[id number]: Woke up during the night c/o insomnia, declined all prn interventioins.Finally went to sleep at 0300 and stayed in bed the rest of the night.Safety maintained

## 2021-11-13 NOTE — PLAN OF CARE
"Problem: Behavioral Health Plan of Care  Goal: Plan of Care Review  Outcome: No Change  Flowsheets  Taken 11/13/2021 1056  Progress: no change  Taken 11/13/2021 0743  Plan of Care Reviewed With: patient  Patient Agreement with Plan of Care: agrees  Goal: Adheres to Safety Considerations for Self and Others  Outcome: Improving  Intervention: Develop and Maintain Individualized Safety Plan  Recent Flowsheet Documentation  Taken 11/13/2021 0743 by Gita Reardon RN  Safety Measures:    environmental rounds completed    safety rounds completed  Goal: Absence of New-Onset Illness or Injury  Intervention: Identify and Manage Fall Risk  Recent Flowsheet Documentation  Taken 11/13/2021 0743 by Gita Reardon RN  Safety Measures:    environmental rounds completed    safety rounds completed  Goal: Develops/Participates in Therapeutic [place] to Support Successful Transition  Outcome: Improving  Intervention: Foster Therapeutic [place]  Recent Flowsheet Documentation  Taken 11/13/2021 0743 by Gita Reardon RN  Trust Relationship/Rapport:    care explained    choices provided    questions answered    reassurance provided   Patient was out in the milieu engaged and attended groups.  Patient endorsed anxiety 8/10, denied all other psych symptoms and somewhat contracted for safety.    Patient stated, \" something is bothering me here.\"  Patient could not say what exactly was bothering her.  When writer administered medications, patient complained, \" I feel something in my throat, where did this water come from?\"  Patient was hesitant to take her medications, as she questioned what they were, why did she have to take them, something not right with the water and why there was so many meds.  Patient agreed to take her medications after her questions were answered.  Patient also mentioned that she tries to avoid watching the TV, because, \" I hear messages on the TV.\"   Patient is mask compliant and uses sunglasses while out " in the milieu.  Patient has blood sugar checks and receives sliding scale insulin as needed.  Ancillary staff informed writer, that although patient is independent with cares, patient did not want to be left alone in the shower room and she did not want the door to be closed.

## 2021-11-13 NOTE — PLAN OF CARE
BEHAVIORAL TEAM DISCUSSION    Participants:   -Dr Moy OSUZA  - Brielle PRETTY      RN  - Zaida NEWELL        OT  -Porsha KITCHEN OT Student  -Claudia MCDONALD PharmD    Progress: Improving- starting medications   Anticipated length of stay: 7-10 days  Continued Stay Criteria/Rationale: Symptom stabilization - remains paranoid  Medical/Physical:   Diabetes mellitus hypothyroidism hypothyroidism  Precautions:   Behavioral Orders   Procedures     Code 1 - Restrict to Unit     Routine Programming     As clinically indicated     Status 15     Every 15 minutes.     Plan: Return to brother in law/sisters home with psychiatry  Rationale for change in precautions or plan: No changes

## 2021-11-13 NOTE — PROGRESS NOTES
Pt expressed hopelessness about the future and her illness. Pt had difficulty initiating and following direction when doing collage activity. Pt was more interested in looking through a magazine. Pt was preoccupied with wanting to go back home and staying with her sister. Pt became frustrated when her sister did not come at the time she was scheduled to visit and abruptly left group activity to speak with nursing staff.      11/13/21 1450   Engagement   Intervention Group   Topic Detail [place] scavenger hunt collages for symptom management, focus, concentration, socialization, wellness strategy, healthy distraction, and insight development.   Attendance Attended   Patient Response Was respectful;Contributes to conversation;Positive attitude   Concentrated on Task duration of group   Cognition Preoccupied   Mood/Affect Congruent   Social/Behavioral Engaged   Supervision   Supervision Direct supervision provided

## 2021-11-13 NOTE — PROGRESS NOTES
Pt refuses her Zyprexa. She states that she takes her Zyprexa only  under her tangue without water (ODT). She claims that the pill the writer gives her is not Zyprexa.  Pt is upset when the writer approaches her with an  and claiming that she does not need , she can speak english. She denies pain and all psych symptoms. She has bathroom privilege. She denies having any complaint. She has been visible on the unit with little interaction with peers. The writer is continuing to monitor and assist pt as needed.

## 2021-11-13 NOTE — PROVIDER NOTIFICATION
11/13/21 1000   Engagement   Intervention Group   Topic Self-worth/hopefulness   Topic Detail SW Process Group   Attendance Attended   Patient Response Was respectful   Concentrated on Task duration of group   Cognition Goal-directed   Mood/Affect Pleasant   Social/Behavioral Cooperative   Thought Content Reality oriented     GROUP LENGTH (MINS):   20   RELEVANT PRIMARY DIAGNOSIS: Patient Active Problem List   Diagnosis     Psychosis (H)     Paranoid schizophrenia, chronic condition with acute exacerbation (H)     Type 2 diabetes mellitus without complication, with long-term current use of insulin (H)     Acquired hypothyroidism     Hyperlipidemia LDL goal <130     Personal history of noncompliance with medical treatment, presenting hazards to health        TREATMENT MODALITY:      []CBT       []DBT       []ACT       []Interpersonal psychotherapy                                 []Psychoeducational therapy       [x]Skill development       []Other:        ATTENDANCE:        [x]Stayed for entire group     []Arrived late    [] Left early       # OF PATIENTS IN GROUP: 4   BILLABLE:     []Yes            [x]No   Notes:   Group ended early due to a peer's behaviors.

## 2021-11-14 LAB
GLUCOSE BLDC GLUCOMTR-MCNC: 191 MG/DL (ref 70–99)
GLUCOSE BLDC GLUCOMTR-MCNC: 193 MG/DL (ref 70–99)
GLUCOSE BLDC GLUCOMTR-MCNC: 282 MG/DL (ref 70–99)
GLUCOSE BLDC GLUCOMTR-MCNC: 310 MG/DL (ref 70–99)

## 2021-11-14 PROCEDURE — 250N000013 HC RX MED GY IP 250 OP 250 PS 637: Performed by: NURSE PRACTITIONER

## 2021-11-14 PROCEDURE — 128N000001 HC R&B CD/MH ADULT

## 2021-11-14 PROCEDURE — 250N000013 HC RX MED GY IP 250 OP 250 PS 637: Performed by: ANESTHESIOLOGY

## 2021-11-14 RX ORDER — OLANZAPINE 5 MG/1
5 TABLET, ORALLY DISINTEGRATING ORAL AT BEDTIME
Status: DISCONTINUED | OUTPATIENT
Start: 2021-11-14 | End: 2021-11-15

## 2021-11-14 RX ADMIN — AMLODIPINE BESYLATE 5 MG: 5 TABLET ORAL at 08:05

## 2021-11-14 RX ADMIN — INSULIN ASPART 1 UNITS: 100 INJECTION, SOLUTION INTRAVENOUS; SUBCUTANEOUS at 16:49

## 2021-11-14 RX ADMIN — ASPIRIN 81 MG: 81 TABLET, COATED ORAL at 08:05

## 2021-11-14 RX ADMIN — ROSUVASTATIN CALCIUM 5 MG: 5 TABLET, FILM COATED ORAL at 08:04

## 2021-11-14 RX ADMIN — SITAGLIPTIN 100 MG: 25 TABLET, FILM COATED ORAL at 08:04

## 2021-11-14 RX ADMIN — INSULIN ASPART 1 UNITS: 100 INJECTION, SOLUTION INTRAVENOUS; SUBCUTANEOUS at 08:03

## 2021-11-14 RX ADMIN — LEVOTHYROXINE SODIUM 25 MCG: 25 TABLET ORAL at 08:05

## 2021-11-14 RX ADMIN — INSULIN ASPART 2 UNITS: 100 INJECTION, SOLUTION INTRAVENOUS; SUBCUTANEOUS at 11:44

## 2021-11-14 RX ADMIN — OLANZAPINE 5 MG: 5 TABLET, ORALLY DISINTEGRATING ORAL at 20:44

## 2021-11-14 ASSESSMENT — ACTIVITIES OF DAILY LIVING (ADL)
DRESS: SCRUBS (BEHAVIORAL HEALTH)
HYGIENE/GROOMING: HANDWASHING;INDEPENDENT
ORAL_HYGIENE: INDEPENDENT

## 2021-11-14 NOTE — PROGRESS NOTES
[facility]    Medicine Progress Note - Hospitalist Service       Date of Admission:  11/9/2021    Brief Summary: Linnette Monsivais is a 71 year old Macanese speaking female who presented to [facility] emergency department on 11/9/2021 for psychiatric evaluation.  Brought to emergency department by brother-in-law who reports increased erratic and paranoid behaviors.  Recently moved from [place] 11/8/2021.  History of paranoid schizophrenia, hypothyroidism, diabetes mellitus and hyperlipidemia.  Admitted to inpatient behavioral health unit at [facility] on 11/9/2021 for further manage of psychiatric illness.  Hospital medicine consult for medical management, new admission.    Assessment & Plan                #Medication noncompliance  -Reported that patient has not taken her medicines since March 2021.  TSH is 69.88 and hemoglobin A1c is 9.3.   -Medication reconciliation completed.   Given that she had stopped her medications for several months I am starting patient on lower dose of levothyroxine. Titrate diabetic medications to avoid hypoglycemia.      #Essential hypertension  -Blood pressure improved after restarting amlodipine; 123/62.  -Continue amlodipine 5 mg daily  -Patient does have some mild to moderate lower extremity ankle edema.  Patient recently moved from [place] and needs to establish care with a new primary care provider.  If worsening edema could consider discontinuing amlodipine for alternative agent as amlodipine can contribute to lower extremity edema.     #Type 2 diabetes  -Hemoglobin A1c 9.3%.  Patient reports previous A1c was 7  -Prior to admission medications Januvia 100 mg daily, Trulucity weekly injection, insulin glargine 28 units daily and ertugiflozin daily. I received more information from patient today 11/14. She says the Trulucity was started by a doctor she saw in [place] early October. She never took this medicine because the doctor did not  explain what it was for. She also remembers getting rx for ertugiflozin but only took this for a short time and stopped it. Patient says she has been taking Januvia and Lantus. At one time she was on 42 units of Lantus but was having low blood sugars so it was decreased to 20 units, and eventually increased to 28 units  - Blood sugars have been in the 200s. Will discontinue Trulucity and ertugiflozin from her medication list. Increase Lantus to 15 units daily and increase as needed. Continue Januvia.   -Continue diabetic order set initiated with Accu-Cheks 4 times a day, insulin aspart sliding scale with meals while in hospital and hypoglycemic protocol  -Blood sugars have been 295, 193, 205, 254. Receiving on average 1 to 3 units of insulin aspart insulin with meals.     #Hypothyroidism  -Patient reported previously being on levothyroxine.  TSH is high at admission 69.88  -Since she is likely been off her medicine for several months. Levothyroxine restarted at 25 mcg daily. Check TSH with free T4 in 4 to 6 weeks     #Hyperlipidemia  -Patient reporting she has been on a statin in the past.  Currently total cholesterol 174 with LDL 80  -PTA crestor has been restarted.     #Paranoid schizophrenia  -Psychiatry managing       Diet: Very High Consistent Carb (90 g CHO per Meal) Diet    DVT Prophylaxis: Low Risk/Ambulatory with no VTE prophylaxis indicated  Colmenares Catheter: Not present  Central Lines: None  Code Status: Full Code      Disposition Plan   Expected discharge:   per psychiatry     Total floor/unit time spent was 20 minutes in reviewing the record, medications, lab results and completing documentation. 11 minutes spent in counseling and discussion with patient regarding diagnosis, medications and treatment plan. Care discussed and coordinated with patient and nurse.     CHRIST Hillman [facility]  Hospitalist Service  [facility]  Securely message with the Vocera Web Console (learn more  "here)  Text page via [facility] Paging/Directory                 ______________________________________________________________________    Interval History   Nursing reporting that patient did not sleep lastnight. She was paranoid and worried that \"people were coming to get her.\" This morning she refused her amlodipine and wanted a doctor to explain to her why she was on it. We discussed that this was a prior to admission medication. She attributes her high blood pressure to anxiety, \"it comes from my lower body and works it's way up, I'm scared.\" She did take the amlodipine after we discussed it. We talked about her diabetes in detail (see assessment and plan) and will continue to increase Lantus if needed, and continue Januvia. Denies fever, chills, lightheadedness, shortness of breath, chest pain or nausea. Eating and drinking well.     Review of Systems: 10 point review of systems negative except for pertinent positives mentioned in HPI     Data reviewed today: I reviewed all medications, new labs and imaging results over the last 24 hours. I personally reviewed no images or EKG's today.    Physical Exam   Vital Signs: Temp: 97.8  F (36.6  C) Temp src: Oral BP: 124/60 Pulse: 75   Resp: 16 SpO2: 99 % O2 Device: None (Room air)    Weight: 113 lbs 4.8 oz  General Appearance: Alert, calm, sitting in the chair with sunglasses on, no apparent distress  HEENT: Normocephalic, atraumatic  Respiratory: Clear bilaterally, nonlabored  Cardiovascular: S1, S2, rhythm rate regular, negative murmur, mild to moderate lower extremity ankle edema  GI: Bowel sounds positive x4, nondistended and nontender  Skin: No visible rashes or bruising  Musculoskeletal: No joint deformity  Neurologic: Alert, speech intact, facial symmetry, moves all extremities equally, sensation intact, no noted tremor     Data   Recent Labs   Lab 11/14/21  0756 11/13/21 2006 11/13/21  1650 11/10/21  1654 11/10/21  0954   WBC  --   --   --   --  6.8   HGB  --   " --   --   --  13.1   MCV  --   --   --   --  90   PLT  --   --   --   --  226   NA  --   --   --   --  141   POTASSIUM  --   --   --   --  3.9   CHLORIDE  --   --   --   --  105   CO2  --   --   --   --  30   BUN  --   --   --   --  25   CR  --   --   --   --  0.74   ANIONGAP  --   --   --   --  6   SYLVESTER  --   --   --   --  9.3   * 295* 230*   < > 115   ALBUMIN  --   --   --   --  3.1*   PROTTOTAL  --   --   --   --  6.5   BILITOTAL  --   --   --   --  0.8   ALKPHOS  --   --   --   --  108   ALT  --   --   --   --  20   AST  --   --   --   --  18    < > = values in this interval not displayed.     No results found for this or any previous visit (from the past 24 hour(s)).  Medications       amLODIPine  5 mg Oral Daily     aspirin  81 mg Oral Daily     insulin aspart  1-3 Units Subcutaneous TID AC     insulin glargine  15 Units Subcutaneous At Bedtime     levothyroxine  25 mcg Oral QAM AC     OLANZapine  5 mg Oral At Bedtime     rosuvastatin  5 mg Oral Daily     sitagliptin  100 mg Oral Daily

## 2021-11-14 NOTE — PLAN OF CARE
Problem: Behavioral Health Plan of Care  Goal: Adheres to Safety Considerations for Self and Others  Outcome: Improving  Goal: Optimized Coping Skills in Response to Life Stressors  Outcome: Improving   Pt. Out in the milieu engaged with staff and peers, Pt. Initially refused BP med, mark Allen N.P. visited with her. 12:00 noon BS - 282, Endorses anxiety 7/10, depression 0, denies pain and all other psych symptoms. Mood calm and pleasant.  Pt. Contracts for safety.

## 2021-11-14 NOTE — PLAN OF CARE
Problem: Behavioral Health Plan of Care  Goal: Absence of New-Onset Illness or Injury  Outcome: Improving     Problem: Cognitive Impairment (Psychotic Signs/Symptoms)  Goal: Optimal Cognitive Function (Psychotic Signs/Symptoms)  Outcome: Improving     Problem: Suicidal Behavior  Goal: Suicidal Behavior is Absent or Managed  Outcome: Improving

## 2021-11-14 NOTE — PROGRESS NOTES
Patient did not sleep at all this night. Kept coming out of her room, c/o insomnia, but declined all prn intervention.Easily redirectable and pleasant on approach.

## 2021-11-14 NOTE — PROGRESS NOTES
Pt was pleasant on approach with blunted affect. Pt endorsed anxiety 8/10, but declined any pharmacological intervention, denied depression and all other psych symptoms. Pt denied SI and HI. Pt refused to take HS Zyprexa because it was not ODT.     Pt was visible on the unit and spent majority of this shift laying in a recliner. Pt reported that she was feeling that someone was trying to get her, but she could not say who this person was.

## 2021-11-15 LAB
GLUCOSE BLDC GLUCOMTR-MCNC: 105 MG/DL (ref 70–99)
GLUCOSE BLDC GLUCOMTR-MCNC: 183 MG/DL (ref 70–99)
GLUCOSE BLDC GLUCOMTR-MCNC: 185 MG/DL (ref 70–99)
GLUCOSE BLDC GLUCOMTR-MCNC: 246 MG/DL (ref 70–99)

## 2021-11-15 PROCEDURE — 99233 SBSQ HOSP IP/OBS HIGH 50: CPT | Performed by: PSYCHIATRY & NEUROLOGY

## 2021-11-15 PROCEDURE — 250N000013 HC RX MED GY IP 250 OP 250 PS 637: Performed by: NURSE PRACTITIONER

## 2021-11-15 PROCEDURE — 250N000013 HC RX MED GY IP 250 OP 250 PS 637: Performed by: PSYCHIATRY & NEUROLOGY

## 2021-11-15 PROCEDURE — 128N000001 HC R&B CD/MH ADULT

## 2021-11-15 RX ORDER — CARBOXYMETHYLCELLULOSE SODIUM 5 MG/ML
1 SOLUTION/ DROPS OPHTHALMIC 4 TIMES DAILY PRN
Status: DISCONTINUED | OUTPATIENT
Start: 2021-11-15 | End: 2021-12-02 | Stop reason: HOSPADM

## 2021-11-15 RX ADMIN — INSULIN ASPART 1 UNITS: 100 INJECTION, SOLUTION INTRAVENOUS; SUBCUTANEOUS at 11:46

## 2021-11-15 RX ADMIN — OLANZAPINE 5 MG: 5 TABLET, ORALLY DISINTEGRATING ORAL at 20:37

## 2021-11-15 RX ADMIN — ASPIRIN 81 MG: 81 TABLET, COATED ORAL at 08:10

## 2021-11-15 RX ADMIN — SITAGLIPTIN 100 MG: 25 TABLET, FILM COATED ORAL at 08:10

## 2021-11-15 RX ADMIN — INSULIN ASPART 1 UNITS: 100 INJECTION, SOLUTION INTRAVENOUS; SUBCUTANEOUS at 16:46

## 2021-11-15 RX ADMIN — ROSUVASTATIN CALCIUM 5 MG: 5 TABLET, FILM COATED ORAL at 08:10

## 2021-11-15 RX ADMIN — LEVOTHYROXINE SODIUM 25 MCG: 25 TABLET ORAL at 08:10

## 2021-11-15 ASSESSMENT — ACTIVITIES OF DAILY LIVING (ADL)
DRESS: INDEPENDENT
ORAL_HYGIENE: INDEPENDENT
ORAL_HYGIENE: INDEPENDENT
HYGIENE/GROOMING: INDEPENDENT
HYGIENE/GROOMING: INDEPENDENT;HANDWASHING
DRESS: WITH ASSISTANCE;SCRUBS (BEHAVIORAL HEALTH)

## 2021-11-15 NOTE — PROGRESS NOTES
"PSYCHIATRY  PROGRESS NOTE     DATE OF SERVICE   11/15/2021        CHIEF COMPLAINT   \"I know someone is going to try to kill me.\"       SUBJECTIVE   Nursing reports: Patient has continued to refuse to use the .  She has been tierra for safety but continues to verbalize paranoid ideations.  Patient is afraid of sustaining her room and has been asking the nurses to leave the door open and the lights on.    Patient has been discussed with the  earlier today during team meeting.  Assessment/Intervention/Current Symtoms and Care Coordination  Writer met with patient to discuss discharge. Patient expressed that she is paranoid about someone trying to kill her and her family at home. Patient showed writer cut on her forehead that had been received prior to admission. Patient also showed writer a isabella on her cheek from the mask with no wound apparent. She pulled her pillow case over and a fresh blood spot was on sheets. Patient stated that this was evidence that someone was trying to kill her. Patient processed her diagnosis and has awareness that she is paranoid. She was able  To verbalize that she is willing to work with psychiatry with medication adjustments.  Patient is in agreement with psychiatry in [place] when she discharges. Patient declined to sign an DANNIE today however stated that she will revisit the topic when she feels better.       OBJECTIVE   Patient was seen and evaluated in the day area by herself, this was a face-to-face evaluation.  Patient was interviewed in Turkmen by this writer.  Patient expressed that she has been feeling paranoid thinking that someone is going to come in the unit and shoot her.  The patient is nonspecific and never tells me who is this person that is going to, and kill her.  Patient said that she does not feel safe in the unit but at the same time tells me that she is not leaving the hospital.  Patient is also telling me that she will refuse an increase " on the Zyprexa because for 10 years she only took Zyprexa 5 mg at bedtime.  She is also telling me that she has been awake all night and the TV is sending her messages.  Patient also thinks that someone is sending her messages through the food packaging.  She is caring a little pack of hot sauce and according to the patient someone wrote a threatening message there.    I discussed with the patient that at this time I think an increase on the Zyprexa is going to benefit her and it will be important for her to try this.  Patient stated that if I increase the Zyprexa she is just going to stop taking the medication.  She also repeated that if I discharge her from the hospital she is going to be doing the same that she was doing at home before coming here.  She also insisted on this writer to talk to her brother-in-law.  I informed the patient that I was going to call the brother-in-law and give him an update about how she is doing.    I called brother-in-law and left a message.     MEDICATIONS   Medications:  Scheduled Meds:    amLODIPine  5 mg Oral Daily     aspirin  81 mg Oral Daily     insulin aspart  1-3 Units Subcutaneous TID AC     insulin glargine  15 Units Subcutaneous At Bedtime     levothyroxine  25 mcg Oral QAM AC     OLANZapine  7.5 mg Oral At Bedtime     rosuvastatin  5 mg Oral Daily     sitagliptin  100 mg Oral Daily     Continuous Infusions:  PRN Meds:.acetaminophen, alum & mag hydroxide-simethicone, glucose **OR** dextrose **OR** glucagon, gabapentin, nicotine, OLANZapine **OR** OLANZapine, polyethylene glycol, traZODone    Medication adherence issues: MS Med Adherence Y/N: Yes, Hospitalization  Medication side effects: MEDICATION SIDE EFFECTS: no side effects reported  Benefit: Yes / No: Yes       ROS   A comprehensive review of systems was negative.       MENTAL STATUS EXAM   Vitals: /61   Pulse 69   Temp 98.1  F (36.7  C)   Resp 18   Wt 51.4 kg (113 lb 4.8 oz)   SpO2 99%     Appearance:  No  "apparent distress  Mood: \"I and paranoid\"  Affect: restricted and irritable was congruent to speech  Suicidal Ideation: PRESENT / ABSENT: absent   Homicidal Ideation: PRESENT / ABSENT: absent   Thought process: More linear and goal-directed  Thought content: significant for obsessions  and paranoid ideation.   Fund of Knowledge: Average  Attention/Concentration: Normal  Language ability:  Intact  Memory:  Immediate recall intact, Short-term memory intact and Long-term memory intact  Insight: Limited  Judgement: Limited  Orientation: Yes, x4  Psychomotor Behavior: normal or unremarkable    Muscle Strength and Tone: MuscleStrength: Normal  Gait and Station: Stable on her feet       LABS   personally reviewed.     No results found for: PHENYTOIN, PHENOBARB, VALPROATE, CBMZ       DIAGNOSIS   Principal Problem:    Paranoid schizophrenia, chronic condition with acute exacerbation (H)    Active Problem List:  Patient Active Problem List   Diagnosis     Psychosis (H)     Paranoid schizophrenia, chronic condition with acute exacerbation (H)     Type 2 diabetes mellitus without complication, with long-term current use of insulin (H)     Acquired hypothyroidism     Hyperlipidemia LDL goal <130     Personal history of noncompliance with medical treatment, presenting hazards to health          PLAN   1. Ongoing education given regarding diagnostic and treatment options with risks, benefits and alternatives and adequate verbalization of understanding.  2. Medications       Increase Zyprexa to 7.5 mg at bedtime    3.   coordinating a safe discharge plan with the family.  4.  Medical team currently following the patient.        Risk Assessment: [facility] RISK ASSESSMENT: Patient able to contract for safety    Coordination of Care:   Treatment Plan reviewed and physician signed, Care discussed with Care/Treatment Team Members, Chart reviewed and Patient seen      Re-Certification I certify that the inpatient psychiatric " facility services furnished since the previous certification were, and continue to be, medically necessary for, either, treatment which could reasonably be expected to improve the patient s condition or diagnostic study and that the hospital records indicate that the services furnished were, either, intensive treatment services, admission and related services necessary for diagnostic study, or equivalent services.     I certify that the patient continues to need, on a daily basis, active treatment furnished directly by or requiring the supervision of inpatient psychiatric facility personnel.   I estimate 7 days of hospitalization is necessary for proper treatment of the patient. My plans for post-hospital care for this patient are  home with family     Cyndi Hill MD    -     11/15/2021  -     3:31 PM    Total time 35 minutes with > 50%spent on coordination of cares and psycho-education.     This note was created with help of Dragon dictation system. Grammatical / typing errors are not intentional.    Cyndi Hill MD

## 2021-11-15 NOTE — PLAN OF CARE
Assessment/Intervention/Current Symtoms and Care Coordination  Writer met with patient to discuss discharge. Patient expressed that she is paranoid about someone trying to kill her and her family at home. Patient showed writer cut on her forehead that had been received prior to admission. Patient also showed writer a isabella on her cheek from the mask with no wound apparent. She pulled her pillow case over and a fresh blood spot was on sheets. Patient stated that this was evidence that someone was trying to kill her. Patient processed her diagnosis and has awareness that she is paranoid. She was able  To verbalize that she is willing to work with psychiatry with medication adjustments.  Patient is in agreement with psychiatry in [place] when she discharges. Patient declined to sign an DANNIE today however stated that she will revisit the topic when she feels better.          Discharge Plan or Goal Return to brother in law/sister's home, [facility] and psychiatry.      Barriers to Discharge Symptom stabilization medication management and care coordination    Referral Status pending psychiatry [facility]    Pt Contacts:   brother in-law, Sarbjit Arriaza [phone]    Legal Status Voluntary      Zaida Crane MA [facility], 11/12/2021, 2:44 PM

## 2021-11-15 NOTE — PROGRESS NOTES
[id number]:Patient had a restful night and slept for greater than 6 hours this shift.Requested for the door to be left opened and the lights to be kept on all night.Pt is currently still sleeping. Safety maintained.

## 2021-11-15 NOTE — PROGRESS NOTES
11/15/21 1521   Engagement   Intervention Group   Topic Detail Fleece tie pillow activity for following directions, sequencing, relaxation, and healthy leisure exploration   Attendance Attended   Patient Response Needs reinforcement/repetition to learn materials;Was respectful;Expressed feelings/issues;Contributes to conversation  (Pt shared that she had cut on her finger and couldn't use the scissors (per observation the cut wouldn't interfere with using the scissors), but with encouragement was able to use the scissors. She required a verbal cue on where to place tape and where to isabella the tape.)   Concentrated on Task duration of group   Cognition Goal-directed;Sequences task;Attends to detail   Mood/Affect Pleasant   Social/Behavioral Engaged;Cooperative   Thought Content [place]   Supervision   Supervision On-site supervision provided

## 2021-11-15 NOTE — PLAN OF CARE
" was present for the assessment, but patient answered all of the questions in English. She answered them before the  translater them into Yakut.  15-minute safety checks in progress, as per unit policy. Contracted for safety. Endorse anxiety, but said it wasn't that bad in the  afternoon, it gets worse at night. She said \"I am worried that someone is trying to kill me at night,  that's why I don't like to sleep at night.\" Offered patient sleeping aids, but she declined, saying \"I need to stay aware at night, that's why I sleep during the day.\" She endorsed hearing voices through the TV saying \"Tonight is the night\".   Patient requested the disintegrating Zyprexa (ODT), so her bedtime dose was changed to that from the oral Zyprexa. Med-compliant.   Patient fel asleep in the recliner in the lounge after snack.   Problem: Behavior Regulation Impairment (Psychotic Signs/Symptoms)  Goal: Improved Behavioral Control (Psychotic Signs/Symptoms)  Outcome: No Change     Problem: Sensory Perception Impairment (Psychotic Signs/Symptoms)  Goal: Decreased Sensory Symptoms (Psychotic Signs/Symptoms)  Outcome: No Change     Problem: Sleep Disturbance (Psychotic Signs/Symptoms)  Goal: Improved Sleep (Psychotic Signs/Symptoms)  Outcome: No Change     Problem: Behavioral Health Plan of Care  Goal: Plan of Care Review  Outcome: Improving  Goal: Absence of New-Onset Illness or Injury  Outcome: Improving     Problem: Suicidal Behavior  Goal: Suicidal Behavior is Absent or Managed  Outcome: Improving     Problem: Behavioral Health Plan of Care  Goal: Patient-Specific Goal (Individualization)  Note: S     "

## 2021-11-15 NOTE — CONSULTS
Full consult done on 11/11.  Today the concern is ordering meter supplies. Since we don't know what meter she has or its working order I suggest ordering what is covered by her insurance.      Discharge needs:  Accu chek meter kit  2 Accuchek Guide strips, 2 boxes of 50  3SoftClix lancets, 1 box  To test BG twice daily    Manasa Clark RN, Certified Diabetes Care and   VM: [phone]/Pager: [phone]

## 2021-11-15 NOTE — PROGRESS NOTES
11/15/21 1040   Engagement   Intervention Group   Topic Detail Circuit exercises and pet therapy for wellbeing, relaxation, and coping with sx through distraction   Attendance Did not attend   Reason for Not Attending Refused  (Pt politely refused reporting that she didn't feel good)   Supervision   Supervision On-site supervision provided

## 2021-11-15 NOTE — PLAN OF CARE
BEHAVIORAL TEAM DISCUSSION    Participants:   -Dr Moy SOUZA  - Macy MIX     RN  - Zaida SANCHEZ  - Samreen ESQUIVEL       OT  -Porsha IKTCHEN OT Student  - Flor TOLEDO PharmD    Progress: Improving- starting medications   Anticipated length of stay: 7-10 days  Continued Stay Criteria/Rationale: Symptom stabilization - remains paranoid  Medical/Physical:   Diabetes mellitus hypothyroidism hypothyroidism  Precautions:   Behavioral Orders   Procedures    Code 1 - Restrict to Unit    Routine Programming     As clinically indicated    Status 15     Every 15 minutes.     Plan: Return to brother in law/sisters home with psychiatry  Rationale for change in precautions or plan: No changes

## 2021-11-15 NOTE — PROGRESS NOTES
"[facility]    Medicine Progress Note - Hospitalist Service       Date of Admission:  11/9/2021  Brief Summary: Linnette Monsivais is a 71 year old Dutch speaking female who presented to [facility] emergency department on 11/9/2021 for psychiatric evaluation.  Brought to emergency department by brother-in-law who reports increased erratic and paranoid behaviors.  Recently moved from [place] 11/8/2021.  History of paranoid schizophrenia, hypothyroidism, diabetes mellitus and hyperlipidemia.  Admitted to inpatient behavioral health unit at [facility] on 11/9/2021 for further manage of psychiatric illness.  Hospital medicine consult for medical management, new admission.    Assessment & Plan                   #Medication noncompliance  -Reported that patient has not taken her medicines since March 2021.  TSH is 69.88 and hemoglobin A1c is 9.3.   -Medication reconciliation completed.   Given that she had stopped her medications for several months I am starting patient on lower dose of levothyroxine. Titrate diabetic medications to avoid hypoglycemia.      #Essential hypertension  -Blood pressure on admission was 194/94, 140s to 150s systolic; improved after restarting amlodipine; 116/61  -Continue amlodipine 5 mg daily.  Patient refused the dose this morning saying she is not familiar with it.  She initially refused it yesterday morning as well but after I explained to her what it was for she did take it.  -Patient does have some mild to moderate lower extremity ankle edema.  Patient recently moved from [place] and needs to establish care with a new primary care provider.    -We'll continue the amlodipine for now.  Some of her hypertension may be related to anxiety as she reports being \"scared that people are out to get me.\"  If patient continues to refuse amlodipine could consider stopping it, monitoring blood pressures and or switching to alternative agent.     #Type 2 " diabetes  -Hemoglobin A1c 9.3%.  Patient reports previous A1c was 7  -Prior to admission medications Januvia 100 mg daily, Trulucity weekly injection, insulin glargine 28 units daily and ertugiflozin daily. I received more information from patient today 11/14. She says the Trulucity was started by a doctor she saw in [place] early October. She never took this medicine because the doctor did not explain what it was for. She also remembers getting rx for ertugiflozin but only took this for a short time and stopped it. Patient says she has been taking Januvia and Lantus. At one time she was on 42 units of Lantus but was having low blood sugars so it was decreased to 20 units, and eventually increased to 28 units  - Blood sugars have been in the 200s. Will discontinue Trulucity and ertugiflozin from her medication list. Increase Lantus to 15 units daily and increase as needed. Continue Januvia.   -Continue diabetic order set initiated with Accu-Cheks 4 times a day, insulin aspart sliding scale with meals while in hospital and hypoglycemic protocol  -Blood sugars have been 105, 310, 182, 191     #Hypothyroidism  -Patient reported previously being on levothyroxine.  TSH is high at admission 69.88  -Since she is likely been off her medicine for several months. Levothyroxine restarted at 25 mcg daily. Check TSH with free T4 in 4 to 6 weeks     #Hyperlipidemia  -Patient reporting she has been on a statin in the past.  Currently total cholesterol 174 with LDL 80  -PTA crestor has been restarted.     #Paranoid schizophrenia  -Psychiatry managing     Diet: Very High Consistent Carb (90 g CHO per Meal) Diet    DVT Prophylaxis: Low Risk/Ambulatory with no VTE prophylaxis indicated  Colmenares Catheter: Not present  Central Lines: None  Code Status: Full Code      Disposition Plan   Expected discharge:  per psychiatry    Total floor/unit time spent was 25 minutes in reviewing the record, medications, lab results and completing  "documentation. 15 minutes spent in counseling and discussion with patient regarding diagnosis, medications and treatment plan. Care discussed and coordinated with patient and nurse.     CHRIST Hillman [facility]  Hospitalist Service  [facility]  Securely message with the Vocera Web Console (learn more here)  Text page via [facility] Paging/Directory               ______________________________________________________________________    Interval History   Nurse reporting that patient refused her amlodipine today stating \"I don't know what this white pill is.\"  Similarly, yesterday she refused the amlodipine and did take it after I explained to her what it was for.  I have asked the nurse to reapproach patient in 1 hour, explaining that it is for her blood pressure and see if she will retake it.  Patient tells me \"I am scared of the people in here that there been a get me, and I think they are waiting to get me outside.\"  Patient has a crease in her neck fold that appears to be from the sheets or pillow that she slept on.  Patient thinks this neck crease is from someone taking a utensil and pushing it along her skin.  He has no neck swelling or open sores.  No other complaints.  Denies fever, shortness of breath, chest pain, nausea or constipation.    Review of Systems: 10 point review of systems negative except for pertinent positives mentioned in HPI    Data reviewed today: I reviewed all medications, new labs and imaging results over the last 24 hours. I personally reviewed no images or EKG's today.    Physical Exam   Vital Signs: Temp: 98.1  F (36.7  C) Temp src: Oral BP: 116/61 Pulse: 69   Resp: 18 SpO2: 99 %      Weight: 113 lbs 4.8 oz  General Appearance: Alert, calm, sitting in the chair with sunglasses on, no apparent distress  HEENT: Normocephalic, atraumatic  Respiratory: Clear bilaterally, nonlabored  Cardiovascular: S1, S2, rhythm rate regular, negative murmur, mild to moderate lower " extremity ankle edema  GI: Bowel sounds positive x4, nondistended and nontender  Skin: No visible rashes or bruising  Musculoskeletal: No joint deformity  Neurologic: Alert, speech intact, facial symmetry, moves all extremities equally, sensation intact, no noted tremor     Data   Recent Labs   Lab 11/15/21  1110 11/15/21  0651 11/14/21  2010 11/10/21  1654 11/10/21  0954   WBC  --   --   --   --  6.8   HGB  --   --   --   --  13.1   MCV  --   --   --   --  90   PLT  --   --   --   --  226   NA  --   --   --   --  141   POTASSIUM  --   --   --   --  3.9   CHLORIDE  --   --   --   --  105   CO2  --   --   --   --  30   BUN  --   --   --   --  25   CR  --   --   --   --  0.74   ANIONGAP  --   --   --   --  6   SYLVESTER  --   --   --   --  9.3   * 105* 310*   < > 115   ALBUMIN  --   --   --   --  3.1*   PROTTOTAL  --   --   --   --  6.5   BILITOTAL  --   --   --   --  0.8   ALKPHOS  --   --   --   --  108   ALT  --   --   --   --  20   AST  --   --   --   --  18    < > = values in this interval not displayed.     No results found for this or any previous visit (from the past 24 hour(s)).  Medications       amLODIPine  5 mg Oral Daily     aspirin  81 mg Oral Daily     insulin aspart  1-3 Units Subcutaneous TID AC     insulin glargine  15 Units Subcutaneous At Bedtime     levothyroxine  25 mcg Oral QAM AC     OLANZapine  7.5 mg Oral At Bedtime     rosuvastatin  5 mg Oral Daily     sitagliptin  100 mg Oral Daily

## 2021-11-15 NOTE — PLAN OF CARE
"  Problem: Behavioral Health Plan of Care  Goal: Optimized Coping Skills in Response to Life Stressors  Outcome: No Change  Goal: Develops/Participates in Therapeutic [place] to Support Successful Transition  Outcome: No Change  Intervention: Foster Therapeutic [place]  Recent Flowsheet Documentation  Taken 11/15/2021 0900 by Tam Kong RN  Trust Relationship/Rapport:    emotional support provided    empathic listening provided     Problem: Behavior Regulation Impairment (Psychotic Signs/Symptoms)  Goal: Improved Behavioral Control (Psychotic Signs/Symptoms)  Outcome: No Change     Problem: Suicidal Behavior  Goal: Suicidal Behavior is Absent or Managed  Outcome: No Change    Patient initially denied SI, HI, SIB, and contracted safety, but as the shift progressed, patient could be heard telling the  psychiatrist during an assessment meeting that  If she gets out of this door, she will kill her self, if  the psychiatrist increase her medications.\" Patient refused Amlodipine 5mg @ AM and was schooled on the benefits of taking medications. Blood sugar was 183 @ lunch and a unit of novolog administered per sliding scale.  Will continue to encourage and provide care.  "

## 2021-11-16 LAB
GLUCOSE BLDC GLUCOMTR-MCNC: 109 MG/DL (ref 70–99)
GLUCOSE BLDC GLUCOMTR-MCNC: 237 MG/DL (ref 70–99)
GLUCOSE BLDC GLUCOMTR-MCNC: 264 MG/DL (ref 70–99)
GLUCOSE BLDC GLUCOMTR-MCNC: 92 MG/DL (ref 70–99)

## 2021-11-16 PROCEDURE — 99233 SBSQ HOSP IP/OBS HIGH 50: CPT | Performed by: PSYCHIATRY & NEUROLOGY

## 2021-11-16 PROCEDURE — 250N000013 HC RX MED GY IP 250 OP 250 PS 637: Performed by: PSYCHIATRY & NEUROLOGY

## 2021-11-16 PROCEDURE — 128N000001 HC R&B CD/MH ADULT

## 2021-11-16 PROCEDURE — 250N000013 HC RX MED GY IP 250 OP 250 PS 637: Performed by: NURSE PRACTITIONER

## 2021-11-16 RX ADMIN — SITAGLIPTIN 100 MG: 25 TABLET, FILM COATED ORAL at 08:51

## 2021-11-16 RX ADMIN — OLANZAPINE 5 MG: 5 TABLET, ORALLY DISINTEGRATING ORAL at 20:39

## 2021-11-16 RX ADMIN — AMLODIPINE BESYLATE 5 MG: 5 TABLET ORAL at 08:51

## 2021-11-16 RX ADMIN — INSULIN ASPART 1 UNITS: 100 INJECTION, SOLUTION INTRAVENOUS; SUBCUTANEOUS at 16:48

## 2021-11-16 RX ADMIN — LEVOTHYROXINE SODIUM 25 MCG: 25 TABLET ORAL at 08:52

## 2021-11-16 RX ADMIN — ROSUVASTATIN CALCIUM 5 MG: 5 TABLET, FILM COATED ORAL at 08:51

## 2021-11-16 RX ADMIN — CARBOXYMETHYLCELLULOSE SODIUM 1 DROP: 0.5 SOLUTION/ DROPS OPHTHALMIC at 16:42

## 2021-11-16 ASSESSMENT — ACTIVITIES OF DAILY LIVING (ADL)
HYGIENE/GROOMING: INDEPENDENT
ORAL_HYGIENE: INDEPENDENT
DRESS: INDEPENDENT

## 2021-11-16 NOTE — PROGRESS NOTES
11/16/21 1058   Engagement   Intervention Group   Topic Detail Coping through the senses for wellbeing, managing symptoms and stress, and relaxation   Attendance Attended   Patient Response Was respectful;Contributes to conversation;Positive attitude;Demonstrated understanding of materials provided  (Pt answered check-out question sharing a coping strategy she will use is walking.)   Concentrated on Task duration of group   Cognition Goal-directed   Mood/Affect Pleasant   Social/Behavioral Engaged;Cooperative   Thought Content [place]   Goals addressed in session today Pt ID walking as a preferred coping strategy and also reported liking the massage pillow.   Supervision   Supervision On-site supervision provided

## 2021-11-16 NOTE — PROGRESS NOTES
"[facility]    Medicine Progress Note - Hospitalist Service       Date of Admission:  11/9/2021    Brief Summary: Linnette Monsivais is a 71 year old [place] speaking female who presented to [facility] emergency department on 11/9/2021 for psychiatric evaluation.  Brought to emergency department by brother-in-law who reports increased erratic and paranoid behaviors.  Recently moved from [place] 11/8/2021.  History of paranoid schizophrenia, hypothyroidism, diabetes mellitus and hyperlipidemia.  Admitted to inpatient behavioral health unit at [facility] on 11/9/2021 for further manage of psychiatric illness.  Hospital medicine consult for medical management, new admission.    Assessment & Plan         #Medication noncompliance  -Reported that patient has not taken her medicines since March 2021.  TSH is 69.88 and hemoglobin A1c is 9.3.   -Medication reconciliation completed.   Given that she had stopped her medications for several months I am starting patient on lower dose of levothyroxine. Titrate diabetic medications to avoid hypoglycemia.      #Essential hypertension  -Blood pressure on admission was 194/94, 140s to 150s systolic; improved after restarting amlodipine; 116/61  -Continue amlodipine 5 mg daily.    Occasionally tries to refuse amlodipine dose.  I did explain to her what it is being used for and she did take the medicine this morning.  -Patient does have some mild to moderate lower extremity ankle edema.    -We'll continue the amlodipine for now.  Some of her hypertension may be related to anxiety as she reports being \"scared that people are out to get me.\"  If patient continues to refuse amlodipine could consider stopping it, monitoring blood pressures and or switching to alternative agent.     #Type 2 diabetes  -Hemoglobin A1c 9.3%.  Patient reports previous A1c was 7  -Prior to admission medications Januvia 100 mg daily, Trulucity weekly injection, insulin " glargine 28 units daily and ertugiflozin daily. I received more information from patient today 11/14. She says the Trulucity was started by a doctor she saw in [place] early October. She never took this medicine because the doctor did not explain what it was for. She also remembers getting rx for ertugiflozin but only took this for a short time and stopped it. Patient says she has been taking Januvia and Lantus. At one time she was on 42 units of Lantus but was having low blood sugars so it was decreased to 20 units, and eventually increased to 28 units  - Blood sugars have been in the 200s. Will discontinue Trulucity and ertugiflozin from her medication list. Increase Lantus to 15 units daily and increase as needed. Continue Januvia.   -Continue diabetic order set initiated with Accu-Cheks 4 times a day, insulin aspart sliding scale with meals while in hospital and hypoglycemic protocol  -Blood sugars have been stable however last 2 mornings (after increase of Lantus) blood sugar 92, 105. Other bs 183-246.   -I'm decreasing insulin glargine back to 10 units once daily at bedtime to avoid hypoglycemia. If still low in morning, consider changing insulin glargine to morning (patient says she sometimes takes her Lantus in the morning, sometimes at night)     #Hypothyroidism  -Patient reported previously being on levothyroxine.  TSH is high at admission 69.88  -Since she is likely been off her medicine for several months. Levothyroxine restarted at 25 mcg daily. Check TSH with free T4 in 4 to 6 weeks     #Hyperlipidemia  -Patient reporting she has been on a statin in the past.  Currently total cholesterol 174 with LDL 80  -PTA crestor has been restarted     #Paranoid schizophrenia  -Psychiatry managing        Diet: Very High Consistent Carb (90 g CHO per Meal) Diet    DVT Prophylaxis: Low Risk/Ambulatory with no VTE prophylaxis indicated  Colmenares Catheter: Not present  Central Lines: None  Code Status: Full Code       Disposition Plan   Expected discharge:  per psychiatry    Total floor/unit time spent was 25 minutes in reviewing the record, medications, lab results and completing documentation. 15 minutes spent in counseling and discussion with patient regarding diagnosis, medications and treatment plan. Care discussed and coordinated with patient and nurse.     CHRIST Hillman [facility]  Hospitalist Service  [facility]  Securely message with the Vocera Web Console (learn more here)  Text page via [facility] Paging/Directory                 ______________________________________________________________________    Interval History   Patient is up walking [place] [facility] [place]  She said she slept well at least 6 hours last night for the second night in a row.  We discussed her blood sugar of 92 this morning and she is agreeable to decreasing the Lantus dose at night.  She was asymptomatic with this and did not have any dizziness, shakiness or diaphoresis.  No complaints.  Denies fever, cough, shortness of breath, chest pain, nausea, constipation or loose stools.    Review of Systems: 12 point review of systems negative except for pertinent positives mentioned in HPI    Data reviewed today: I reviewed all medications, new labs and imaging results over the last 24 hours. I personally reviewed no images or EKG's today.    Physical Exam   Vital Signs: Temp: 98  F (36.7  C) Temp src: Oral BP: (!) 153/69 Pulse: 81   Resp: 18 SpO2: 99 % O2 Device: None (Room air)    Weight: 118 lbs 3.2 oz  General Appearance: Alert, calm, sitting in the chair with sunglasses on, no apparent distress  HEENT: Normocephalic, atraumatic  Respiratory: Clear bilaterally, nonlabored  Cardiovascular: S1, S2, rhythm rate regular, negative murmur, mild to moderate lower extremity ankle edema  GI: Bowel sounds positive x4, nondistended and nontender  Skin: No visible rashes or bruising  Musculoskeletal: No joint deformity  Neurologic: Alert  and oriented x 3, speech intact, facial symmetry, moves all extremities equally, sensation intact, no noted tremor        Data   Recent Labs   Lab 11/16/21  1153 11/16/21  0640 11/15/21  1942 11/10/21  1654 11/10/21  0954   WBC  --   --   --   --  6.8   HGB  --   --   --   --  13.1   MCV  --   --   --   --  90   PLT  --   --   --   --  226   NA  --   --   --   --  141   POTASSIUM  --   --   --   --  3.9   CHLORIDE  --   --   --   --  105   CO2  --   --   --   --  30   BUN  --   --   --   --  25   CR  --   --   --   --  0.74   ANIONGAP  --   --   --   --  6   SYLVESTER  --   --   --   --  9.3   * 92 246*   < > 115   ALBUMIN  --   --   --   --  3.1*   PROTTOTAL  --   --   --   --  6.5   BILITOTAL  --   --   --   --  0.8   ALKPHOS  --   --   --   --  108   ALT  --   --   --   --  20   AST  --   --   --   --  18    < > = values in this interval not displayed.     No results found for this or any previous visit (from the past 24 hour(s)).  Medications       amLODIPine  5 mg Oral Daily     aspirin  81 mg Oral Daily     insulin aspart  1-3 Units Subcutaneous TID AC     insulin glargine  10 Units Subcutaneous At Bedtime     levothyroxine  25 mcg Oral QAM AC     OLANZapine zydis  7.5 mg Oral At Bedtime     rosuvastatin  5 mg Oral Daily     sitagliptin  100 mg Oral Daily

## 2021-11-16 NOTE — PROGRESS NOTES
"   11/16/21 1526   Engagement   Intervention Group   Topic Detail Fleece tie pillow activity (Day 2) for following directions, sequencing, relaxation, and healthy leisure exploration   Attendance Attended   Patient Response Demonstrated understanding of materials provided;Was respectful;Contributes to conversation;Expressed feelings/issues  (Pt took a break from working on her word search after reporting her eyes hurt. When asked if she wanted the lights to be dimmed pt said no because she \"didn't want to disturb anyone\" and chose to sit and observe.)   Concentrated on Task 30 - 45 min  (Left briefly to speak with Dr. Winter but returned to group)   Cognition Goal-directed;Sequences task   Mood/Affect Pleasant   Social/Behavioral Cooperative;Engaged  (Once pt finished her tie pillow she chose to work on a word search.)   Thought Content [place]   Supervision   Supervision On-site supervision provided     "

## 2021-11-16 NOTE — PROGRESS NOTES
Patient was awake at the start of the shift, then later went to bed with lights on and door left open per her request. Slept for a total of 4hours this night.Currently still sleeping. Safety maintained.Blood sugar 92 this morning.

## 2021-11-16 NOTE — PLAN OF CARE
Assessment/Intervention/Current Symtoms and Care Coordination  Writer met with patient to discuss discharge. Patient requested that she has eye drops from her belongings. Psychiatrist has approved eye drops. Patient requested to wear her personal belongings and has agreed to wait for approval. Patient noted no other concerns at this time. Patient was out on unit and interacting with staff. Patient has refused to adjust medications at this time and remains paranoid.      Writer spoke to brother in law. Brother in law will continue to support and encourage patient to take medications. Writer provided information concerning flight from [place] to [place] on December 3rd. Rosie will cancel flight and reschedule as necessary.      Discharge Plan or Goal Return to brother in law/sister's home, [facility] and psychiatry.      Barriers to Discharge Symptom stabilization medication management and care coordination    Referral Status pending psychiatry Alexi Masterson    Pt Contacts:  Brother in-lawSarbjit [phone]    Legal Status Voluntary      Zaida Crane MA [facility] [place], 11/12/2021, 2:44 PM

## 2021-11-16 NOTE — PLAN OF CARE
"Patient rated anxiety at 8, declined any interventions. Depression rated at 0, denied SI/HI, continues to verbalize paranoia, she thinks someone is watching/following her, someone is out to get her. Visible in milieu, social and interactive. She denied pain or discomfort. C/o dry eye, eye drops ordered but then refused eye drops \"I have some kind of feeling about it\".She also refused 7.5mg of Zyprexa, took only 5mg, she stated that she told the doctor not to increase the medication to 7.5mg. Independent with all ADLs, alert and oriented x3, able to communicate needs.   Problem: Behavioral Health Plan of Care  Goal: Adheres to Safety Considerations for Self and Others  Outcome: Improving  Goal: Absence of New-Onset Illness or Injury  Outcome: Improving  Goal: Optimized Coping Skills in Response to Life Stressors  Outcome: Improving  Goal: Develops/Participates in Therapeutic [place] to Support Successful Transition  Outcome: Improving     Problem: Behavioral Health Plan of Care  Goal: Plan of Care Review  Recent Flowsheet Documentation  Taken 11/15/2021 1800 by Javed Murguia, RN  Plan of Care Reviewed With: patient  Patient Agreement with Plan of Care: agrees     "

## 2021-11-16 NOTE — PLAN OF CARE
Problem: Behavioral Health Plan of Care  Goal: Optimized Coping Skills in Response to Life Stressors  Outcome: Improving  Goal: Develops/Participates in Therapeutic [place] to Support Successful Transition  Outcome: Improving  Intervention: Foster Therapeutic [place]  Recent Flowsheet Documentation  Taken 11/16/2021 1100 by Tam Kong RN  Trust Relationship/Rapport:   questions answered   questions encouraged     Problem: Suicidal Behavior  Goal: Suicidal Behavior is Absent or Managed  Outcome: Improving    Patient has been calm and quiet through the shift. She attended most groups activities and was compliant with medications and ADLs.  However, she did refused Asprin in the AM.  She denied anxiety and depression,  SI, HI, SIB, and contracted for safety. Patient had some concerns regarding his eye droplets and hospitalist consulted with her. No paranoic behavior noted. Patient is more calmer and  cooperative.  Ate 100% breakfast and lunch and was alert and oriented x3. Will continue to monitor and provide care.

## 2021-11-16 NOTE — PHARMACY-ADMISSION MEDICATION HISTORY
Admission Medication history update    Spoke with patient regarding carbidopa/levadopa ER on pta med list. Per patient she never started because she dosen't have parkinson's disease. She stated she changed physicians after this medication was prescribed.     Flor Chopra, [facility],BCCCP, BCCP

## 2021-11-17 LAB
GLUCOSE BLDC GLUCOMTR-MCNC: 123 MG/DL (ref 70–99)
GLUCOSE BLDC GLUCOMTR-MCNC: 161 MG/DL (ref 70–99)
GLUCOSE BLDC GLUCOMTR-MCNC: 179 MG/DL (ref 70–99)
GLUCOSE BLDC GLUCOMTR-MCNC: 241 MG/DL (ref 70–99)

## 2021-11-17 PROCEDURE — 128N000001 HC R&B CD/MH ADULT

## 2021-11-17 PROCEDURE — 250N000013 HC RX MED GY IP 250 OP 250 PS 637: Performed by: PSYCHIATRY & NEUROLOGY

## 2021-11-17 PROCEDURE — 99231 SBSQ HOSP IP/OBS SF/LOW 25: CPT | Performed by: NURSE PRACTITIONER

## 2021-11-17 PROCEDURE — 250N000013 HC RX MED GY IP 250 OP 250 PS 637: Performed by: NURSE PRACTITIONER

## 2021-11-17 PROCEDURE — 99232 SBSQ HOSP IP/OBS MODERATE 35: CPT | Performed by: PSYCHIATRY & NEUROLOGY

## 2021-11-17 RX ADMIN — INSULIN ASPART 1 UNITS: 100 INJECTION, SOLUTION INTRAVENOUS; SUBCUTANEOUS at 17:01

## 2021-11-17 RX ADMIN — CARBOXYMETHYLCELLULOSE SODIUM 1 DROP: 0.5 SOLUTION/ DROPS OPHTHALMIC at 10:53

## 2021-11-17 RX ADMIN — SITAGLIPTIN 100 MG: 25 TABLET, FILM COATED ORAL at 08:08

## 2021-11-17 RX ADMIN — OLANZAPINE 7.5 MG: 5 TABLET, ORALLY DISINTEGRATING ORAL at 20:30

## 2021-11-17 RX ADMIN — ASPIRIN 81 MG: 81 TABLET, COATED ORAL at 08:08

## 2021-11-17 RX ADMIN — INSULIN ASPART 1 UNITS: 100 INJECTION, SOLUTION INTRAVENOUS; SUBCUTANEOUS at 11:28

## 2021-11-17 RX ADMIN — LEVOTHYROXINE SODIUM 25 MCG: 25 TABLET ORAL at 06:48

## 2021-11-17 RX ADMIN — ROSUVASTATIN CALCIUM 5 MG: 5 TABLET, FILM COATED ORAL at 08:08

## 2021-11-17 ASSESSMENT — ACTIVITIES OF DAILY LIVING (ADL)
ORAL_HYGIENE: INDEPENDENT
HYGIENE/GROOMING: HANDWASHING;INDEPENDENT
HYGIENE/GROOMING: INDEPENDENT
DRESS: SCRUBS (BEHAVIORAL HEALTH)
ORAL_HYGIENE: INDEPENDENT
DRESS: SCRUBS (BEHAVIORAL HEALTH);INDEPENDENT

## 2021-11-17 NOTE — PLAN OF CARE
Problem: Behavioral Health Plan of Care  Goal: Adheres to Safety Considerations for Self and Others  Intervention: Develop and Maintain Individualized Safety Plan  Recent Flowsheet Documentation  Taken 11/17/2021 0157 by Taylor Muniz RN  Safety Measures:    safety rounds completed    environmental rounds completed     Problem: Sleep Disturbance (Psychotic Signs/Symptoms)  Goal: Improved Sleep (Psychotic Signs/Symptoms)  Outcome: Improving  Flowsheets (Taken 11/17/2021 9007)  Mutually Determined Action Steps (Improved Sleep): sleeps 4-6 hours at night     Problem: Suicidal Behavior  Goal: Suicidal Behavior is Absent or Managed  Outcome: Improving      Patient was observed sleeping soundly through the night. The room light was on and door let open per patient request. Safety interventions in place per protocol. No untold behaviors noted during this shift. She had 7 hrs of sleep.

## 2021-11-17 NOTE — PROGRESS NOTES
"PSYCHIATRY  PROGRESS NOTE     DATE OF SERVICE   11/17/2021       CHIEF COMPLAINT   \"Someone is still trying to kill me\".        SUBJECTIVE   Nursing reports that patient slept 7 hrs last night. Patient slept with the light on and the door open as requested by her. Patient slept well and no untold behaviors were noted during the shift.      met with patient this morning to discuss discharge.  reports that patient still expresses that people are trying to kill her and her family. SW reports that patient had taken Zyprexa with good results but declines an increase in her dose due to side effect concerns such as \"sleeping all day\".  encouraged patient to try to increase the dose as this has been beneficial in the past. SW spoke to brother in law, he and his wife are supportive of patient remaining in the hospital until she is stable. SW explained commitment process to brother in law and wife. They support commitment process if necessary for medication compliance. Discharge plan or goal is to return to brother in law/sistet's home, [facility] and psychiatry. Barrier to discharge: symptoms stabilization, medication management and care coordination. Pending referral to psychiatry [facility].       OBJECTIVE   Patient was seen and evaluated in the day area. The interview was conducted in Upper sor[place] by this writer (I am bilingual in [place] sor[place] and there was no need for an  during the interview). This was a face-to-face assessment. Patient appeared comfortable with my presence. Patient reported being able to get a good sleep last night. She feels rested and is in a good mood this morning. Patient presented calm but continues to be suspicious and paranoid. When asked about her concerns, patient reported that she still feels that \"someone is trying to kill her and her family\". She reported that for the past couple of days she has been seen messages on the TV. These messages tell " "her that \"she is going to die\". Patient also reported that she is getting messages on salsa packets telling her that \"they are getting angry and mad\" and that \"they are going to kill her\". Patient reported feeling safer here at [facility] than at home prior to being admitted. When asked about medication compliance, patient said that she has been taking her medication, but still refuses to take an increased dose of Zyprexa as ordered by Dr. Winter. Patient noticed last night that one of her credit cards was missing from her purse. She appeared to be very concerned about this. Patient reported that her family tells her that she has hallucinations and that \"nobdy believes her\".         MEDICATIONS   Medications:  Scheduled Meds:    amLODIPine  5 mg Oral Daily     aspirin  81 mg Oral Daily     insulin aspart  1-3 Units Subcutaneous TID AC     insulin glargine  10 Units Subcutaneous At Bedtime     levothyroxine  25 mcg Oral QAM AC     OLANZapine zydis  7.5 mg Oral At Bedtime     rosuvastatin  5 mg Oral Daily     sitagliptin  100 mg Oral Daily     Continuous Infusions:  PRN Meds:.acetaminophen, alum & mag hydroxide-simethicone, artificial tears ophthalmic solution, glucose **OR** dextrose **OR** glucagon, gabapentin, nicotine, OLANZapine **OR** OLANZapine, polyethylene glycol, traZODone    Medication adherence issues: MS Med Adherence Y/N: No  Medication side effects: MEDICATION SIDE EFFECTS: no side effects reported  Benefit: Yes / No: Yes       ROS   A comprehensive review of systems was negative.       MENTAL STATUS EXAM   Vitals: /57   Pulse 75   Temp 97.7  F (36.5  C) (Oral)   Resp 18   Wt 53.6 kg (118 lb 3.2 oz)   SpO2 99%     Appearance:  Patient appears suspicious, wears sunglasses, and is in no apparent distress  Mood: fearful  Affect: appropriate  was congruent to speech  Suicidal Ideation: PRESENT / ABSENT: absent   Homicidal Ideation: PRESENT / ABSENT: absent   Thought process: peserverating on " her delusion   Thought content: obsessions and paranoid ideation  Fund of Knowledge: Average  Attention/Concentration: Normal  Language ability:  Intact  Memory:  Immediate recall intact  Insight:  struggles to maintain insight.  Judgement: limited  Orientation: Yes, x4  Psychomotor Behavior: normal or unremarkable    Muscle Strength and Tone: normal   Gait and Station: Normal       LABS   personally reviewed.     No results found for: PHENYTOIN, PHENOBARB, VALPROATE, CBMZ       DIAGNOSIS   Principal Problem:    Paranoid schizophrenia, chronic condition with acute exacerbation (H)    Active Problem List:  Patient Active Problem List   Diagnosis     Psychosis (H)     Paranoid schizophrenia, chronic condition with acute exacerbation (H)     Type 2 diabetes mellitus without complication, with long-term current use of insulin (H)     Acquired hypothyroidism     Hyperlipidemia LDL goal <130     Personal history of noncompliance with medical treatment, presenting hazards to health          PLAN   1. Ongoing education given regarding diagnostic and treatment options with risks, benefits and alternatives and adequate verbalization of understanding.  2. Zyprexa 7.5 mg at bedtime  3. Medical team currently following the patient.  4. SW coordinating a safe discharge plan with the patient and her family.     Patient has been discussed with the PA student, I agree with her assessment and evaluation of the patient.  Patient has been seen separately in the day area by this writer.    Risk Assessment: [facility] RISK ASSESSMENT: Patient able to contract for safety    Coordination of Care:   Treatment Plan reviewed and physician signed, Care discussed with Care/Treatment Team Members, Chart reviewed and Patient seen      Re-Certification I certify that the inpatient psychiatric facility services furnished since the previous certification were, and continue to be, medically necessary for, either, treatment which could reasonably be  expected to improve the patient s condition or diagnostic study and that the hospital records indicate that the services furnished were, either, intensive treatment services, admission and related services necessary for diagnostic study, or equivalent services.     I certify that the patient continues to need, on a daily basis, active treatment furnished directly by or requiring the supervision of inpatient psychiatric facility personnel.   I estimate 14 days of hospitalization is necessary for proper treatment of the patient. My plans for post-hospital care for this patient are  home with self-care     Teena Mccarthy    -     11/17/2021  -     12:31 PM    Total time  25 minutes with > 50%spent on coordination of cares and psycho-education.    This note was created with help of Dragon dictation system. Grammatical / typing errors are not intentional.    Teena Mccarthy

## 2021-11-17 NOTE — PLAN OF CARE
Problem: Behavioral Health Plan of Care  Goal: Plan of Care Review  Outcome: No Change  Flowsheets  Taken 11/17/2021 0808  Plan of Care Reviewed With: patient  Patient Agreement with Plan of Care: agrees  Taken 11/13/2021 1056  Progress: no change  Note: Patient continues to be paranoid and hesitant to take select medications.  Goal: Adheres to Safety Considerations for Self and Others  Outcome: Improving  Intervention: Develop and Maintain Individualized Safety Plan  Recent Flowsheet Documentation  Taken 11/17/2021 0808 by Gita Reardon RN  Safety Measures:    environmental rounds completed    safety rounds completed  Goal: Absence of New-Onset Illness or Injury  Intervention: Identify and Manage Fall Risk  Recent Flowsheet Documentation  Taken 11/17/2021 0808 by Gita Reardon RN  Safety Measures:    environmental rounds completed    safety rounds completed  Goal: Develops/Participates in Therapeutic [place] to Support Successful Transition  Outcome: Improving  Intervention: Foster Therapeutic [place]  Recent Flowsheet Documentation  Taken 11/17/2021 0808 by Gita Reardon RN  Trust Relationship/Rapport:    care explained    choices provided    questions answered    Patient was engaged, out in the milieu and attended group.  Patient denied all psych symptoms and contracted for safety.  Patient complained of dry eyes, she was administered her PRN eye drops.  Patient is mask compliant and uses sunglasses while out in the milieu.  Patient has blood sugar checks and receives sliding scale insulin as needed.

## 2021-11-17 NOTE — PLAN OF CARE
Problem: Behavioral Health Plan of Care  Goal: Plan of Care Review  Outcome: Improving  Flowsheets (Taken 11/17/2021 1600)  Plan of Care Reviewed With: patient  Patient Agreement with Plan of Care: agrees  Goal: Patient-Specific Goal (Individualization)  Outcome: Improving  Note:     Goal: Adheres to Safety Considerations for Self and Others  Outcome: Improving  Intervention: Develop and Maintain Individualized Safety Plan  Recent Flowsheet Documentation  Taken 11/17/2021 1600 by Shruti Vazquez RN  Safety Measures:    environmental rounds completed    safety rounds completed  Goal: Absence of New-Onset Illness or Injury  Outcome: Improving  Intervention: Identify and Manage Fall Risk  Recent Flowsheet Documentation  Taken 11/17/2021 1600 by Shruti Vazquez RN  Safety Measures:    environmental rounds completed    safety rounds completed  Goal: Optimized Coping Skills in Response to Life Stressors  Outcome: Improving  Goal: Develops/Participates in Therapeutic [place] to Support Successful Transition  Outcome: Improving  Intervention: Foster Therapeutic [place]  Recent Flowsheet Documentation  Taken 11/17/2021 1600 by Shruti Vazquez RN  Trust Relationship/Rapport:    care explained    questions answered    reassurance provided    thoughts/feelings acknowledged     Problem: Behavior Regulation Impairment (Psychotic Signs/Symptoms)  Goal: Improved Behavioral Control (Psychotic Signs/Symptoms)  Outcome: Improving     Problem: Cognitive Impairment (Psychotic Signs/Symptoms)  Goal: Optimal Cognitive Function (Psychotic Signs/Symptoms)  Outcome: Improving  Intervention: Support and Promote Cognitive Ability  Recent Flowsheet Documentation  Taken 11/17/2021 1600 by Shruti Vazquez RN  Trust Relationship/Rapport:    care explained    questions answered    reassurance provided    thoughts/feelings acknowledged     Problem: Decreased Participation and Engagement (Psychotic Signs/Symptoms)  Goal: Increased  Participation and Engagement (Psychotic Signs/Symptoms)  Outcome: Improving  Intervention: Facilitate Participation and Engagement  Recent Flowsheet Documentation  Taken 11/17/2021 1600 by Shruti Vazquez RN  Diversional Activity: television     Problem: Mood Impairment (Psychotic Signs/Symptoms)  Goal: Improved Mood Symptoms (Psychotic Signs/Symptoms)  Outcome: Improving  Intervention: Optimize Emotion and Mood  Recent Flowsheet Documentation  Taken 11/17/2021 1600 by Shruti Vazquez RN  Diversional Activity: television     Problem: Psychomotor Impairment (Psychotic Signs/Symptoms)  Goal: Improved Psychomotor Symptoms (Psychotic Signs/Symptoms)  Outcome: Improving  Intervention: Manage Psychomotor Movement  Recent Flowsheet Documentation  Taken 11/17/2021 1600 by Shruti Vazquez RN  Diversional Activity: television     Problem: Sensory Perception Impairment (Psychotic Signs/Symptoms)  Goal: Decreased Sensory Symptoms (Psychotic Signs/Symptoms)  Outcome: Improving     Problem: Sleep Disturbance (Psychotic Signs/Symptoms)  Goal: Improved Sleep (Psychotic Signs/Symptoms)  Outcome: Improving     Problem: Social, Occupational or Functional Impairment (Psychotic Signs/Symptoms)  Goal: Enhanced Social, Occupational or Functional Skills (Psychotic Signs/Symptoms)  Outcome: Improving  Intervention: Promote Social, Occupational and Functional Ability  Recent Flowsheet Documentation  Taken 11/17/2021 1600 by Shruti Vazquez RN  Trust Relationship/Rapport:    care explained    questions answered    reassurance provided    thoughts/feelings acknowledged     Problem: Sleep Disturbance  Goal: Adequate Sleep/Rest  Outcome: Improving     Problem: Suicidal Behavior  Goal: Suicidal Behavior is Absent or Managed  Outcome: Improving   Pt pleasant, calm and cooperative. Med complaint. Denied anxiety and depression and denied SI/HI, hallucinations and other psychs symptoms, contracted for safety. Visible in milieu, social and  interactive. independent with all ADLs, alert and oriented x3, able to communicate in english and make needs known.

## 2021-11-17 NOTE — PROGRESS NOTES
Pt has been calm and partially compliant with med and treatment. She only take 5 mg of her 7.5 mg olanzapine. She denies pain, anxiety and depression,  SI, HI, SIB and all other psych symptoms. She  contracts for safety. She her prn refresh for eyes dryness. She has been visible on the unit prior bed times. The writer is continuing to  Monitor and assist pt as needed.

## 2021-11-17 NOTE — PROGRESS NOTES
Hospital medicine service follow-up medical management.      Interval history  Blood sugars stable  No eye discomfort, no new vision changes.   Denies chest pain or shortness of breath  Wants to establish PCP in [place] stating not going back to [place]      Assessment and Plan:  Type 2 Diabetes, Insulin Dependent, Hgb A1c 9.3  Blood Glucose improved.  Continue current DM regimen.  DM discharge supplies paper prescriptions provided.    Hypothyroidism: Elevated TSH on admission 69.88.  Per chart review noncompliant with medication.  Levothyroxine restarted on admission.  Agree recommend recheck thyroid panel in 4 to 6 weeks.  This can be done outpatient.    Primary Hypertension: Controlled.  Intermittently refuses blood pressure medication.  Continue low-dose amlodipine.    Dry Eyes: Improved. No acute vision changes. Continue eye drops.     Paranoid schizophrenia  Medication noncompliance  Management per primary team.        Primary team can reconsult hospital medicine service for nonpsychiatric discharge medication reconciliation once final disposition in place.        Peripheral chart check periodically.           CHRIST Bateman, CNP  Hospital Medicine Service  [facility]

## 2021-11-17 NOTE — PROGRESS NOTES
"PSYCHIATRY  PROGRESS NOTE     DATE OF SERVICE   11/16/2021        CHIEF COMPLAINT   \"I know someone is going to try to kill me.\"       SUBJECTIVE   Nursing reports: Patient slept 4 hours last night. Patient remains paranoid and refusing to take Zyprexa 7.5 mg, she only took 5mg.    Patient has been discussed with the  earlier today during team meeting.  Assessment/Intervention/Current Symtoms and Care Coordination  Writer met with patient to discuss discharge. Patient requested that she has eye drops from her belongings. Psychiatrist has approved eye drops. Patient requested to wear her personal belongings and has agreed to wait for approval. Patient noted no other concerns at this time. Patient was out on unit and interacting with staff. Patient has refused to adjust medications at this time and remains paranoid.     Writer spoke to brother in law. Brother in law will continue to support and encourage patient to take medications. Writer provided information concerning flight from [place] to [place] on December 3rd. Rosie will cancel flight and reschedule as necessary.     OBJECTIVE   Patient was seen and evaluated in the day area by her self, this was a face to face evaluation. Interview was conducted in Kyrgyz by this writer. Patient remains paranoid thinking that someone is sending her messages saying that \"they\" are going to kill her. She tells me that someone send her a threatening message in a salsa packet. She also thinks that someone came in the middle of the night and isabella her forehead. Patient has a minor scratch that has been present there since the admission. Patient refuses to take a higher dose of Zyprexa or change the medication because as per patient she has been on Zyprexa 5 mg for 20 years. At one point patient was screaming at this writer and was redirected successfully. I encourage the patient to speak with her family about this matter.     I was able to speak with patient's sister " "over the phone. Sister tells me that the patient continues to be very paranoid and they are supportive of the patient staying in the hospital. She also said that the patient needs and . I explained the sister that the patient has fired the  as she is paranoid but this writer and my PA student we both speak [place]i. Sister said that the patient is afraid of developing side effects from a higher dose of Zyprexa as she has a friend that was very sedated from this medication. Sister is going to continue communicating with the patient and encouraging her to be compliant with the treatment.      MEDICATIONS   Medications:  Scheduled Meds:    amLODIPine  5 mg Oral Daily     aspirin  81 mg Oral Daily     insulin aspart  1-3 Units Subcutaneous TID AC     insulin glargine  10 Units Subcutaneous At Bedtime     levothyroxine  25 mcg Oral QAM AC     OLANZapine zydis  7.5 mg Oral At Bedtime     rosuvastatin  5 mg Oral Daily     sitagliptin  100 mg Oral Daily     Continuous Infusions:  PRN Meds:.acetaminophen, alum & mag hydroxide-simethicone, artificial tears ophthalmic solution, glucose **OR** dextrose **OR** glucagon, gabapentin, nicotine, OLANZapine **OR** OLANZapine, polyethylene glycol, traZODone    Medication adherence issues: MS Med Adherence Y/N: Yes, Hospitalization  Medication side effects: MEDICATION SIDE EFFECTS: no side effects reported  Benefit: Yes / No: Yes       ROS   A comprehensive review of systems was negative.       MENTAL STATUS EXAM   Vitals: /58 (BP Location: Right arm)   Pulse 77   Temp 98.2  F (36.8  C) (Oral)   Resp 16   Wt 53.6 kg (118 lb 3.2 oz)   SpO2 96%     Appearance:  No apparent distress  Mood: \"I am paranoid and scared\"  Affect: restricted and irritable was congruent to speech  Suicidal Ideation: PRESENT / ABSENT: absent   Homicidal Ideation: PRESENT / ABSENT: absent   Thought process: perseverating on her delusions  Thought content: significant for obsessions  " and paranoid ideation.   Fund of Knowledge: Average  Attention/Concentration: Normal  Language ability:  Intact  Memory:  Immediate recall intact, Short-term memory intact and Long-term memory intact  Insight: Limited  Judgement: Limited  Orientation: Yes, x4  Psychomotor Behavior: normal or unremarkable    Muscle Strength and Tone: MuscleStrength: Normal  Gait and Station: Stable on her feet       LABS   personally reviewed.     No results found for: PHENYTOIN, PHENOBARB, VALPROATE, CBMZ       DIAGNOSIS   Principal Problem:    Paranoid schizophrenia, chronic condition with acute exacerbation (H)    Active Problem List:  Patient Active Problem List   Diagnosis     Psychosis (H)     Paranoid schizophrenia, chronic condition with acute exacerbation (H)     Type 2 diabetes mellitus without complication, with long-term current use of insulin (H)     Acquired hypothyroidism     Hyperlipidemia LDL goal <130     Personal history of noncompliance with medical treatment, presenting hazards to health          PLAN   1. Ongoing education given regarding diagnostic and treatment options with risks, benefits and alternatives and adequate verbalization of understanding.  2. Medications       Increase Zyprexa to 7.5 mg at bedtime    3.   coordinating a safe discharge plan with the family.  4.  Medical team currently following the patient.        Risk Assessment: [facility] RISK ASSESSMENT: Patient able to contract for safety    Coordination of Care:   Treatment Plan reviewed and physician signed, Care discussed with Care/Treatment Team Members, Chart reviewed and Patient seen      Re-Certification I certify that the inpatient psychiatric facility services furnished since the previous certification were, and continue to be, medically necessary for, either, treatment which could reasonably be expected to improve the patient s condition or diagnostic study and that the hospital records indicate that the services furnished  were, either, intensive treatment services, admission and related services necessary for diagnostic study, or equivalent services.     I certify that the patient continues to need, on a daily basis, active treatment furnished directly by or requiring the supervision of inpatient psychiatric facility personnel.   I estimate 7 days of hospitalization is necessary for proper treatment of the patient. My plans for post-hospital care for this patient are  home with family     Cyndi Hill MD    -     11/16/2021  -     6:56 PM    Total time 35 minutes with > 50%spent on coordination of cares and psycho-education.     This note was created with help of Dragon dictation system. Grammatical / typing errors are not intentional.    Cyndi Hill MD

## 2021-11-17 NOTE — PLAN OF CARE
"Assessment/Intervention/Current Symtoms and Care Coordination  Writer met with patient to discuss discharge. Patient expressed that people were still trying to kill her and her family. Noted that she had taken Zyprexa in the past with good results. Although she can acknowledge that medications are helpful she continues to decline an increase in her dose. Patient is afraid of side effects and stated \"I will sleep all day,\" Writer encouraged patient to consider trying to increase her medications as this had been helpful in the past.    Writer spoke to brother in law. Brother in law and sister remain supportive of patient remaining in the hospital until she is stabilized. Writer explained commitment process which they support if this step becomes necessary for medication compliance.    Discharge Plan or Goal Return to brother in law/sister's home, [facility] and psychiatry.    Barriers to Discharge Symptom stabilization medication management and care coordination    Referral Status pending psychiatry [facility]    Pt Contacts:   brother in-lawSarbjit [phone]    Legal Status Voluntary      Zaida Crane MA [facility], 11/12/2021, 2:44 PM   "

## 2021-11-17 NOTE — PROGRESS NOTES
11/17/21 1533   Engagement   Intervention Group   Topic Detail beading for concentration, frustration tolerance, leisure exploration, positive thinking and communication of needs   Attendance Attended   Patient Response Demonstrated understanding of materials provided;Improved mood in response to group;Was respectful;Contributes to conversation  (Pt reported that her favorite colors were purple, marito, blue, and yellow because they made her feel calm, safe, and alive.)   Concentrated on Task duration of group   Cognition Goal-directed;Takes initiative;Follows through with task;Sequences task;Attends to detail  (Pt used a sample for inspiration, but followed a pattern, she created, while designing her bracelet)   Mood/Affect Pleasant   Social/Behavioral Cooperative;Engaged  (Pt worked diligently throughout group to finish her bracelet)   Thought Content [place]   Supervision   Supervision On-site supervision provided

## 2021-11-17 NOTE — PROGRESS NOTES
11/17/21 1040   Engagement   Intervention Group   Topic Detail Card games for turn taking, sequencing, following instructions, and coping with sx through distraction   Attendance Attended   Patient Response Needs reinforcement/repetition to learn materials;Was respectful;Contributes to conversation  (Pt required verbal cues throughout the activity to follow the instructions correctly, but benefited from reptetition. She reported towards the end of group she just wanted to observe.)   Concentrated on Task duration of group   Cognition Goal-directed   Mood/Affect Pleasant   Social/Behavioral Engaged;Cooperative   Thought Content [place]   Goals addressed in session today Pt thanked writer before leaving group and reported enjoying the activity.   Supervision   Supervision On-site supervision provided

## 2021-11-17 NOTE — PLAN OF CARE
BEHAVIORAL TEAM DISCUSSION    Participants:   -Dr Moy SOUZA  - Brielle PRETTY     RN  - Zaida SANCHEZ  - Samreen ESQUIVEL       OT  -Porsha KITCHEN OT Student  - Flor WymanD    Progress: Improving- starting medications   Anticipated length of stay: TBD currently refusing increase in medications remains symptomatic  Continued Stay Criteria/Rationale: Symptom stabilization - remains paranoid  Medical/Physical:   Diabetes mellitus hypothyroidism hypothyroidism  Precautions:   Behavioral Orders   Procedures     Code 1 - Restrict to Unit     Routine Programming     As clinically indicated     Status 15     Every 15 minutes.     Plan: Return to brother in law/sisters home with psychiatry  Rationale for change in precautions or plan: No changes

## 2021-11-18 LAB
GLUCOSE BLDC GLUCOMTR-MCNC: 113 MG/DL (ref 70–99)
GLUCOSE BLDC GLUCOMTR-MCNC: 202 MG/DL (ref 70–99)
GLUCOSE BLDC GLUCOMTR-MCNC: 247 MG/DL (ref 70–99)
GLUCOSE BLDC GLUCOMTR-MCNC: 98 MG/DL (ref 70–99)

## 2021-11-18 PROCEDURE — 128N000001 HC R&B CD/MH ADULT

## 2021-11-18 PROCEDURE — 99232 SBSQ HOSP IP/OBS MODERATE 35: CPT | Performed by: PSYCHIATRY & NEUROLOGY

## 2021-11-18 PROCEDURE — 250N000013 HC RX MED GY IP 250 OP 250 PS 637: Performed by: PSYCHIATRY & NEUROLOGY

## 2021-11-18 PROCEDURE — 250N000013 HC RX MED GY IP 250 OP 250 PS 637: Performed by: NURSE PRACTITIONER

## 2021-11-18 RX ADMIN — AMLODIPINE BESYLATE 5 MG: 5 TABLET ORAL at 08:20

## 2021-11-18 RX ADMIN — LEVOTHYROXINE SODIUM 25 MCG: 25 TABLET ORAL at 06:45

## 2021-11-18 RX ADMIN — ROSUVASTATIN CALCIUM 5 MG: 5 TABLET, FILM COATED ORAL at 08:20

## 2021-11-18 RX ADMIN — SITAGLIPTIN 100 MG: 25 TABLET, FILM COATED ORAL at 08:20

## 2021-11-18 RX ADMIN — CARBOXYMETHYLCELLULOSE SODIUM 1 DROP: 0.5 SOLUTION/ DROPS OPHTHALMIC at 20:11

## 2021-11-18 RX ADMIN — OLANZAPINE 7.5 MG: 5 TABLET, ORALLY DISINTEGRATING ORAL at 20:10

## 2021-11-18 RX ADMIN — INSULIN ASPART 1 UNITS: 100 INJECTION, SOLUTION INTRAVENOUS; SUBCUTANEOUS at 17:07

## 2021-11-18 ASSESSMENT — ACTIVITIES OF DAILY LIVING (ADL)
ORAL_HYGIENE: INDEPENDENT
HYGIENE/GROOMING: INDEPENDENT
DRESS: SCRUBS (BEHAVIORAL HEALTH)
HYGIENE/GROOMING: HANDWASHING;INDEPENDENT
DRESS: SCRUBS (BEHAVIORAL HEALTH)
ORAL_HYGIENE: INDEPENDENT

## 2021-11-18 NOTE — PROVIDER NOTIFICATION
"   11/18/21 1600   Engagement   Intervention Group   Topic Detail SW Process   Attendance Attended   Patient Response Demonstrated understanding of materials provided   Concentrated on Task duration of group   Cognition Preoccupied   Mood/Affect Pleasant   Social/Behavioral Engaged   Thought Content Delusional     GROUP LENGTH (MINS):   30   RELEVANT PRIMARY DIAGNOSIS: Patient Active Problem List   Diagnosis     Psychosis (H)     Paranoid schizophrenia, chronic condition with acute exacerbation (H)     Type 2 diabetes mellitus without complication, with long-term current use of insulin (H)     Acquired hypothyroidism     Hyperlipidemia LDL goal <130     Personal history of noncompliance with medical treatment, presenting hazards to health        TREATMENT MODALITY:      []CBT       []DBT       []ACT       []Interpersonal psychotherapy                                 []Psychoeducational therapy       [x]Skill development       []Other:        ATTENDANCE:        [x]Stayed for entire group     []Arrived late    [] Left early       # OF PATIENTS IN GROUP: 2   BILLABLE:     []Yes            [x]No   Notes:   Patient brought out a hot sauce packet from her pocket and reported the \"playful\" message printed on the side by the  was from the person who is trying to kill her.       "

## 2021-11-18 NOTE — PLAN OF CARE
Assessment/Intervention/Current Symtoms and Care Coordination  Writer met with patient to discuss discharge. Patient was concerned that a bank card was missing from her items in the hospital. Patient was unsure of location of bank card. Nursing could not locate card on belongings list or in belongings. Writer provided information to customer service for patient to cancel card. Nursing assisted for several hours and patient was connected to [facility].    Patient stated that she had agreed to take her medications and increase her dose.    Writer spoke to brother in law regarding patient's missing bank card. Sister and brother in law stated that it is not in thier possession. Brother in law and sister remain supportive of patient remaining in the hospital until she is stabilized. Brother in law encouraged patient to take her medications in order to return home. Patient agreed at this time.     Discharge Plan or Goal Return to brother in law/sister's home, [facility] and psychiatry.    Barriers to Discharge Symptom stabilization medication management and care coordination    Referral Status pending psychiatry [facility]    Pt Contacts:   brother in-law, Sarbjit Arriaza [phone]    Legal Status Voluntary      Zaida Crane MA [facility], 11/12/2021, 2:44 PM

## 2021-11-18 NOTE — PROGRESS NOTES
"PSYCHIATRY  PROGRESS NOTE     DATE OF SERVICE   11/18/2021           CHIEF COMPLAINT   \"Someone is trying to kill me.\"       SUBJECTIVE   Nursing reports patient slept for most part of the night. Patient slept with the door opened and lights on as requested by her.Patient slept more than 6 hrs. No concerns noted during the shift.     : MD will talk to SW about not petitioning for commitment because patient is now compliant with her medication.        OBJECTIVE   Patient was seen and evaluated in the day area. The interview was conducted in Armenian by Dr. Winter. Dr. Winter and writer are bilingual in Armenian, there was no need for an  during this interview. This was a face-to-face assessment. Patient appeared comfortable. She was respectful, calm and cooperative during the interview. Patient was in a good mood and at one point during the interview she took off her sunglasses and appeared more relaxed. Patient reported getting a good sleep last night. Patient spoke with her brother-in-law who persuaded her to take the increased dose of Zyprexa to prevent paranoia. Patient agreed to this. Patient reported that she took Zyprexa 7.5 mg last night which made her feel more relaxed this morning. She wants to continue taking this dose of Zyprexa to help with her symptoms and to continue improving. Patient is aware that she needs to take her medication regularly as she \"knows that 1 day of taking the medicine won't make a difference\". Patient still reports a concern about seeing messages on the TV saying that people from [place] followed here and want to kill her and her family. Patient also mentioned that someone was in her room last night.        MEDICATIONS   Medications:  Scheduled Meds:    amLODIPine  5 mg Oral Daily     aspirin  81 mg Oral Daily     insulin aspart  1-3 Units Subcutaneous TID AC     insulin glargine  10 Units Subcutaneous At Bedtime     levothyroxine  25 mcg Oral QAM AC     " OLANZapine zydis  7.5 mg Oral At Bedtime     rosuvastatin  5 mg Oral Daily     sitagliptin  100 mg Oral Daily     Continuous Infusions:  PRN Meds:.acetaminophen, alum & mag hydroxide-simethicone, artificial tears ophthalmic solution, glucose **OR** dextrose **OR** glucagon, gabapentin, nicotine, OLANZapine **OR** OLANZapine, polyethylene glycol, traZODone    Medication adherence issues: MS Med Adherence Y/N: yes   Medication side effects: MEDICATION SIDE EFFECTS: no side effects reported  Benefit: Yes / No: Yes       ROS   A comprehensive review of systems was negative.       MENTAL STATUS EXAM   Vitals: BP (!) 140/63   Pulse 74   Temp 98.2  F (36.8  C) (Oral)   Resp 18   Wt 53.6 kg (118 lb 3.2 oz)   SpO2 100%     Appearance:  appears comfortable and is in no apparent distress  Mood:  {Mood: Fearful  Affect: appropriate and calm was congruent to speech  Suicidal Ideation: PRESENT / ABSENT: absent   Homicidal Ideation: PRESENT / ABSENT: absent   Thought process: perseverating on her delusion   Thought content: obsessions and paranoid ideation    Fund of Knowledge: Average  Attention/Concentration: Normal  Language ability:  Intact  Memory:  Immediate recall intact  Insight:  struggles to maintain insight.  Judgement: limited  Orientation: Yes, x4  Psychomotor Behavior: normal or unremarkable    Muscle Strength and Tone: MuscleStrength: Normal  Gait and Station: Normal       LABS   personally reviewed.     No results found for: PHENYTOIN, PHENOBARB, VALPROATE, CBMZ       DIAGNOSIS   Principal Problem:    Paranoid schizophrenia, chronic condition with acute exacerbation (H)    Active Problem List:  Patient Active Problem List   Diagnosis     Psychosis (H)     Paranoid schizophrenia, chronic condition with acute exacerbation (H)     Type 2 diabetes mellitus without complication, with long-term current use of insulin (H)     Acquired hypothyroidism     Hyperlipidemia LDL goal <130     Personal history of  noncompliance with medical treatment, presenting hazards to health          PLAN   1. Ongoing education given regarding diagnostic and treatment options with risks, benefits and alternatives and adequate verbalization of understanding.  2. Zyprexa 7.5 mg at bedtime.  3. Medical team currently following the patient  4. SW coordinating a safe discharge plan with the patient and her family     Patient was seen and evaluated with the Pa student. I agree with her assessment evaluation of the patient.     Risk Assessment: [facility] RISK ASSESSMENT: Patient able to contract for safety    Coordination of Care:   Treatment Plan reviewed and physician signed, Care discussed with Care/Treatment Team Members, Chart reviewed and Patient seen      Re-Certification I certify that the inpatient psychiatric facility services furnished since the previous certification were, and continue to be, medically necessary for, either, treatment which could reasonably be expected to improve the patient s condition or diagnostic study and that the hospital records indicate that the services furnished were, either, intensive treatment services, admission and related services necessary for diagnostic study, or equivalent services.     I certify that the patient continues to need, on a daily basis, active treatment furnished directly by or requiring the supervision of inpatient psychiatric facility personnel.   I estimate 14 days of hospitalization is necessary for proper treatment of the patient. My plans for post-hospital care for this patient are  home with self-care     Teena Mccarthy    -     11/18/2021  -     1:21 PM     Total time  25 minutes with > 50%spent on coordination of cares and psycho-education.    This note was created with help of Dragon dictation system. Grammatical / typing errors are not intentional.    Teena Mccarthy

## 2021-11-18 NOTE — PROGRESS NOTES
11/18/21 1527   Engagement   Intervention Group   Topic Detail Creative endeavor (suncatcher) for healthy leisure, instruction following, attention to detail, coping with sx through distractions.   Attendance Did not attend   Reason for Not Attending Excused  (Pt was on the phone with her bank)   Supervision   Supervision On-site supervision provided

## 2021-11-18 NOTE — PLAN OF CARE
Problem: Behavioral Health Plan of Care  Goal: Plan of Care Review  Outcome: Improving  Flowsheets (Taken 11/18/2021 1602)  Plan of Care Reviewed With: patient  Patient Agreement with Plan of Care: agrees  Goal: Patient-Specific Goal (Individualization)  Outcome: Improving  Note:     Goal: Adheres to Safety Considerations for Self and Others  Outcome: Improving  Intervention: Develop and Maintain Individualized Safety Plan  Recent Flowsheet Documentation  Taken 11/18/2021 1602 by Shruti Vazquez RN  Safety Measures:    environmental rounds completed    safety rounds completed  Goal: Absence of New-Onset Illness or Injury  Outcome: Improving  Intervention: Identify and Manage Fall Risk  Recent Flowsheet Documentation  Taken 11/18/2021 1602 by Shruti Vazquez RN  Safety Measures:    environmental rounds completed    safety rounds completed  Goal: Optimized Coping Skills in Response to Life Stressors  Outcome: Improving  Goal: Develops/Participates in Therapeutic Concord to Support Successful Transition  Outcome: Improving  Intervention: Foster Therapeutic Concord  Recent Flowsheet Documentation  Taken 11/18/2021 1602 by Shruti Vazquez RN  Trust Relationship/Rapport:    care explained    choices provided    questions answered    thoughts/feelings acknowledged     Problem: Behavior Regulation Impairment (Psychotic Signs/Symptoms)  Goal: Improved Behavioral Control (Psychotic Signs/Symptoms)  Outcome: Improving     Problem: Cognitive Impairment (Psychotic Signs/Symptoms)  Goal: Optimal Cognitive Function (Psychotic Signs/Symptoms)  Outcome: Improving  Intervention: Support and Promote Cognitive Ability  Recent Flowsheet Documentation  Taken 11/18/2021 1602 by Shruti Vazquez RN  Trust Relationship/Rapport:    care explained    choices provided    questions answered    thoughts/feelings acknowledged     Problem: Decreased Participation and Engagement (Psychotic Signs/Symptoms)  Goal: Increased Participation and  Engagement (Psychotic Signs/Symptoms)  Outcome: Improving  Intervention: Facilitate Participation and Engagement  Recent Flowsheet Documentation  Taken 11/18/2021 1602 by Shruti Vazquez RN  Diversional Activity: television     Problem: Mood Impairment (Psychotic Signs/Symptoms)  Goal: Improved Mood Symptoms (Psychotic Signs/Symptoms)  Outcome: Improving  Intervention: Optimize Emotion and Mood  Recent Flowsheet Documentation  Taken 11/18/2021 1602 by Shruti Vazquez RN  Diversional Activity: television     Problem: Psychomotor Impairment (Psychotic Signs/Symptoms)  Goal: Improved Psychomotor Symptoms (Psychotic Signs/Symptoms)  Outcome: Improving  Intervention: Manage Psychomotor Movement  Recent Flowsheet Documentation  Taken 11/18/2021 1602 by Shruti Vazquez RN  Diversional Activity: television     Problem: Sensory Perception Impairment (Psychotic Signs/Symptoms)  Goal: Decreased Sensory Symptoms (Psychotic Signs/Symptoms)  Outcome: Improving     Problem: Sleep Disturbance (Psychotic Signs/Symptoms)  Goal: Improved Sleep (Psychotic Signs/Symptoms)  Outcome: Improving     Problem: Social, Occupational or Functional Impairment (Psychotic Signs/Symptoms)  Goal: Enhanced Social, Occupational or Functional Skills (Psychotic Signs/Symptoms)  Outcome: Improving  Intervention: Promote Social, Occupational and Functional Ability  Recent Flowsheet Documentation  Taken 11/18/2021 1602 by Shruti Vazquez RN  Trust Relationship/Rapport:    care explained    choices provided    questions answered    thoughts/feelings acknowledged     Problem: Sleep Disturbance  Goal: Adequate Sleep/Rest  Outcome: Improving     Problem: Suicidal Behavior  Goal: Suicidal Behavior is Absent or Managed  Outcome: Improving  Patient calm, pleasant and cooperative. Med complaint. Pt denies pain/discomfort. Patient denied SI, HI, SIB and all psych symptoms. Patient states appears to be paranoid, and does not seem to trust people.  Visible in the  milieu for meals and socializing with peers. Contracts for safety. BG before supper 202, .

## 2021-11-18 NOTE — PLAN OF CARE
Problem: Behavioral Health Plan of Care  Goal: Adheres to Safety Considerations for Self and Others  Intervention: Develop and Maintain Individualized Safety Plan  Recent Flowsheet Documentation  Taken 11/18/2021 2465 by Taylor Muniz RN  Safety Measures: safety rounds completed     Problem: Suicidal Behavior  Goal: Suicidal Behavior is Absent or Managed  Outcome: Improving  Flowsheets (Taken 11/18/2021 2686)  Mutually Determined Action Steps (Suicidal Behavior Absent/Managed): identifies home safety strategy     Problem: Sleep Disturbance  Goal: Adequate Sleep/Rest  Outcome: Improving        Patient was observed sleeping for most part of the night. Safety checks completed Q 15 min's and door opened to promote sleep. No concerns noted during this shift. She had more than 6 hrs of sleep.

## 2021-11-18 NOTE — PROGRESS NOTES
"   11/18/21 1030   Engagement   Intervention Group   Topic Detail Self-care/self-nuture with sensory exploration for creative expression, relaxation, and coping with sx through distraction   Attendance Attended   Patient Response Was respectful;Positive attitude;Contributes to conversation;Asked questions and/or took notes  (Pt attempted to file her nails but told writer she didn't know how to use it because \"the ladies at the salon usually do it\" and accepted assistance from writer. She reported what song she wanted to listen to on the iPad.)   Concentrated on Task duration of group   Cognition Goal-directed   Mood/Affect Pleasant   Social/Behavioral Engaged;Cooperative   Thought Content [place]   Goals addressed in session today Pt reported she likes listening to the \"oldies\", chose Airk Codie song. She also was excited to paint her nails and chose a white color because \"it goes with everything\".   Supervision   Supervision On-site supervision provided     "

## 2021-11-18 NOTE — PLAN OF CARE
Problem: Behavioral Health Plan of Care  Goal: Plan of Care Review  Outcome: No Change  Flowsheets (Taken 11/18/2021 0800)  Plan of Care Reviewed With: patient  Patient Agreement with Plan of Care: agrees     Problem: Social, Occupational or Functional Impairment (Psychotic Signs/Symptoms)  Goal: Enhanced Social, Occupational or Functional Skills (Psychotic Signs/Symptoms)  Intervention: Promote Social, Occupational and Functional Ability  Recent Flowsheet Documentation  Taken 11/18/2021 0800 by Char Espinoza RN  Trust Relationship/Rapport:   care explained   choices provided   emotional support provided   empathic listening provided   questions answered   questions encouraged   reassurance provided   thoughts/feelings acknowledged    Patient denies pain/discomfort. Patient denied all psych symptoms. Patient states appears to be paranoid, she believes someone was in her room last night. Med compliant with exception of Asprin this shift. Attended groups. Visible in the milieu socializing with peers. Contracts for safety. No behaviors noted.

## 2021-11-19 LAB
GLUCOSE BLDC GLUCOMTR-MCNC: 135 MG/DL (ref 70–99)
GLUCOSE BLDC GLUCOMTR-MCNC: 170 MG/DL (ref 70–99)
GLUCOSE BLDC GLUCOMTR-MCNC: 196 MG/DL (ref 70–99)
GLUCOSE BLDC GLUCOMTR-MCNC: 274 MG/DL (ref 70–99)

## 2021-11-19 PROCEDURE — 128N000001 HC R&B CD/MH ADULT

## 2021-11-19 PROCEDURE — 250N000013 HC RX MED GY IP 250 OP 250 PS 637: Performed by: PSYCHIATRY & NEUROLOGY

## 2021-11-19 PROCEDURE — 250N000013 HC RX MED GY IP 250 OP 250 PS 637: Performed by: NURSE PRACTITIONER

## 2021-11-19 PROCEDURE — 99232 SBSQ HOSP IP/OBS MODERATE 35: CPT | Performed by: PSYCHIATRY & NEUROLOGY

## 2021-11-19 RX ADMIN — LEVOTHYROXINE SODIUM 25 MCG: 25 TABLET ORAL at 06:47

## 2021-11-19 RX ADMIN — INSULIN ASPART 1 UNITS: 100 INJECTION, SOLUTION INTRAVENOUS; SUBCUTANEOUS at 12:18

## 2021-11-19 RX ADMIN — INSULIN ASPART 1 UNITS: 100 INJECTION, SOLUTION INTRAVENOUS; SUBCUTANEOUS at 16:50

## 2021-11-19 RX ADMIN — CARBOXYMETHYLCELLULOSE SODIUM 1 DROP: 0.5 SOLUTION/ DROPS OPHTHALMIC at 12:25

## 2021-11-19 RX ADMIN — ROSUVASTATIN CALCIUM 5 MG: 5 TABLET, FILM COATED ORAL at 09:06

## 2021-11-19 RX ADMIN — SITAGLIPTIN 100 MG: 25 TABLET, FILM COATED ORAL at 09:06

## 2021-11-19 RX ADMIN — AMLODIPINE BESYLATE 5 MG: 5 TABLET ORAL at 09:06

## 2021-11-19 RX ADMIN — OLANZAPINE 7.5 MG: 5 TABLET, ORALLY DISINTEGRATING ORAL at 20:31

## 2021-11-19 ASSESSMENT — ACTIVITIES OF DAILY LIVING (ADL)
HYGIENE/GROOMING: INDEPENDENT
ORAL_HYGIENE: INDEPENDENT
DRESS: SCRUBS (BEHAVIORAL HEALTH)

## 2021-11-19 NOTE — PLAN OF CARE
Problem: Behavioral Health Plan of Care  Goal: Adheres to Safety Considerations for Self and Others  Intervention: Develop and Maintain Individualized Safety Plan  Recent Flowsheet Documentation  Taken 11/19/2021 0153 by Taylor Muniz, RN  Safety Measures: safety rounds completed  Goal: Absence of New-Onset Illness or Injury  Intervention: Identify and Manage Fall Risk  Recent Flowsheet Documentation  Taken 11/19/2021 0153 by Taylor Muniz, RN  Safety Measures: safety rounds completed     Problem: Sleep Disturbance  Goal: Adequate Sleep/Rest  Outcome: Improving      Patient had more than 6 hrs of uninterrupted sleep during this shift. Safety checks completed per unit policy. No concern noted/reported. Will continued to monitor.

## 2021-11-19 NOTE — PLAN OF CARE
Problem: Behavioral Health Plan of Care  Goal: Plan of Care Review  Outcome: No Change     Problem: Behavioral Health Plan of Care  Goal: Adheres to Safety Considerations for Self and Others  Outcome: No Change  Intervention: Develop and Maintain Individualized Safety Plan  Recent Flowsheet Documentation  Taken 11/19/2021 0905 by Macy Whitt RN  Safety Measures:    safety rounds completed    suicide assessment completed     Problem: Psychomotor Impairment (Psychotic Signs/Symptoms)  Goal: Improved Psychomotor Symptoms (Psychotic Signs/Symptoms)  Outcome: No Change     Pt is alert and oriented. Calm, pleasant and cooperative with care. She is med compliant, except declined Asprin. Attending groups, active in the milieu.   Pt denies anxiety or depression. She denies any subjective psychosis currently. Still appears paranoid aeb insisting on sleeping with the lights on and door open. She also request staff stay with her while she showered. Pt denies SI/HI.     Pt denies any pain concerns at this time.   BG checks-135 and 170 this shift.   PRN refresh eye drops given as requested.

## 2021-11-19 NOTE — PLAN OF CARE
"Assessment/Intervention/Current Symtoms and Care Coordination  Writer met with patient to discuss discharge.Patient stated that she plans on increasing her medications however states \"I do not think it will work\" \"they still try to talk to me from the TV\". Patient is able to process her paranoia. She identified that \"keeping busy\" distracts her from her thoughts. She has been reading a recipe book that she enjoys. Patient remains compliant with remaining in the hospital until she is stabilized.    Discharge Plan or Goal Return to brother in law/sister's home, [facility] and psychiatry.    Barriers to Discharge Symptom stabilization medication management and care coordination    Referral Status pending psychiatry [facility]    Pt Contacts:   brother in-law, Sarbjit Arriaza [phone]    Legal Status Voluntary      Zaida Crane MA [facility], 11/12/2021, 2:44 PM   "

## 2021-11-19 NOTE — PROGRESS NOTES
11/19/21 1503   Engagement   Intervention Group   Topic Detail Yarn basket activity for concentration, sequencing, and creative expression   Attendance Attended   Patient Response Demonstrated understanding of materials provided;Contributes to conversation;Was respectful;Positive attitude  (Pt contributed to conversation requesting several songs to listen to on the iPad and sang along to a few. She also asked if she could finish the project tomorrow.)   Concentrated on Task duration of group   Cognition Goal-directed;Sequences task;Follows through with task;Attends to detail  (Pt required one verbal cue but was able to follow the original instructions and sequence the activity correctly.)   Mood/Affect Pleasant   Social/Behavioral Engaged;Cooperative   Thought Content [place]   Supervision   Supervision On-site supervision provided

## 2021-11-19 NOTE — PLAN OF CARE
"Goal review completed:     Problem: Coping with Symptoms  Goal: Identify Coping Skills  Description: Patient will identify 3 healthy coping skills to manage stress and reduce symptoms this review period    Outcome: Improving    Patient was receptive to meeting with writer. Care plan goals have been reviewed. Patient has attended 9/11 groups this review period (staying for the duration of group and consistently engaging in the activities). Continue care plan and interventions for further progress related to identifying additional healthy coping skills to manage stress and reduce symptoms. Patient has made progress towards goals identifying two healthy coping skills. While meeting with writer patient identified dancing/listening to music (the \"oldies\") and cooking as ways she manages stress. Pt reported she enjoys coming to group because \"my mind gets off my worries\". Patient has a difficult time generating ideas. When asked what activities she has enjoyed in group or what activities she would like to do she shared \"whatever you do is okay with me, it helps me distract my mind\". However, patient later shared she enjoyed the beading group and shared stories with writer about past beading projects. She also shared with writer that she has been experiencing hallucinations and while she reported she doesn't think the halluciantions are real but they seem real because \"the TV tells me it's real\". She told writer that she didn't think they would leave and she would be \"stuck in the hospital\". Writer provided encouragement and therapeutic support to patient.    Continue to establish rapport and encourage group participation with focus on goal area/s for further progress. Care plan updated to reflect changes. Continue to correspond with interdisciplinary team regarding ongoing treatment plan, potential changes in patient's status, and discharge planning.    "

## 2021-11-19 NOTE — PROGRESS NOTES
"PSYCHIATRY  PROGRESS NOTE     DATE OF SERVICE   11/19/2021       CHIEF COMPLAINT   \"Someone wants to kill me.\"       SUBJECTIVE   Nursing reports patient had more than 6 hrs of uninterrupted sleep last night. No concerns noted during the shift.    : will continue to work with patient and her family to coordinate a discharge plan.      OBJECTIVE   Patient was seen and evaluated in the day area. The interview was conducted in Ug[place]an by this writer. Writer is bilingual in Ug[place]an and there was no need for an  during the interview. Patient appeared comfortable with my presence. Patient was calm, respectful and cooperative during the interview. Patient took Zyprexa 7.5 mg at bedtime. Patient reported being able to get a good sleep last night. She kept the door opened and the lights on during the night. She feels rested this morning but continues to be suspicious and paranoid. Patient reported seeing a message on the TV this morning, she did not want to read it because she \"already knows is for her\". Patient said that she knows this message was about someone wanting to kill her by lunch time by poisoning her food. She reported that she has been seeing and hearing messages on the TV about someone wanting to kill her and her family. Patient reported that she is still getting messages in salsa packets. Patient wanted to show me a new message she got but she could not find it and said \"it disappeared, someone took it\". Patient reported that this was a new message that said \"you seem to be unforggetable\" which she interprets as someone being after her trying to kill her. Patient has agreed to continue taking Zyprexa 7.5 mg over the weekend as previously discussed with Dr. Winter. Patient feels hopeless about this and said \"it does not matter how many pills I take, or how high is the dose, someone is still trying to kill me\".      MEDICATIONS   Medications:  Scheduled Meds:    amLODIPine  5 mg Oral " Daily     aspirin  81 mg Oral Daily     insulin aspart  1-3 Units Subcutaneous TID AC     insulin glargine  10 Units Subcutaneous At Bedtime     levothyroxine  25 mcg Oral QAM AC     OLANZapine zydis  7.5 mg Oral At Bedtime     rosuvastatin  5 mg Oral Daily     sitagliptin  100 mg Oral Daily     Continuous Infusions:  PRN Meds:.acetaminophen, alum & mag hydroxide-simethicone, artificial tears ophthalmic solution, glucose **OR** dextrose **OR** glucagon, gabapentin, nicotine, OLANZapine **OR** OLANZapine, polyethylene glycol, traZODone    Medication adherence issues: MS Med Adherence Y/N: No  Medication side effects: MEDICATION SIDE EFFECTS: no side effects reported  Benefit: Yes / No: Yes       ROS   A comprehensive review of systems was negative.       MENTAL STATUS EXAM   Vitals: /56 (BP Location: Left arm, Patient Position: Sitting)   Pulse 71   Temp 98.6  F (37  C)   Resp 18   Wt 53.6 kg (118 lb 3.2 oz)   SpO2 99%     Appearance:  No apparent distress  Mood:  {Mood: Fearful  Affect: appropriate  was congruent to speech  Suicidal Ideation: PRESENT / ABSENT: absent   Homicidal Ideation: PRESENT / ABSENT: absent   Thought process: perseverative on her delusion  Thought content: obsessions and paranoid ideation   Fund of Knowledge: Average  Attention/Concentration: Normal  Language ability:  Intact  Memory:  Immediate recall intact  Insight:  struggles to maintain insight.  Judgement: limited  Orientation: Yes, x4  Psychomotor Behavior: normal or unremarkable    Muscle Strength and Tone: MuscleStrength: Normal  Gait and Station: Normal       LABS   personally reviewed.     No results found for: PHENYTOIN, PHENOBARB, VALPROATE, CBMZ       DIAGNOSIS   Principal Problem:    Paranoid schizophrenia, chronic condition with acute exacerbation (H)    Active Problem List:  Patient Active Problem List   Diagnosis     Psychosis (H)     Paranoid schizophrenia, chronic condition with acute exacerbation (H)     Type 2  diabetes mellitus without complication, with long-term current use of insulin (H)     Acquired hypothyroidism     Hyperlipidemia LDL goal <130     Personal history of noncompliance with medical treatment, presenting hazards to health          PLAN   1. Ongoing education given regarding diagnostic and treatment options with risks, benefits and alternatives and adequate verbalization of understanding.  2. Zyprexa 7.5 mg at bed time. Continue monitoring symptoms and will re-evaluate on Monday.  3. Medical team currently following the patient.   4. SW coordinating a safe discharge plan with the patient and her family.      Patient has been seen and evaluated separately.  Case has been discussed with the PA student, I agree with her assessment and evaluation of the patient.    Risk Assessment: [facility] RISK ASSESSMENT: Patient able to contract for safety    Coordination of Care:   Treatment Plan reviewed and physician signed, Care discussed with Care/Treatment Team Members, Chart reviewed and Patient seen      Re-Certification I certify that the inpatient psychiatric facility services furnished since the previous certification were, and continue to be, medically necessary for, either, treatment which could reasonably be expected to improve the patient s condition or diagnostic study and that the hospital records indicate that the services furnished were, either, intensive treatment services, admission and related services necessary for diagnostic study, or equivalent services.     I certify that the patient continues to need, on a daily basis, active treatment furnished directly by or requiring the supervision of inpatient psychiatric facility personnel.   I estimate 14 days of hospitalization is necessary for proper treatment of the patient. My plans for post-hospital care for this patient are home with family.      Teena Mccarthy    -     11/19/2021  -     11:34 AM    Total time  25 minutes with > 50%spent on  coordination of cares and psycho-education.    This note was created with help of Dragon dictation system. Grammatical / typing errors are not intentional.    Teena Mccarthy

## 2021-11-19 NOTE — PROGRESS NOTES
"   11/19/21 1023   Engagement   Intervention Group   Topic Detail OT: Movement activity (exercise dice) to promote physical wellness, daily living skills, coping skills, sequencing, and opportunity for positive social interactions   Attendance Attended   Patient Response Demonstrated understanding of materials provided;Was respectful;Contributes to conversation  (Pt answered check-in question \"How do you like to exericse\" reporting \"I don't\". However, she later shared in group that she enjoyed walking. When asked to share her favorite exercise from the activity pt's initial response was \"I don't know\", but then selected the flutter kick)   Concentrated on Task duration of group   Cognition Goal-directed;Follows through with task   Mood/Affect Pleasant   Social/Behavioral Engaged;Cooperative   Thought Content [place]   Goals addressed in session today Pt ID walking a preferred exercise.   Supervision   Supervision On-site supervision provided     "

## 2021-11-20 LAB
GLUCOSE BLDC GLUCOMTR-MCNC: 207 MG/DL (ref 70–99)
GLUCOSE BLDC GLUCOMTR-MCNC: 208 MG/DL (ref 70–99)
GLUCOSE BLDC GLUCOMTR-MCNC: 209 MG/DL (ref 70–99)
GLUCOSE BLDC GLUCOMTR-MCNC: 253 MG/DL (ref 70–99)

## 2021-11-20 PROCEDURE — 99207 PR SC NO CHARGE VISIT/PATIENT NOT SEEN: CPT | Performed by: NURSE PRACTITIONER

## 2021-11-20 PROCEDURE — 250N000013 HC RX MED GY IP 250 OP 250 PS 637: Performed by: NURSE PRACTITIONER

## 2021-11-20 PROCEDURE — 128N000001 HC R&B CD/MH ADULT

## 2021-11-20 PROCEDURE — 250N000013 HC RX MED GY IP 250 OP 250 PS 637: Performed by: PSYCHIATRY & NEUROLOGY

## 2021-11-20 RX ADMIN — INSULIN ASPART 1 UNITS: 100 INJECTION, SOLUTION INTRAVENOUS; SUBCUTANEOUS at 07:13

## 2021-11-20 RX ADMIN — OLANZAPINE 7.5 MG: 5 TABLET, ORALLY DISINTEGRATING ORAL at 20:20

## 2021-11-20 RX ADMIN — LEVOTHYROXINE SODIUM 25 MCG: 25 TABLET ORAL at 06:30

## 2021-11-20 RX ADMIN — AMLODIPINE BESYLATE 5 MG: 5 TABLET ORAL at 08:21

## 2021-11-20 RX ADMIN — SITAGLIPTIN 100 MG: 25 TABLET, FILM COATED ORAL at 08:20

## 2021-11-20 RX ADMIN — ROSUVASTATIN CALCIUM 5 MG: 5 TABLET, FILM COATED ORAL at 08:21

## 2021-11-20 RX ADMIN — INSULIN ASPART 2 UNITS: 100 INJECTION, SOLUTION INTRAVENOUS; SUBCUTANEOUS at 12:15

## 2021-11-20 RX ADMIN — CARBOXYMETHYLCELLULOSE SODIUM 1 DROP: 0.5 SOLUTION/ DROPS OPHTHALMIC at 12:15

## 2021-11-20 ASSESSMENT — ACTIVITIES OF DAILY LIVING (ADL)
HYGIENE/GROOMING: INDEPENDENT
ORAL_HYGIENE: INDEPENDENT
DRESS: SCRUBS (BEHAVIORAL HEALTH)

## 2021-11-20 NOTE — PROGRESS NOTES
Hospital medicine service chart check. Pre-meal blood glucose still above 200. She is on very high CHO diet (90g CHO). Adjust sliding scale from low intensity to medium intensity. Will discuss with patient.  May need to decrease CHO intake per meal.   Overnight blood glucose within inpatient glycemic range for the most BG readings.  Blood pressures controlled.         CHRIST Bateman, CNP  Hospital Medicine Service  [facility]

## 2021-11-20 NOTE — PROGRESS NOTES
[facility] medicine service chart check.  Follow-up medical management.    Blood glucose have been stable.  Blood pressures controlled.  Continue medical treatment plan.    Non Billable Visit.    Will complete discharge medication reconciliation, non-psychiatric medications over the weekend    CHRIST Bateman, CNP  Hospital Medicine Service  [facility]

## 2021-11-20 NOTE — PLAN OF CARE
"  Problem: Behavioral Health Plan of Care  Goal: Plan of Care Review  Outcome: No Change     Problem: Behavioral Health Plan of Care  Goal: Adheres to Safety Considerations for Self and Others  Outcome: No Change  Intervention: Develop and Maintain Individualized Safety Plan  Recent Flowsheet Documentation  Taken 11/20/2021 0821 by Macy Whitt RN  Safety Measures:    safety rounds completed    suicide assessment completed     Problem: Decreased Participation and Engagement (Psychotic Signs/Symptoms)  Goal: Increased Participation and Engagement (Psychotic Signs/Symptoms)  Outcome: No Change     Pt is alert and oriented. Calm, pleasant and cooperative with care. Refused Asprin otherwise med compliant. She is active in the milieu, social with peers and staff.   Pt denies any pain concerns. She rates anxiety 3/10, denies depression. No SI/HI. Pt denies sx of psychosis but again appears paranoid aeb request staff to stay with her during a shower as she stated she was \"scared\".     BG checks-203, 208 this shift.   She continues to refuse COVID testing.   "

## 2021-11-20 NOTE — PLAN OF CARE
Patient slept most part of the night without any distress. Safety checks were conducted every 15 minutes. No pain was reported and patient denied all psych symptoms. No PRN was given. We will continue to observe and report any changes. Patient was medication compliant. BS was 203, and 1 U of Novolog administered.

## 2021-11-20 NOTE — PLAN OF CARE
Problem: Mood Impairment (Psychotic Signs/Symptoms)  Goal: Improved Mood Symptoms (Psychotic Signs/Symptoms)  Outcome: No Change  Intervention: Optimize Emotion and Mood  Recent Flowsheet Documentation  Taken 11/19/2021 1632 by Ida Calvert, RN  Diversional Activity: television   Out in the milieu engaged with staff and peers, sat quietly watching TV.  Evening BS-191 Pt. Attempted to refuse insulin, later agreed with encouragement and teaching. Pt. States at bedtime she is afraid that staff is going to hurt her in her sleeps, reassured her that staff  is here to help her.  Pt denies pain, and all other psych symptoms.

## 2021-11-20 NOTE — PLAN OF CARE
BEHAVIORAL TEAM DISCUSSION    Participants:   -Dr Moy SOUZA  - Macy MIX   RN  - Zaida SANCHEZ  - Samreen ESQUIVEL       OT  -Porsha KITCHEN OT Student  - Claudia WymanD    Progress: Improving- starting medications   Anticipated length of stay: TBD currently refusing increase in medications remains symptomatic  Continued Stay Criteria/Rationale: Symptom stabilization - remains paranoid  Medical/Physical:   Diabetes mellitus hypothyroidism hypothyroidism  Precautions:   Behavioral Orders   Procedures    Code 1 - Restrict to Unit    Routine Programming     As clinically indicated    Status 15     Every 15 minutes.     Plan: Return to brother in law/sisters home with psychiatry  Rationale for change in precautions or plan: No changes

## 2021-11-20 NOTE — PROGRESS NOTES
Pt was invested in her yarn basket and worked on it steadily throughout the session.  Pt was able to recognize her errors, though not until some time had passed so she had to undo a fair amount in order to correct it.  Pt needed assist to correct the error and was accepting of help for this.  Pt was social with a peer upon approach, but otherwise worked quietly.       11/20/21 1152   Engagement   Intervention Group   Topic Detail Project clean up day for creative expression, concentration, autonomy, healthy leisure, coping, symptom management, self-esteem, sequencing, recognition and practice correcting errors and socialization   Attendance Attended   Patient Response Demonstrated understanding of materials provided;Accepted feedback;Was respectful;Positive attitude   Concentrated on Task duration of group   Cognition Goal-directed;Follows through with task   Mood/Affect Congruent;Content;Pleasant   Social/Behavioral Engaged

## 2021-11-21 LAB
GLUCOSE BLDC GLUCOMTR-MCNC: 107 MG/DL (ref 70–99)
GLUCOSE BLDC GLUCOMTR-MCNC: 192 MG/DL (ref 70–99)
GLUCOSE BLDC GLUCOMTR-MCNC: 200 MG/DL (ref 70–99)
GLUCOSE BLDC GLUCOMTR-MCNC: 206 MG/DL (ref 70–99)

## 2021-11-21 PROCEDURE — 99231 SBSQ HOSP IP/OBS SF/LOW 25: CPT | Performed by: NURSE PRACTITIONER

## 2021-11-21 PROCEDURE — 250N000013 HC RX MED GY IP 250 OP 250 PS 637: Performed by: NURSE PRACTITIONER

## 2021-11-21 PROCEDURE — 128N000001 HC R&B CD/MH ADULT

## 2021-11-21 PROCEDURE — 250N000013 HC RX MED GY IP 250 OP 250 PS 637: Performed by: PSYCHIATRY & NEUROLOGY

## 2021-11-21 RX ORDER — LEVOTHYROXINE SODIUM 25 UG/1
25 TABLET ORAL
Qty: 30 TABLET | Refills: 0 | Status: SHIPPED | OUTPATIENT
Start: 2021-11-22 | End: 2021-12-06

## 2021-11-21 RX ORDER — ASPIRIN 81 MG/1
81 TABLET ORAL DAILY
Qty: 30 TABLET | Refills: 0 | Status: SHIPPED | OUTPATIENT
Start: 2021-11-21 | End: 2021-12-06

## 2021-11-21 RX ORDER — AMLODIPINE BESYLATE 5 MG/1
5 TABLET ORAL DAILY
Qty: 30 TABLET | Refills: 0 | Status: SHIPPED | OUTPATIENT
Start: 2021-11-21 | End: 2021-12-06

## 2021-11-21 RX ORDER — ROSUVASTATIN CALCIUM 5 MG/1
5 TABLET, COATED ORAL DAILY
Qty: 30 TABLET | Refills: 0 | Status: SHIPPED | OUTPATIENT
Start: 2021-11-21 | End: 2021-12-06

## 2021-11-21 RX ADMIN — CARBOXYMETHYLCELLULOSE SODIUM 1 DROP: 0.5 SOLUTION/ DROPS OPHTHALMIC at 13:19

## 2021-11-21 RX ADMIN — ROSUVASTATIN CALCIUM 5 MG: 5 TABLET, FILM COATED ORAL at 08:22

## 2021-11-21 RX ADMIN — SITAGLIPTIN 100 MG: 25 TABLET, FILM COATED ORAL at 08:22

## 2021-11-21 RX ADMIN — OLANZAPINE 7.5 MG: 5 TABLET, ORALLY DISINTEGRATING ORAL at 20:37

## 2021-11-21 RX ADMIN — LEVOTHYROXINE SODIUM 25 MCG: 25 TABLET ORAL at 06:49

## 2021-11-21 ASSESSMENT — ACTIVITIES OF DAILY LIVING (ADL)
HYGIENE/GROOMING: INDEPENDENT
LAUNDRY: UNABLE TO COMPLETE
ORAL_HYGIENE: INDEPENDENT
DRESS: SCRUBS (BEHAVIORAL HEALTH)

## 2021-11-21 NOTE — PLAN OF CARE
Problem: Behavioral Health Plan of Care  Goal: Plan of Care Review  Recent Flowsheet Documentation  Taken 11/20/2021 1600 by Ida Calvert, RN  Plan of Care Reviewed With: patient     Patient calm, and cooperative. Out in the milieu engaged with staff and peers, sitting quietly in the recliner, on and off sleeping, good appetite.  Endorses anxiety 0, depression 0  denies pain, and all other psych symptoms, contracted for safety. Pt. States that she having a good day.  BG checks- 204

## 2021-11-21 NOTE — PLAN OF CARE
Patient was calm and non-aggressive, slept all night without any distress. Safety checks were done every 15 minutes. No Pain was reported and patient denied anxiety, depression and hallucination.No PRN was given and she was medication compliant, BS was 107,we will continue to observe and report changes in behavior.

## 2021-11-21 NOTE — PLAN OF CARE
"  Problem: Behavioral Health Plan of Care  Goal: Plan of Care Review  Outcome: No Change     Problem: Behavioral Health Plan of Care  Goal: Adheres to Safety Considerations for Self and Others  Outcome: No Change  Intervention: Develop and Maintain Individualized Safety Plan  Recent Flowsheet Documentation  Taken 11/21/2021 6141 by Macy Whitt RN  Safety Measures:    suicide assessment completed    safety rounds completed     Problem: Behavior Regulation Impairment (Psychotic Signs/Symptoms)  Goal: Improved Behavioral Control (Psychotic Signs/Symptoms)  Outcome: No Change    Pt is alert and oriented. Calm, pleasant and cooperative with care.   She is active in the milieu, appropriately interacts with peers and staff.   Pt denies pain. She denies anxiety or depression.   She continues to deny sx of psychosis but exhibits possible paranoia aeb statement that she will not drink the water today because \"it tastes funny\".      checks- 107, 192 this shift.     Continues to refuse COVID testing.      "

## 2021-11-21 NOTE — PROGRESS NOTES
Hospital medicine service follow-up.  Diabetes management.    Interval history: Denies chest pain or shortness of breath.  Eating meals.  Agrees to change in CHO diet from 90 g to 75 g to help with hyperglycemia pre-meal.    Physical Exam: Alert, cooperative, non labored breathing, understands simple English.  Moving extremities freely.     Assessment and Plan:  Type 2 Diabetes, Insulin Dependent, Hgb A1c 9.3  CHO diet changed from 90 g to 75 g to help with hyperglycemia pre-meal.   Continue 3 times daily NovoLog insulin medium intensity (sliding scale insulin for inpatient use only)  Lantus 10 units bedtime  In regards to her home Steglatro, admission pharmacy note 11/14/21: took only for short time, then stopped.  Steglatro will not be ordered at discharge and recommend patient discuss with PCP after discharge.    Hypothyroidism: Elevated TSH on admission 69.88.  Per chart review noncompliant with medication.  Levothyroxine restarted on admission.  Agree recommend recheck thyroid panel in 4 to 6 weeks.  This can be done outpatient.     Primary Hypertension: Controlled. Continue low-dose amlodipine.  Continue aspirin      Hemodynamically stable.  Non psychiatric discharge medication reconciliation completed, including diabetes supplies.        CHRIST Bateman, CNP  Hospital Medicine Service  [facility]

## 2021-11-22 LAB
GLUCOSE BLDC GLUCOMTR-MCNC: 142 MG/DL (ref 70–99)
GLUCOSE BLDC GLUCOMTR-MCNC: 156 MG/DL (ref 70–99)
GLUCOSE BLDC GLUCOMTR-MCNC: 179 MG/DL (ref 70–99)
GLUCOSE BLDC GLUCOMTR-MCNC: 95 MG/DL (ref 70–99)

## 2021-11-22 PROCEDURE — 99207 PR SC NO CHARGE VISIT/PATIENT NOT SEEN: CPT | Performed by: NURSE PRACTITIONER

## 2021-11-22 PROCEDURE — 250N000013 HC RX MED GY IP 250 OP 250 PS 637: Performed by: NURSE PRACTITIONER

## 2021-11-22 PROCEDURE — 250N000013 HC RX MED GY IP 250 OP 250 PS 637: Performed by: PSYCHIATRY & NEUROLOGY

## 2021-11-22 PROCEDURE — 99232 SBSQ HOSP IP/OBS MODERATE 35: CPT | Performed by: PSYCHIATRY & NEUROLOGY

## 2021-11-22 PROCEDURE — 128N000001 HC R&B CD/MH ADULT

## 2021-11-22 RX ORDER — OLANZAPINE 10 MG/1
10 TABLET, ORALLY DISINTEGRATING ORAL AT BEDTIME
Status: DISCONTINUED | OUTPATIENT
Start: 2021-11-22 | End: 2021-11-26

## 2021-11-22 RX ADMIN — SITAGLIPTIN 100 MG: 25 TABLET, FILM COATED ORAL at 08:02

## 2021-11-22 RX ADMIN — ASPIRIN 81 MG: 81 TABLET, COATED ORAL at 08:02

## 2021-11-22 RX ADMIN — ROSUVASTATIN CALCIUM 5 MG: 5 TABLET, FILM COATED ORAL at 08:03

## 2021-11-22 RX ADMIN — LEVOTHYROXINE SODIUM 25 MCG: 25 TABLET ORAL at 06:43

## 2021-11-22 RX ADMIN — AMLODIPINE BESYLATE 5 MG: 5 TABLET ORAL at 08:03

## 2021-11-22 RX ADMIN — CARBOXYMETHYLCELLULOSE SODIUM 1 DROP: 0.5 SOLUTION/ DROPS OPHTHALMIC at 19:09

## 2021-11-22 RX ADMIN — CARBOXYMETHYLCELLULOSE SODIUM 1 DROP: 0.5 SOLUTION/ DROPS OPHTHALMIC at 11:33

## 2021-11-22 RX ADMIN — OLANZAPINE 10 MG: 10 TABLET, ORALLY DISINTEGRATING ORAL at 20:06

## 2021-11-22 ASSESSMENT — ACTIVITIES OF DAILY LIVING (ADL)
HYGIENE/GROOMING: INDEPENDENT
ORAL_HYGIENE: INDEPENDENT
ORAL_HYGIENE: INDEPENDENT
HYGIENE/GROOMING: INDEPENDENT
DRESS: STREET CLOTHES;SCRUBS (BEHAVIORAL HEALTH);INDEPENDENT
DRESS: SCRUBS (BEHAVIORAL HEALTH)

## 2021-11-22 NOTE — PLAN OF CARE
This patient remained calm and non-aggressive, slept all night without any distress. Safety checks were conducted every 15 minutes per unit policy to ensure her safety. No pain was reported and patient denied anxiety, depression and hallucination. BS was 142 mg/dl she got 1 U of coverage.

## 2021-11-22 NOTE — PROGRESS NOTES
"PSYCHIATRY  PROGRESS NOTE     DATE OF SERVICE   11/22/2021           CHIEF COMPLAINT   \"Someone wants to kill me and my family.\"       SUBJECTIVE   Nursing reports that patient slept all night without any distress. Patient remained calm and non-aggressive. Patient denied anxiety, depression and hallucinations.     SW met with patient this morning. Patient was pleasant on approach. Patient communicated to SW that she does not feel ready to discharge from hospital. Patient is afraid that her and her family will die if she is discharged. Discharge plan is to return to brother in law/sister's home, [facility] and psychiatry. Barriers to discharge include symptome stabilization, medication management and care coordination. Pending [facility] [place] psychiatric referral. Patient has declined to sign DANNIE at this time.        OBJECTIVE   Patient was seen and evaluated in the day area. The interview was conducted in [place]an by this writer. Writer is bilingual in [place] and there was no need for an  during the interview. Patient appeared calm, comfortable and cooperative during the interview. She was not wearing her sunglasses this morning. Patient reported that she sleep all night, but kept the door opened and lights on during the night. She feels rested this morning. Patient has been taking Zyprexa 7.5 mg, she feels comfortable with this dose and believes that it helps her sleep better. Patient reported feeling better since she started the increased dose of Zyprexa. Patient continues to be suspicious and paranoid. She reported that she is still getting threatening messages in salsa packets saying that someone will kill her. Patient stated that she is reminded everyday that her and her family will die. Patient reported not wanting to watch the TV anymore to avoid seeing messages on the TV. Patient has been reading \"Chicken soup for the soul\" and other books to keep herself busy to avoid thinking about those messages. " "Patient reported that yesterday her food and drink tasted like bleach. She is suspicious that someone is trying to poison her but still eats her food because she doubts that the hospital would do something like this to her because \"they would get in trouble\". Patient's sister visited her yesterday, patient feels happy about this visit and said that she will continue taking her meds because she made a promise to her sister, brother-in-law, and Dr. Winter.        MEDICATIONS   Medications:  Scheduled Meds:    amLODIPine  5 mg Oral Daily     aspirin  81 mg Oral Daily     insulin aspart  1-7 Units Subcutaneous TID AC     insulin glargine  10 Units Subcutaneous At Bedtime     levothyroxine  25 mcg Oral QAM AC     OLANZapine zydis  7.5 mg Oral At Bedtime     rosuvastatin  5 mg Oral Daily     sitagliptin  100 mg Oral Daily     Continuous Infusions:  PRN Meds:.acetaminophen, alum & mag hydroxide-simethicone, artificial tears ophthalmic solution, glucose **OR** dextrose **OR** glucagon, gabapentin, nicotine, OLANZapine **OR** OLANZapine, polyethylene glycol, traZODone    Medication adherence issues: MS Med Adherence Y/N: No  Medication side effects: MEDICATION SIDE EFFECTS: no side effects reported  Benefit: Yes / No: Yes       ROS   A comprehensive review of systems was negative.       MENTAL STATUS EXAM   Vitals: /61 (Patient Position: Sitting)   Pulse 74   Temp 98.4  F (36.9  C) (Oral)   Resp 18   Wt 54.2 kg (119 lb 9 oz)   SpO2 99%     Appearance:  No apparent distress  Mood:  {Mood: Fearful  Affect: appropriate  was congruent to speech  Suicidal Ideation: PRESENT / ABSENT: absent   Homicidal Ideation: PRESENT / ABSENT: absent   Thought process: perseverative on her delusion   Thought content:obsession and paranoid ideation   Fund of Knowledge: Average  Attention/Concentration: Normal  Language ability:  Intact  Memory:  Immediate recall intact  Insight:  struggles to maintain insight.  Judgement: " limited  Orientation: Yes, x4  Psychomotor Behavior: normal or unremarkable    Muscle Strength and Tone: MuscleStrength: Normal  Gait and Station: Normal       LABS   personally reviewed.     No results found for: PHENYTOIN, PHENOBARB, VALPROATE, CBMZ       DIAGNOSIS   Principal Problem:    Paranoid schizophrenia, chronic condition with acute exacerbation (H)    Active Problem List:  Patient Active Problem List   Diagnosis     Psychosis (H)     Paranoid schizophrenia, chronic condition with acute exacerbation (H)     Type 2 diabetes mellitus without complication, with long-term current use of insulin (H)     Acquired hypothyroidism     Hyperlipidemia LDL goal <130     Personal history of noncompliance with medical treatment, presenting hazards to health          PLAN   1. Ongoing education given regarding diagnostic and treatment options with risks, benefits and alternatives and adequate verbalization of understanding.  2. Increase Zyprexa 10 mg at bed time.  3. Medical team currently following the patient.  4. SW coordinating a safe discharge plan with the patient and her family.     Patient has been seen and evaluated in the day area by herself. I have discussed the patient with the PA student, I agree with her assessment and evaluation of the patient.    Risk Assessment: [facility] RISK ASSESSMENT: Patient able to contract for safety    Coordination of Care:   Treatment Plan reviewed and physician signed, Care discussed with Care/Treatment Team Members, Chart reviewed and Patient seen      Re-Certification I certify that the inpatient psychiatric facility services furnished since the previous certification were, and continue to be, medically necessary for, either, treatment which could reasonably be expected to improve the patient s condition or diagnostic study and that the hospital records indicate that the services furnished were, either, intensive treatment services, admission and related services necessary for  diagnostic study, or equivalent services.     I certify that the patient continues to need, on a daily basis, active treatment furnished directly by or requiring the supervision of inpatient psychiatric facility personnel.   I estimate 14 days of hospitalization is necessary for proper treatment of the patient. My plans for post-hospital care for this patient are  home with family     Teena Fabio    -     11/22/2021  -     12:32 PM    Total time  25 minutes with > 50%spent on coordination of cares and psycho-education.    This note was created with help of Dragon dictation system. Grammatical / typing errors are not intentional.    Teena Mccarthy

## 2021-11-22 NOTE — PROGRESS NOTES
"   11/22/21 1516   Engagement   Intervention Group   Topic Detail Bowling for gross motor movement, physical, emotional, and occupational wellness, and healthy leisure exploration   Attendance Attended   Patient Response Demonstrated understanding of materials provided;Was respectful;Positive attitude;Improved mood in response to group;Contributes to conversation;Expressed feelings/issues  (Pt shared that she had never bowled before but reported enjoying the activity. She was observed to be excited when she knocked many pins down (smiling and saying \"oh good!\"))   Concentrated on Task duration of group   Cognition Goal-directed;Follows through with task   Mood/Affect Pleasant   Social/Behavioral Engaged;Cooperative   Thought Content [place]   Supervision   Supervision On-site supervision provided     "

## 2021-11-22 NOTE — PLAN OF CARE
BEHAVIORAL TEAM DISCUSSION    Participants:   -Dr. Moy SOUZA  -Brielle Leigh RN  -Yumiko ESQUIVEL OT  -Flor TOLEDO PharmD     Progress: Improving   Anticipated length of stay: TBD   Continued Stay Criteria/Rationale: symptom stabilization, care-coordination, medication-management   Medical/Physical: Diabetes mellitus hypothyroidism hypothyroidism  Precautions:   Behavioral Orders   Procedures     Code 1 - Restrict to Unit     Routine Programming     As clinically indicated     Status 15     Every 15 minutes.     Plan: Return to brother in law/sisters home with psychiatry  Rationale for change in precautions or plan: No changes

## 2021-11-22 NOTE — PROGRESS NOTES
Patient  sitting at the lounge relaxing in the recliner when received alert and oriented X 4,  calm, and cooperative.  Most of the shift she was out in the milieu engaged with staff and peers. She denies all psych symptoms.  When asked about feeling to harm self or others she says no but will do so to defend erh self if anyone tries to harm her. Instructed to report any feelings to staff and not to harm anyone. BG checks 200 before super, coverage given and 206 at Hs. Pt denied pain and all psych symptoms and contracted for safety.

## 2021-11-22 NOTE — PROGRESS NOTES
Hospital medicine service chart check.  Diabetes management.  Blood sugars much improved.  Continue current DM regimen.      Medicine sign off.    Non Billable Visit    CHRIST Bateman, CNP  [facility] Medicine Service  [facility]

## 2021-11-22 NOTE — PLAN OF CARE
Problem: Behavioral Health Plan of Care  Goal: Plan of Care Review  Outcome: Improving  Flowsheets  Taken 11/22/2021 1614  Plan of Care Reviewed With: patient  Patient Agreement with Plan of Care: agrees  Taken 11/22/2021 1606  Plan of Care Reviewed With: patient  Patient Agreement with Plan of Care: agrees  Goal: Adheres to Safety Considerations for Self and Others  Outcome: Improving  Intervention: Develop and Maintain Individualized Safety Plan  Recent Flowsheet Documentation  Taken 11/22/2021 1606 by Favian Graham RN  Safety Measures: safety rounds completed  Goal: Optimized Coping Skills in Response to Life Stressors  Outcome: Improving     Problem: Behavior Regulation Impairment (Psychotic Signs/Symptoms)  Goal: Improved Behavioral Control (Psychotic Signs/Symptoms)  Outcome: Improving     Patient denies suicidal and homicidal ideation. She also denied depression but rated anxiety at 5/10. She endorsed visual and auditory hallucinations, saying people on television were threatening to kill her and her sister. She said that her sister is 10 years younger than her and she did not want her harmed. Patient verbalized that as much as she wanted to believe that the voices were just in her head, she had a difficult time doing so. She spent almost the whole time this shift out in the milieu and was engaged with peers and staff.

## 2021-11-22 NOTE — PROGRESS NOTES
11/22/21 1105   Engagement   Intervention Group   Topic Detail Open recreation for creative expression, leisure exploration, and coping with sx through distraction   Attendance Attended   Patient Response Demonstrated understanding of materials provided;Contributes to conversation;Asked questions and/or took notes;Was respectful;Positive attitude  (Pt finished her yarn basket and asked if she could work on a word search. She also requested two songs to listen to and sang along to one.)   Concentrated on Task duration of group   Cognition Goal-directed;Follows through with task   Mood/Affect Pleasant   Social/Behavioral Engaged;Cooperative   Thought Content [place]   Supervision   Supervision On-site supervision provided

## 2021-11-22 NOTE — PLAN OF CARE
"  Assessment/Intervention/Current Symtoms and Care Coordination: Writer met with pt on this date and consulted with psychiatrist. Pt was pleasant on approach, observed to be working on puzzle game. Writer initiated discussion surrounding discharge including referral to outpatient psychiatrist. Pt states that she is in agreement with this, but to declined to sign DANNIE at this time. Pt states that she does not feel ready to discharge from the hospital. Pt expressed paranoia stating that if she discharges she will die and her family will be attacked and die. Pt states that it is not safe for her to discharge from the hospital. Pt states that she has been receiving messages from the television stating that she is in danger and that someone is \"out to kill me\". Pt also states that she will not drink the water on the unit as she reports that \"it is not safe\". Pt states that she will only drink juice. Pt reports that she is eating her meals. Writer validated pt's concerns. Pt states that at this time, the only person that can help her is the police.     Discharge Plan or Goal Return to brother in law/sister's home, [facility] and psychiatry.     Barriers to Discharge Symptom stabilization medication management and care coordination     Referral Status pending psychiatry [facility]. Pt has declined to sign DANNIE at this time.      Pt Contacts:   brother in-law, Sarbjit Arriaza [phone]     Legal Status Voluntary      BRET Hernandez [facility], 11/22/2021, 10:42 AM   "

## 2021-11-22 NOTE — PLAN OF CARE
Problem: Behavioral Health Plan of Care  Goal: Plan of Care Review  Outcome: No Change  Flowsheets (Taken 11/21/2021 1638)  Plan of Care Reviewed With: patient     Problem: Behavioral Health Plan of Care  Goal: Patient-Specific Goal (Individualization)  Outcome: Improving  Goal: Absence of New-Onset Illness or Injury  Outcome: Improving  Intervention: Identify and Manage Fall Risk  Recent Flowsheet Documentation  Taken 11/21/2021 1638 by Marley Christie RN  Safety Measures: safety rounds completed  Goal: Optimized Coping Skills in Response to Life Stressors  Outcome: Improving     Problem: Mood Impairment (Psychotic Signs/Symptoms)  Goal: Improved Mood Symptoms (Psychotic Signs/Symptoms)  Outcome: Improving

## 2021-11-23 LAB
GLUCOSE BLDC GLUCOMTR-MCNC: 135 MG/DL (ref 70–99)
GLUCOSE BLDC GLUCOMTR-MCNC: 187 MG/DL (ref 70–99)
GLUCOSE BLDC GLUCOMTR-MCNC: 188 MG/DL (ref 70–99)
GLUCOSE BLDC GLUCOMTR-MCNC: 83 MG/DL (ref 70–99)

## 2021-11-23 PROCEDURE — 250N000013 HC RX MED GY IP 250 OP 250 PS 637: Performed by: NURSE PRACTITIONER

## 2021-11-23 PROCEDURE — 128N000001 HC R&B CD/MH ADULT

## 2021-11-23 PROCEDURE — 250N000013 HC RX MED GY IP 250 OP 250 PS 637: Performed by: PSYCHIATRY & NEUROLOGY

## 2021-11-23 PROCEDURE — 99232 SBSQ HOSP IP/OBS MODERATE 35: CPT | Performed by: PSYCHIATRY & NEUROLOGY

## 2021-11-23 RX ADMIN — OLANZAPINE 10 MG: 10 TABLET, ORALLY DISINTEGRATING ORAL at 20:27

## 2021-11-23 RX ADMIN — LEVOTHYROXINE SODIUM 25 MCG: 25 TABLET ORAL at 08:11

## 2021-11-23 RX ADMIN — AMLODIPINE BESYLATE 5 MG: 5 TABLET ORAL at 08:11

## 2021-11-23 RX ADMIN — SITAGLIPTIN 100 MG: 25 TABLET, FILM COATED ORAL at 08:11

## 2021-11-23 RX ADMIN — ROSUVASTATIN CALCIUM 5 MG: 5 TABLET, FILM COATED ORAL at 08:11

## 2021-11-23 RX ADMIN — CARBOXYMETHYLCELLULOSE SODIUM 1 DROP: 0.5 SOLUTION/ DROPS OPHTHALMIC at 18:43

## 2021-11-23 ASSESSMENT — ACTIVITIES OF DAILY LIVING (ADL)
DRESS: STREET CLOTHES;SCRUBS (BEHAVIORAL HEALTH)
ORAL_HYGIENE: INDEPENDENT
HYGIENE/GROOMING: HANDWASHING;INDEPENDENT
DRESS: SCRUBS (BEHAVIORAL HEALTH);INDEPENDENT
HYGIENE/GROOMING: INDEPENDENT
ORAL_HYGIENE: INDEPENDENT

## 2021-11-23 NOTE — PROGRESS NOTES
Assumed care of patient at 1130 pm, in bed sleeping without any s/s distress.Breathing easy,regular non-labored.No behavioral issues noted.No signs of SI,HI,SIB or psychosis noted.15 minute safety checks maintained as per policy.Slept all night and currently still sleeping.

## 2021-11-23 NOTE — PROGRESS NOTES
"PSYCHIATRY  PROGRESS NOTE     DATE OF SERVICE   11/23/2021       CHIEF COMPLAINT   \"Eight people will die.\"       SUBJECTIVE   Nursing reports patient slept through the night without any distress. No behavioral issues or psychosis noted.     SW reports that patient continues to express delusional belief that some is trying to kill her. RITCHIE will continue working with MD, patient and her family to coordinate a discharge plan. Discharge plan or goal is to return to brother in law/sister's home, [facility] and psychiatry. Barriers to discharge include symptom stabilization, medication management and care coordination. Pending psychiatry referral to [facility]. Patient has declined to sign DANNIE at this time.        OBJECTIVE   Patient was seen and evaluated in the day area. The interview was conducted in [place] citizen by this writer. Writer is bilingual in [place] citizen and there was no need for an  during the interview. Patient appeared calm, comfortable, and cooperative during the interview. Patient stated that she took her Zyprexa last night. Patient reported that she slept well and \"peacefully\" through the night without any issues. She kept the lights on and the door opened during the night. Patient feels rested this morning.   Linnette continues to be suspicious and paranoid. Patient wore her sunglasses during the interview. Patient reported that last night when the nurse took her blood pressure she saw a number 6 which represents that 6 people will die, including herself, her brother-in-law, her sister, and friends from [place]. Patient reported that this morning when the nurse scanned her wristband, she saw a number 8 which represents that more people are going to die. Patient has tried to avoid watching TV, but this morning she saw a message confirming that in fact 8 people will die. Patient reported feeling good taking her medication, she feels that she is improving, but the thoughts of someone wanting to kill " "her are still there. She would like to forget about this messages, but the TV, wristband scanner, BP machine, and salsa packets remind her continuously that her and her family will die. Patient communicated to the writer that she does not feel safe being discharged from the hospital. She would like to stay at the hospital until it is safe to go home. She stated that at her sister's house someone is watching and listening to them. Patient stated \"as soon as I get out of here, they are going to kill me\". Patient reported that she has always been loyal to her friends and family, and that she has helped them as much as she can, she does not understand why are people threatening to kill her.         MEDICATIONS   Medications:  Scheduled Meds:    amLODIPine  5 mg Oral Daily     aspirin  81 mg Oral Daily     insulin aspart  1-7 Units Subcutaneous TID AC     insulin glargine  10 Units Subcutaneous At Bedtime     levothyroxine  25 mcg Oral QAM AC     OLANZapine zydis  10 mg Oral At Bedtime     rosuvastatin  5 mg Oral Daily     sitagliptin  100 mg Oral Daily     Continuous Infusions:  PRN Meds:.acetaminophen, alum & mag hydroxide-simethicone, artificial tears ophthalmic solution, glucose **OR** dextrose **OR** glucagon, gabapentin, nicotine, OLANZapine **OR** OLANZapine, polyethylene glycol, traZODone    Medication adherence issues: MS Med Adherence Y/N: No  Medication side effects: MEDICATION SIDE EFFECTS: no side effects reported  Benefit: Yes / No: Yes       ROS   A comprehensive review of systems was negative.       MENTAL STATUS EXAM   Vitals: BP (!) 143/65   Pulse 77   Temp 97.8  F (36.6  C) (Oral)   Resp 16   Wt 54.2 kg (119 lb 9 oz)   SpO2 99%     Appearance:  No apparent distress  Mood:  {Mood: Fearful  Affect: appropriate  was congruent to speech  Suicidal Ideation: PRESENT / ABSENT: absent   Homicidal Ideation: PRESENT / ABSENT: absent   Thought process: perseverative on her delusion   Thought content: " obsession and paranoid ideation   Fund of Knowledge: Average  Attention/Concentration: Normal  Language ability:  Intact  Memory:  Immediate recall intact  Insight:  struggles to maintain insight.  Judgement: limited  Orientation: Yes, x4  Psychomotor Behavior: normal or unremarkable    Muscle Strength and Tone: MuscleStrength: Normal  Gait and Station: Normal       LABS   personally reviewed.     No results found for: PHENYTOIN, PHENOBARB, VALPROATE, CBMZ       DIAGNOSIS   Principal Problem:    Paranoid schizophrenia, chronic condition with acute exacerbation (H)    Active Problem List:  Patient Active Problem List   Diagnosis     Psychosis (H)     Paranoid schizophrenia, chronic condition with acute exacerbation (H)     Type 2 diabetes mellitus without complication, with long-term current use of insulin (H)     Acquired hypothyroidism     Hyperlipidemia LDL goal <130     Personal history of noncompliance with medical treatment, presenting hazards to health          PLAN   1. Ongoing education given regarding diagnostic and treatment options with risks, benefits and alternatives and adequate verbalization of understanding.  2. Zyprexa 10 mg at bedtime.   3. Medical team following patient.  4. SW coordinating a safe discharge plan with the patient and her family.     Patient was seen today.I have review with the PA student this case, I agree with her assessment and evaluation of the patient.     Risk Assessment: [facility] RISK ASSESSMENT: Patient able to contract for safety    Coordination of Care:   Treatment Plan reviewed and physician signed, Care discussed with Care/Treatment Team Members, Chart reviewed and Patient seen      Re-Certification I certify that the inpatient psychiatric facility services furnished since the previous certification were, and continue to be, medically necessary for, either, treatment which could reasonably be expected to improve the patient s condition or diagnostic study and that the  hospital records indicate that the services furnished were, either, intensive treatment services, admission and related services necessary for diagnostic study, or equivalent services.     I certify that the patient continues to need, on a daily basis, active treatment furnished directly by or requiring the supervision of inpatient psychiatric facility personnel.   I estimate 14 days of hospitalization is necessary for proper treatment of the patient. My plans for post-hospital care for this patient are  home with family     Teena Mccarthy    -     11/23/2021  -    9:58 AM  Total time  25 minutes with > 50%spent on coordination of cares and psycho-education.    This note was created with help of Dragon dictation system. Grammatical / typing errors are not intentional.    Teena Mccarthy

## 2021-11-23 NOTE — PROGRESS NOTES
11/23/21 1030   Engagement   Intervention Group   Topic Detail Social wellness activity (pham ante) for mood improvement, social skills, healthy leisure, and coping with symptoms through distraction.   Attendance Attended   Patient Response Demonstrated understanding of materials provided;Improved mood in response to group;Prosocial behavior;Was respectful;Contributes to conversation  (Pt shared many personal stories related to the questions (places she's traveled, what she likes to cook, etc.). She also cued peer when it was her turn to take a card. Pt reported enjoying the activity.)   Concentrated on Task duration of group   Cognition Goal-directed;Follows through with task  (Pt followed the directions and took or gave a pham when indicated without cues. Read a card when it was her turn without cueing.)   Mood/Affect Pleasant   Social/Behavioral Engaged;Cooperative   Thought Content Reality oriented   Supervision   Supervision On-site supervision provided

## 2021-11-23 NOTE — PLAN OF CARE
Problem: Behavior Regulation Impairment (Psychotic Signs/Symptoms)  Goal: Improved Behavioral Control (Psychotic Signs/Symptoms)  Outcome: No Change     Problem: Decreased Participation and Engagement (Psychotic Signs/Symptoms)  Goal: Increased Participation and Engagement (Psychotic Signs/Symptoms)  Outcome: No Change     Problem: Suicidal Behavior  Goal: Suicidal Behavior is Absent or Managed  Outcome: Improving    Patient denies pain/discomfort this shift. Patient endorsed auditory hallucinations and denied all other psych symptoms. Mood calm and cooperative. Med complaint. Visible in the milieu socializing with peers. Blood sugar checks with insulin coverage per orders. Contracts for safety.

## 2021-11-23 NOTE — PROGRESS NOTES
"   11/23/21 1542   Engagement   Intervention Group   Topic Detail tile boxes for sequencing, leisure, relaxation, socializing   Attendance Attended   Patient Response Demonstrated understanding of materials provided;Was respectful;Positive attitude;Improved mood in response to group;Contributes to conversation;Prosocial behavior  (Pt knew how to sand and stain the wood box. She took turns with her peer picking out songs and would say \"ok your turn now\". Discussed music with her peer and they shared their favorite songs.)   Concentrated on Task duration of group   Cognition Goal-directed;Takes initiative;Follows through with task;Sequences task   Mood/Affect Pleasant   Social/Behavioral Engaged;Cooperative   Thought Content [place]   Supervision   Supervision On-site supervision provided     "

## 2021-11-23 NOTE — PLAN OF CARE
Assessment/Intervention/Current Symtoms and Care Coordination: Writer met with pt on this date and consulted with psychiatrist. Pt was pleasant on approach and cooperative. Pt continues to express delusional belief that other are trying to kill her. Pt states that she believes that eight people will die if she were to go home. Pt states that when the nurse scanned her wristband, she saw the number eight and states that this is the reason she know that eight people will die. Pt states that she is afraid and will not leave her home. Pt states that psychiatrist increased her medications so she is hopeful that this will help address paranoia. Pt states that from 1998 to October of 2021, she was stable and did not experience any paranoia or have beliefs that people were trying to harm her. During conversation, pt denies SI/HI/SIB.     Discharge Plan or Goal Return to brother in law/sister's home, [facility] and psychiatry.     Barriers to Discharge Symptom stabilization medication management and care coordination     Referral Status pending psychiatry [facility]. Pt has declined to sign DANNIE at this time.      Pt Contacts:   brother in-lawSarbjit [phone]     Legal Status Voluntary      BRET Hernandez [facility], 11/23/2021, 9:27 AM

## 2021-11-24 LAB
GLUCOSE BLDC GLUCOMTR-MCNC: 142 MG/DL (ref 70–99)
GLUCOSE BLDC GLUCOMTR-MCNC: 171 MG/DL (ref 70–99)
GLUCOSE BLDC GLUCOMTR-MCNC: 256 MG/DL (ref 70–99)
GLUCOSE BLDC GLUCOMTR-MCNC: 78 MG/DL (ref 70–99)
SARS-COV-2 RNA RESP QL NAA+PROBE: NEGATIVE

## 2021-11-24 PROCEDURE — 250N000013 HC RX MED GY IP 250 OP 250 PS 637: Performed by: NURSE PRACTITIONER

## 2021-11-24 PROCEDURE — 87635 SARS-COV-2 COVID-19 AMP PRB: CPT | Performed by: PSYCHIATRY & NEUROLOGY

## 2021-11-24 PROCEDURE — 90853 GROUP PSYCHOTHERAPY: CPT

## 2021-11-24 PROCEDURE — 99232 SBSQ HOSP IP/OBS MODERATE 35: CPT | Performed by: PSYCHIATRY & NEUROLOGY

## 2021-11-24 PROCEDURE — 128N000001 HC R&B CD/MH ADULT

## 2021-11-24 PROCEDURE — 250N000013 HC RX MED GY IP 250 OP 250 PS 637: Performed by: PSYCHIATRY & NEUROLOGY

## 2021-11-24 RX ADMIN — AMLODIPINE BESYLATE 5 MG: 5 TABLET ORAL at 08:33

## 2021-11-24 RX ADMIN — ROSUVASTATIN CALCIUM 5 MG: 5 TABLET, FILM COATED ORAL at 08:33

## 2021-11-24 RX ADMIN — ASPIRIN 81 MG: 81 TABLET, COATED ORAL at 08:33

## 2021-11-24 RX ADMIN — SITAGLIPTIN 100 MG: 25 TABLET, FILM COATED ORAL at 08:33

## 2021-11-24 RX ADMIN — OLANZAPINE 10 MG: 10 TABLET, ORALLY DISINTEGRATING ORAL at 20:12

## 2021-11-24 RX ADMIN — LEVOTHYROXINE SODIUM 25 MCG: 25 TABLET ORAL at 06:35

## 2021-11-24 RX ADMIN — CARBOXYMETHYLCELLULOSE SODIUM 1 DROP: 0.5 SOLUTION/ DROPS OPHTHALMIC at 11:51

## 2021-11-24 ASSESSMENT — ACTIVITIES OF DAILY LIVING (ADL)
DRESS: INDEPENDENT
DRESS: SCRUBS (BEHAVIORAL HEALTH)
HYGIENE/GROOMING: HANDWASHING;INDEPENDENT
ORAL_HYGIENE: INDEPENDENT
LAUNDRY: UNABLE TO COMPLETE
ORAL_HYGIENE: INDEPENDENT

## 2021-11-24 NOTE — PROGRESS NOTES
"PSYCHIATRY  PROGRESS NOTE     DATE OF SERVICE   11/24/2021           CHIEF COMPLAINT   \"Someone wants to kill me and my family.\"       SUBJECTIVE   Nursing reports: patient had more than 6 hours of uninterrupted sleep. Patient slept with the door opened and the light on throughout the night. No concerns were noted during the shift.      reports: SW will continue working with MD, patient and her family to coordinate a safe discharge plan. Discharge plan or goal is to return to brother in law/sister's home, [facility] and psychiatry.        OBJECTIVE   Patient was seen and evaluated by writer in the day area. The interview was conducted in [place] by this writer. Writer is bilingual in [place] and there was no need for an  during the interview. Patient appears calm, comfortable and cooperative during the interview. Patient reports that she had a good sleep last night. Patient slept with the light on and the door opened as requested by her. Patient feels well rested and relaxed this morning. She has noticed that Zyprexa is helping her sleep better. Patient remains paranoid and reported seeing messages yesterday telling her that \"eight people will die\" including her sister, brother-in-law, friends from [place], and herself. Patient expresses that she is concerned about the safety of her family and friends. Patient has been attending groups, talking to people, and reading books to keep her mind busy. This has helped her to relax and laugh and forget about those messages. Patient states that thinking about the messages makes her feel \"a little anxious and scared\". Patient denies hallucinations, depression, and thoughts of harming other or herself.        MEDICATIONS   Medications:  Scheduled Meds:    amLODIPine  5 mg Oral Daily     aspirin  81 mg Oral Daily     insulin aspart  1-7 Units Subcutaneous TID AC     insulin glargine  10 Units Subcutaneous At Bedtime     levothyroxine  25 mcg Oral QAM AC     " OLANZapine zydis  10 mg Oral At Bedtime     rosuvastatin  5 mg Oral Daily     sitagliptin  100 mg Oral Daily     Continuous Infusions:  PRN Meds:.acetaminophen, alum & mag hydroxide-simethicone, artificial tears ophthalmic solution, glucose **OR** dextrose **OR** glucagon, gabapentin, nicotine, OLANZapine **OR** OLANZapine, polyethylene glycol, traZODone    Medication adherence issues: MS Med Adherence Y/N: No  Medication side effects: MEDICATION SIDE EFFECTS: no side effects reported  Benefit: Yes / No: Yes       ROS   A comprehensive review of systems was negative.       MENTAL STATUS EXAM   Vitals: /50   Pulse 79   Temp 97.9  F (36.6  C) (Oral)   Resp 14   Wt 54.2 kg (119 lb 9 oz)   SpO2 96%     Appearance:  No apparent distress  Mood:  {Mood: Fearful  Affect: appropriate  was congruent to speech  Suicidal Ideation: PRESENT / ABSENT: absent   Homicidal Ideation: PRESENT / ABSENT: absent   Thought process: perseverative on delusions   Thought content: obsession and paranoid ideation  Fund of Knowledge: Average  Attention/Concentration: Normal  Language ability:  Intact  Memory:  Immediate recall intact  Insight:  struggles to maintain insight.  Judgement: limited  Orientation: Yes, x4  Psychomotor Behavior: normal or unremarkable    Muscle Strength and Tone: MuscleStrength: Normal  Gait and Station: Normal       LABS   personally reviewed.     No results found for: PHENYTOIN, PHENOBARB, VALPROATE, CBMZ       DIAGNOSIS   Principal Problem:    Paranoid schizophrenia, chronic condition with acute exacerbation (H)    Active Problem List:  Patient Active Problem List   Diagnosis     Psychosis (H)     Paranoid schizophrenia, chronic condition with acute exacerbation (H)     Type 2 diabetes mellitus without complication, with long-term current use of insulin (H)     Acquired hypothyroidism     Hyperlipidemia LDL goal <130     Personal history of noncompliance with medical treatment, presenting hazards to  health          PLAN   1. Ongoing education given regarding diagnostic and treatment options with risks, benefits and alternatives and adequate verbalization of understanding.  2. Zyprexa 10 mg at bedtime  3. Medical team currently following the patient  4.  coordinating a safe discharge plan with the patient and her family    Today patient has been seen and evaluated.  I have discussed the patient with the physician assistant student, I agree with her assessment and evaluation of the patient.    Risk Assessment: [facility] RISK ASSESSMENT: Patient able to contract for safety    Coordination of Care:   Treatment Plan reviewed and physician signed, Care discussed with Care/Treatment Team Members, Chart reviewed and Patient seen      Re-Certification I certify that the inpatient psychiatric facility services furnished since the previous certification were, and continue to be, medically necessary for, either, treatment which could reasonably be expected to improve the patient s condition or diagnostic study and that the hospital records indicate that the services furnished were, either, intensive treatment services, admission and related services necessary for diagnostic study, or equivalent services.     I certify that the patient continues to need, on a daily basis, active treatment furnished directly by or requiring the supervision of inpatient psychiatric facility personnel.   I estimate 14 days of hospitalization is necessary for proper treatment of the patient. My plans for post-hospital care for this patient are  home with family     Teena Fabio    -     11/24/2021  -     10:45 AM     Total time  25 minutes with > 50%spent on coordination of cares and psycho-education.    This note was created with help of Dragon dictation system. Grammatical / typing errors are not intentional.    Teena Mccarthy

## 2021-11-24 NOTE — PLAN OF CARE
Problem: Behavioral Health Plan of Care  Goal: Plan of Care Review  Outcome: Improving  Flowsheets (Taken 11/24/2021 0930)  Plan of Care Reviewed With: patient  Patient Agreement with Plan of Care: agrees  Goal: Patient-Specific Goal (Individualization)  Outcome: Improving  Note:     Goal: Adheres to Safety Considerations for Self and Others  Outcome: Improving  Intervention: Develop and Maintain Individualized Safety Plan  Recent Flowsheet Documentation  Taken 11/24/2021 0930 by Shruti Vazquez RN  Safety Measures:    environmental rounds completed    safety rounds completed  Goal: Absence of New-Onset Illness or Injury  Outcome: Improving  Intervention: Identify and Manage Fall Risk  Recent Flowsheet Documentation  Taken 11/24/2021 0930 by Shruti Vazquez RN  Safety Measures:    environmental rounds completed    safety rounds completed  Goal: Optimized Coping Skills in Response to Life Stressors  Outcome: Improving  Goal: Develops/Participates in Therapeutic [place] to Support Successful Transition  Outcome: Improving  Intervention: Foster Therapeutic [place]  Recent Flowsheet Documentation  Taken 11/24/2021 0930 by Shruti Vazquez RN  Trust Relationship/Rapport:    care explained    choices provided    questions answered    thoughts/feelings acknowledged     Problem: Behavior Regulation Impairment (Psychotic Signs/Symptoms)  Goal: Improved Behavioral Control (Psychotic Signs/Symptoms)  Outcome: Improving     Problem: Cognitive Impairment (Psychotic Signs/Symptoms)  Goal: Optimal Cognitive Function (Psychotic Signs/Symptoms)  Outcome: Improving  Intervention: Support and Promote Cognitive Ability  Recent Flowsheet Documentation  Taken 11/24/2021 0930 by Shruti Vazquez RN  Trust Relationship/Rapport:    care explained    choices provided    questions answered    thoughts/feelings acknowledged     Problem: Decreased Participation and Engagement (Psychotic Signs/Symptoms)  Goal: Increased Participation and  Engagement (Psychotic Signs/Symptoms)  Outcome: Improving  Intervention: Facilitate Participation and Engagement  Recent Flowsheet Documentation  Taken 11/24/2021 0930 by Shruti Vazquez RN  Diversional Activity: television     Problem: Mood Impairment (Psychotic Signs/Symptoms)  Goal: Improved Mood Symptoms (Psychotic Signs/Symptoms)  Outcome: Improving  Intervention: Optimize Emotion and Mood  Recent Flowsheet Documentation  Taken 11/24/2021 0930 by Shruti Vazquez RN  Diversional Activity: television     Problem: Psychomotor Impairment (Psychotic Signs/Symptoms)  Goal: Improved Psychomotor Symptoms (Psychotic Signs/Symptoms)  Outcome: Improving  Intervention: Manage Psychomotor Movement  Recent Flowsheet Documentation  Taken 11/24/2021 0930 by Shruti Vazquez RN  Diversional Activity: television     Problem: Sensory Perception Impairment (Psychotic Signs/Symptoms)  Goal: Decreased Sensory Symptoms (Psychotic Signs/Symptoms)  Outcome: Improving     Problem: Sleep Disturbance (Psychotic Signs/Symptoms)  Goal: Improved Sleep (Psychotic Signs/Symptoms)  Outcome: Improving     Problem: Social, Occupational or Functional Impairment (Psychotic Signs/Symptoms)  Goal: Enhanced Social, Occupational or Functional Skills (Psychotic Signs/Symptoms)  Outcome: Improving  Intervention: Promote Social, Occupational and Functional Ability  Recent Flowsheet Documentation  Taken 11/24/2021 0930 by Shruti Vazquez RN  Trust Relationship/Rapport:    care explained    choices provided    questions answered    thoughts/feelings acknowledged     Problem: Sleep Disturbance  Goal: Adequate Sleep/Rest  Outcome: Improving     Problem: Suicidal Behavior  Goal: Suicidal Behavior is Absent or Managed  Outcome: Improving     Alert and oriented x3, able to communicate needs and feelings. Pleasant, cooperative, compliant with medication. Visible on unit, social and engaged in milieu. Denied anxiety or depression, denied SI/HI, hallucinations  and other psychs symptoms, contracted for safety. Denied pain or discomfort. Refused Covid test BG 78 in the morning ad 142 with lunch.

## 2021-11-24 NOTE — PLAN OF CARE
"  Assessment/Intervention/Current Symtoms and Care Coordination: Writer met with pt on this date and consulted with psychiatrist. Pt continues to endorse the belief that people are trying to kill her. Pt continues to states that 8 people will die if she discharges from the hospital. Pt does state that she has been more paranoid that baseline. Pt denies safety concerns and continues to report that she feels safe in the hospital. Pt agrees that her sleep is improved. Pt states that the cameras on the unit \"make me feel safe\".     Writer called and spoke to pt's brother in law- Sarbjit #[phone] and provided update on pt's status. Sarbjit agrees that pt continues to be symptomatic, but is expressing delusional beliefs less. There is no discharge date set at this time.       Discharge Plan or Goal Return to brother in law/sister's home, [facility] and psychiatry.     Barriers to Discharge Symptom stabilization medication management and care coordination     Referral Status pending psychiatry [facility]. Pt has declined to sign DANNIE at this time.      Pt Contacts:   brother in-lawSarbjit Arsalan [phone]     Legal Status Voluntary      BRET Hernandez [facility], 11/24/2021, 11:29 AM   "

## 2021-11-24 NOTE — PROVIDER NOTIFICATION
11/24/21 1300   Engagement   Intervention Group   Topic Detail SW: Process Group   Attendance Attended   Patient Response Demonstrated understanding of materials provided   Concentrated on Task 30 - 45 min   Cognition Goal-directed   Mood/Affect Bright;Congruent   Social/Behavioral Cooperative;Engaged   Thought Content Reality oriented     GROUP LENGTH (MINS):      RELEVANT PRIMARY DIAGNOSIS: Patient Active Problem List   Diagnosis     Psychosis (H)     Paranoid schizophrenia, chronic condition with acute exacerbation (H)     Type 2 diabetes mellitus without complication, with long-term current use of insulin (H)     Acquired hypothyroidism     Hyperlipidemia LDL goal <130     Personal history of noncompliance with medical treatment, presenting hazards to health        TREATMENT MODALITY:      [x]CBT       []DBT       []ACT       []Interpersonal psychotherapy                                 []Psychoeducational therapy       []Skill development       []Other:        ATTENDANCE:        [x]Stayed for entire group     []Arrived late    [] Left early       # OF PATIENTS IN GROUP: 4   BILLABLE:     [x]Yes            []No   Notes:

## 2021-11-24 NOTE — PLAN OF CARE
Problem: Behavioral Health Plan of Care  Goal: Adheres to Safety Considerations for Self and Others  Intervention: Develop and Maintain Individualized Safety Plan  Recent Flowsheet Documentation  Taken 11/24/2021 4832 by Taylor Muniz, RN  Safety Measures:    environmental rounds completed    safety rounds completed     Problem: Sleep Disturbance  Goal: Adequate Sleep/Rest  Outcome: Improving      Patient had more than 6 hrs of uninterrupted sleep, with door left opened and light on throughout the night. Safety checks completed per unit policy. No concerns noted/reported. Will continued to monitor.

## 2021-11-24 NOTE — PROGRESS NOTES
"   11/24/21 1521   Engagement   Intervention Group   Topic Detail Creative endeavor for healthy leisure, socialization, and distraction through symptom management   Attendance Attended   Patient Response Demonstrated understanding of materials provided;Was respectful;Contributes to conversation;Asked questions and/or took notes  (When asked if there was a song pt wanted to listen to she said \"let others pick out a song then come back to me\". Pt asked if she would be able to finish in the next group and writer reassured her she could on Friday.)   Concentrated on Task duration of group   Cognition Goal-directed;Follows through with task;Attends to detail  (Pt took her time creating a design for the top of her tile box and finding the right tiles.)   Mood/Affect Pleasant   Social/Behavioral Engaged;Cooperative   Thought Content Reality oriented   Supervision   Supervision On-site supervision provided     "

## 2021-11-24 NOTE — PROGRESS NOTES
"   11/24/21 1042   Engagement   Intervention Group   Topic Detail Pictionary for concentration, healthy leisure exploration, coping, social engagement, and organization   Attendance Attended   Patient Response Demonstrated understanding of materials provided;Was respectful;Positive attitude;Contributes to conversation  (Pt would frequently look at the pictionary card and say \"I can't draw any of these\", and required assistance from writer to find a word she could draw. She complimented peers saying \"that was a good drawing\". Pt also consistently guessed peers' drawings.)   Concentrated on Task 30 - 45 min  (Pt left with 5 minutes left in group to speak with the Dr. Winter)   Cognition Goal-directed;Sequences task;Follows through with task   Mood/Affect Pleasant   Social/Behavioral Engaged;Cooperative   Thought Content Reality oriented   Supervision   Supervision On-site supervision provided     "

## 2021-11-24 NOTE — PLAN OF CARE
BEHAVIORAL TEAM DISCUSSION    Participants:  -Dr. Moy SOUZA  -Jean Carlos Leigh RN  -Yumiko ESQUIVEL OT  -Flor TOLEDO PharmD     Progress: Improving   Anticipated length of stay: TBD   Continued Stay Criteria/Rationale: symptom stabilization, care-coordination, medication-management   Medical/Physical: Diabetes mellitus hypothyroidism     Precautions:   Behavioral Orders   Procedures     Code 1 - Restrict to Unit     Routine Programming     As clinically indicated     Status 15     Every 15 minutes.     Plan: Return to brother in law/sisters home with psychiatry  Rationale for change in precautions or plan: No changes

## 2021-11-24 NOTE — PLAN OF CARE
Alert and oriented x3, able to communicate needs and feelings. Pleasant, cooperative, compliant with medication. Visible on unit, social and engaged in milieu. Denied anxiety or depression, no SI/HI, contracted for safety. Denied hallucinations or delusions. Denied pain or discomfort.   Refused Covid test  Problem: Behavioral Health Plan of Care  Goal: Absence of New-Onset Illness or Injury  Outcome: Improving  Intervention: Identify and Manage Fall Risk  Recent Flowsheet Documentation  Taken 11/23/2021 1617 by Javed Murguia RN  Safety Measures:    safety rounds completed    environmental rounds completed  Goal: Optimized Coping Skills in Response to Life Stressors  Outcome: Improving  Goal: Develops/Participates in Therapeutic [place] to Support Successful Transition  Outcome: Improving     Problem: Behavior Regulation Impairment (Psychotic Signs/Symptoms)  Goal: Improved Behavioral Control (Psychotic Signs/Symptoms)  Outcome: Improving  Flowsheets (Taken 11/23/2021 1758)  Mutually Determined Action Steps (Improved Behavioral Control): other (see comments)     Problem: Cognitive Impairment (Psychotic Signs/Symptoms)  Goal: Optimal Cognitive Function (Psychotic Signs/Symptoms)  Outcome: Improving  Flowsheets (Taken 11/23/2021 1758)  Mutually Determined Action Steps (Optimal Cognitive Function):    remains focused during activity    follows step-by-step instructions     Problem: Suicidal Behavior  Goal: Suicidal Behavior is Absent or Managed  Outcome: Improving  Flowsheets (Taken 11/23/2021 1758)  Mutually Determined Action Steps (Suicidal Behavior Absent/Managed):    verbalizes safety check rationale    shares suicidal thoughts     Problem: Behavioral Health Plan of Care  Goal: Plan of Care Review  Recent Flowsheet Documentation  Taken 11/23/2021 1617 by Javed Murguia, RN  Plan of Care Reviewed With: patient  Patient Agreement with Plan of Care: agrees  Goal: Adheres to Safety Considerations for Self and  Others  Intervention: Develop and Maintain Individualized Safety Plan  Recent Flowsheet Documentation  Taken 11/23/2021 1617 by Javed Murguia RN  Safety Measures:    safety rounds completed    environmental rounds completed     Problem: Decreased Participation and Engagement (Psychotic Signs/Symptoms)  Goal: Increased Participation and Engagement (Psychotic Signs/Symptoms)  Intervention: Facilitate Participation and Engagement  Recent Flowsheet Documentation  Taken 11/23/2021 1617 by Javed Murguia RN  Diversional Activity: television     Problem: Mood Impairment (Psychotic Signs/Symptoms)  Goal: Improved Mood Symptoms (Psychotic Signs/Symptoms)  Intervention: Optimize Emotion and Mood  Recent Flowsheet Documentation  Taken 11/23/2021 1617 by Javed Murguia RN  Diversional Activity: television     Problem: Psychomotor Impairment (Psychotic Signs/Symptoms)  Goal: Improved Psychomotor Symptoms (Psychotic Signs/Symptoms)  Intervention: Manage Psychomotor Movement  Recent Flowsheet Documentation  Taken 11/23/2021 1617 by Javed Murguia RN  Diversional Activity: television

## 2021-11-25 LAB
GLUCOSE BLDC GLUCOMTR-MCNC: 114 MG/DL (ref 70–99)
GLUCOSE BLDC GLUCOMTR-MCNC: 151 MG/DL (ref 70–99)
GLUCOSE BLDC GLUCOMTR-MCNC: 174 MG/DL (ref 70–99)
GLUCOSE BLDC GLUCOMTR-MCNC: 82 MG/DL (ref 70–99)

## 2021-11-25 PROCEDURE — 250N000013 HC RX MED GY IP 250 OP 250 PS 637: Performed by: PSYCHIATRY & NEUROLOGY

## 2021-11-25 PROCEDURE — 90853 GROUP PSYCHOTHERAPY: CPT

## 2021-11-25 PROCEDURE — 128N000001 HC R&B CD/MH ADULT

## 2021-11-25 PROCEDURE — 250N000013 HC RX MED GY IP 250 OP 250 PS 637: Performed by: NURSE PRACTITIONER

## 2021-11-25 RX ADMIN — ASPIRIN 81 MG: 81 TABLET, COATED ORAL at 08:28

## 2021-11-25 RX ADMIN — ROSUVASTATIN CALCIUM 5 MG: 5 TABLET, FILM COATED ORAL at 08:27

## 2021-11-25 RX ADMIN — OLANZAPINE 10 MG: 10 TABLET, ORALLY DISINTEGRATING ORAL at 20:14

## 2021-11-25 RX ADMIN — LEVOTHYROXINE SODIUM 25 MCG: 25 TABLET ORAL at 06:54

## 2021-11-25 RX ADMIN — SITAGLIPTIN 100 MG: 25 TABLET, FILM COATED ORAL at 08:27

## 2021-11-25 RX ADMIN — CARBOXYMETHYLCELLULOSE SODIUM 1 DROP: 0.5 SOLUTION/ DROPS OPHTHALMIC at 21:02

## 2021-11-25 ASSESSMENT — ACTIVITIES OF DAILY LIVING (ADL)
DRESS: SCRUBS (BEHAVIORAL HEALTH)
DRESS: SCRUBS (BEHAVIORAL HEALTH);INDEPENDENT
HYGIENE/GROOMING: INDEPENDENT
ORAL_HYGIENE: INDEPENDENT
LAUNDRY: UNABLE TO COMPLETE
ORAL_HYGIENE: INDEPENDENT
HYGIENE/GROOMING: HANDWASHING;INDEPENDENT

## 2021-11-25 NOTE — PROVIDER NOTIFICATION
11/25/21 1000   Engagement   Intervention Group   Topic Detail SW: Process Group   Attendance Attended   Patient Response Demonstrated understanding of materials provided   Concentrated on Task duration of group   Cognition Goal-directed;Follows through with task   Mood/Affect Pleasant   Social/Behavioral Cooperative;Engaged   Thought Content Reality oriented     GROUP LENGTH (MINS):      RELEVANT PRIMARY DIAGNOSIS: Patient Active Problem List   Diagnosis     Psychosis (H)     Paranoid schizophrenia, chronic condition with acute exacerbation (H)     Type 2 diabetes mellitus without complication, with long-term current use of insulin (H)     Acquired hypothyroidism     Hyperlipidemia LDL goal <130     Personal history of noncompliance with medical treatment, presenting hazards to health        TREATMENT MODALITY:      [x]CBT       []DBT       []ACT       []Interpersonal psychotherapy                                 [x]Psychoeducational therapy       []Skill development       []Other:        ATTENDANCE:        [x]Stayed for entire group     []Arrived late    [] Left early       # OF PATIENTS IN GROUP: 6   BILLABLE:     [x]Yes            []No   Notes:   35m

## 2021-11-25 NOTE — PLAN OF CARE
Problem: Behavioral Health Plan of Care  Goal: Plan of Care Review  Outcome: Improving  Flowsheets  Taken 11/25/2021 1052  Progress: improving  Taken 11/25/2021 0827  Plan of Care Reviewed With: patient  Patient Agreement with Plan of Care: agrees  Note: Patient participates in treatment  Goal: Adheres to Safety Considerations for Self and Others  Intervention: Develop and Maintain Individualized Safety Plan  Recent Flowsheet Documentation  Taken 11/25/2021 0827 by Gita Reardon RN  Safety Measures:    environmental rounds completed    safety rounds completed  Goal: Absence of New-Onset Illness or Injury  Intervention: Identify and Manage Fall Risk  Recent Flowsheet Documentation  Taken 11/25/2021 0827 by Gita Readron RN  Safety Measures:    environmental rounds completed    safety rounds completed  Goal: Optimized Coping Skills in Response to Life Stressors  Outcome: No Change  Goal: Develops/Participates in Therapeutic [place] to Support Successful Transition  Outcome: Improving  Intervention: Foster Therapeutic [place]  Recent Flowsheet Documentation  Taken 11/25/2021 0827 by Gita Reardon RN  Trust Relationship/Rapport:    care explained    choices provided    questions answered    thoughts/feelings acknowledged   Patient was engaged, out in the milieu and attended groups.  Patient denied all psych symptoms and contracted for safety.  Patient is mask compliant and uses sunglasses while out in the milieu.  Patient was concerned this morning that she woke up and her mask was not on her face, but on the side of her pillow.  Patient insisted that someone took her mask off in the middle of the night.  Patient has blood sugar checks and receives sliding scale insulin as needed.

## 2021-11-25 NOTE — PLAN OF CARE
Problem: Behavioral Health Plan of Care  Goal: Absence of New-Onset Illness or Injury  Outcome: Improving     Problem: Behavior Regulation Impairment (Psychotic Signs/Symptoms)  Goal: Improved Behavioral Control (Psychotic Signs/Symptoms)  Outcome: Improving     Problem: Sleep Disturbance (Psychotic Signs/Symptoms)  Goal: Improved Sleep (Psychotic Signs/Symptoms)  Outcome: Improving   Pt appeared sleeping for more than seven hours, normal breathing pattern and no behavior observed or reported  during the night shift and 15 minutes checks completed per protocol.

## 2021-11-25 NOTE — PLAN OF CARE
Pleasant, cooperative and compliant with medication. Visible on unit, social and interactive with peers and staff members. She denied any pain or discomfort, continues to be independent with all ADLs. Rated anxiety and depression at 0, no SI/HI, contracted for safety. Denied hallucination or delusion.  at supper, 256 at HS.  Problem: Behavioral Health Plan of Care  Goal: Adheres to Safety Considerations for Self and Others  Outcome: Improving  Goal: Absence of New-Onset Illness or Injury  Outcome: Improving  Goal: Optimized Coping Skills in Response to Life Stressors  Outcome: Improving  Goal: Develops/Participates in Therapeutic [place] to Support Successful Transition  Outcome: Improving     Problem: Mood Impairment (Psychotic Signs/Symptoms)  Goal: Improved Mood Symptoms (Psychotic Signs/Symptoms)  Outcome: Improving  Flowsheets (Taken 11/24/2021 1802)  Mutually Determined Action Steps (Improved Mood Symptoms): verbalizes increased insight     Problem: Suicidal Behavior  Goal: Suicidal Behavior is Absent or Managed  Outcome: Improving  Flowsheets (Taken 11/24/2021 1802)  Mutually Determined Action Steps (Suicidal Behavior Absent/Managed):    verbalizes safety check rationale    shares suicidal thoughts     Problem: Behavioral Health Plan of Care  Goal: Plan of Care Review  Recent Flowsheet Documentation  Taken 11/24/2021 1700 by Javed Murguia, RN  Plan of Care Reviewed With: patient  Patient Agreement with Plan of Care: agrees

## 2021-11-25 NOTE — PROGRESS NOTES
11/25/21 1400   Engagement   Intervention Group   Topic Detail OT: Kareen [place] to promote coping through distraction, healthy liesure, increased concentration, sequencing, direction following, and socialization   Attendance Attended   Patient Response Demonstrated understanding of materials provided;Was respectful;Positive attitude   Concentrated on Task duration of group   Cognition Goal-directed;Follows through with task   Mood/Affect Content;Pleasant   Social/Behavioral Cooperative;Engaged   Thought Content Reality oriented   Goals addressed in session today Pt participated actively in group, required repetition of information at times though likely due to language versus cognition/concentration. Pleasant in interactions.

## 2021-11-26 LAB
GLUCOSE BLDC GLUCOMTR-MCNC: 136 MG/DL (ref 70–99)
GLUCOSE BLDC GLUCOMTR-MCNC: 149 MG/DL (ref 70–99)
GLUCOSE BLDC GLUCOMTR-MCNC: 223 MG/DL (ref 70–99)
GLUCOSE BLDC GLUCOMTR-MCNC: 88 MG/DL (ref 70–99)

## 2021-11-26 PROCEDURE — 250N000013 HC RX MED GY IP 250 OP 250 PS 637: Performed by: NURSE PRACTITIONER

## 2021-11-26 PROCEDURE — 250N000013 HC RX MED GY IP 250 OP 250 PS 637: Performed by: PSYCHIATRY & NEUROLOGY

## 2021-11-26 PROCEDURE — 128N000001 HC R&B CD/MH ADULT

## 2021-11-26 PROCEDURE — 99232 SBSQ HOSP IP/OBS MODERATE 35: CPT | Performed by: PSYCHIATRY & NEUROLOGY

## 2021-11-26 RX ADMIN — AMLODIPINE BESYLATE 5 MG: 5 TABLET ORAL at 08:33

## 2021-11-26 RX ADMIN — CARBOXYMETHYLCELLULOSE SODIUM 1 DROP: 0.5 SOLUTION/ DROPS OPHTHALMIC at 18:46

## 2021-11-26 RX ADMIN — OLANZAPINE 12.5 MG: 10 TABLET, ORALLY DISINTEGRATING ORAL at 20:36

## 2021-11-26 RX ADMIN — LEVOTHYROXINE SODIUM 25 MCG: 25 TABLET ORAL at 06:38

## 2021-11-26 RX ADMIN — ASPIRIN 81 MG: 81 TABLET, COATED ORAL at 08:32

## 2021-11-26 RX ADMIN — SITAGLIPTIN 100 MG: 25 TABLET, FILM COATED ORAL at 08:32

## 2021-11-26 RX ADMIN — ROSUVASTATIN CALCIUM 5 MG: 5 TABLET, FILM COATED ORAL at 08:32

## 2021-11-26 ASSESSMENT — ACTIVITIES OF DAILY LIVING (ADL)
HYGIENE/GROOMING: INDEPENDENT
DRESS: SCRUBS (BEHAVIORAL HEALTH);INDEPENDENT
DRESS: INDEPENDENT
ORAL_HYGIENE: INDEPENDENT
ORAL_HYGIENE: INDEPENDENT
HYGIENE/GROOMING: INDEPENDENT

## 2021-11-26 NOTE — PLAN OF CARE
BEHAVIORAL TEAM DISCUSSION    Participants:  -Dr. Moy SOUZA  -marquis PRETTY Charge RN  -Yumiko KITCHEN OT  - Maria Del Carmen PharmD     Progress: Improving   Anticipated length of stay: TBD   Continued Stay Criteria/Rationale: symptom stabilization, care-coordination, medication-management   Medical/Physical: Diabetes mellitus hypothyroidism     Precautions:   Behavioral Orders   Procedures    Code 1 - Restrict to Unit    Routine Programming     As clinically indicated    Status 15     Every 15 minutes.     Plan: Return to brother in law/sisters home with psychiatry  Rationale for change in precautions or plan: No changes

## 2021-11-26 NOTE — PROGRESS NOTES
"   11/26/21 1129   Engagement   Intervention Group   Topic Detail Qigong for wellness, relaxation, and coping with sx through distractions   Attendance Attended   Patient Response Contributes to conversation  (Pt was present for a portion of group, but when she was asked if she would participate in this activity again pt said \"I don't know I was in the bathroom\".)   Concentrated on Task 10 - 15 min  (Pt was present for the beginning of the group but left for approx. 30 minutes and then returned for the last 5 minutes.)   Mood/Affect Pleasant   Social/Behavioral Disengaged  (Pt did not participate in the majority of the movements while she was in group and chose to sit and observe instead.)   Thought Content [place]   Supervision   Supervision On-site supervision provided     "

## 2021-11-26 NOTE — PLAN OF CARE
Problem: Behavioral Health Plan of Care  Goal: Optimized Coping Skills in Response to Life Stressors  Outcome: Improving     Problem: Mood Impairment (Psychotic Signs/Symptoms)  Goal: Improved Mood Symptoms (Psychotic Signs/Symptoms)  Outcome: Improving  Intervention: Optimize Emotion and Mood  Recent Flowsheet Documentation  Taken 11/25/2021 1700 by Ida Calvert RN  Diversional Activity: television   Pt. Pleasant, and cooperative, out in the milieu sitting quietly in the recliner watching TV, partially engaged with peers. Continues to be independent with all ADLs. Denies pain, SI/HI and hallucinations, endorses anxiety 0, depression 0, contracts for safety or discomfort, continues to be independent with all ADLs.
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
## 2021-11-26 NOTE — PLAN OF CARE
Problem: Sleep Disturbance  Goal: Adequate Sleep/Rest  Outcome: Improving     Problem: Suicidal Behavior  Goal: Suicidal Behavior is Absent or Managed  Outcome: Improving     Patient having trouble falling asleep, trazodone offered, pt refused.  15 minute safety checks completed. No  pain reported durng shift.  Patient  slept  6 hours.  AM BG-88, no insulin given
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
## 2021-11-27 LAB
GLUCOSE BLDC GLUCOMTR-MCNC: 126 MG/DL (ref 70–99)
GLUCOSE BLDC GLUCOMTR-MCNC: 136 MG/DL (ref 70–99)
GLUCOSE BLDC GLUCOMTR-MCNC: 187 MG/DL (ref 70–99)
GLUCOSE BLDC GLUCOMTR-MCNC: 219 MG/DL (ref 70–99)

## 2021-11-27 PROCEDURE — 250N000013 HC RX MED GY IP 250 OP 250 PS 637: Performed by: NURSE PRACTITIONER

## 2021-11-27 PROCEDURE — 90853 GROUP PSYCHOTHERAPY: CPT

## 2021-11-27 PROCEDURE — 250N000013 HC RX MED GY IP 250 OP 250 PS 637: Performed by: PSYCHIATRY & NEUROLOGY

## 2021-11-27 PROCEDURE — 128N000001 HC R&B CD/MH ADULT

## 2021-11-27 RX ADMIN — ASPIRIN 81 MG: 81 TABLET, COATED ORAL at 08:48

## 2021-11-27 RX ADMIN — ROSUVASTATIN CALCIUM 5 MG: 5 TABLET, FILM COATED ORAL at 08:48

## 2021-11-27 RX ADMIN — AMLODIPINE BESYLATE 5 MG: 5 TABLET ORAL at 08:48

## 2021-11-27 RX ADMIN — LEVOTHYROXINE SODIUM 25 MCG: 25 TABLET ORAL at 06:59

## 2021-11-27 RX ADMIN — OLANZAPINE 12.5 MG: 10 TABLET, ORALLY DISINTEGRATING ORAL at 20:20

## 2021-11-27 RX ADMIN — SITAGLIPTIN 100 MG: 25 TABLET, FILM COATED ORAL at 08:47

## 2021-11-27 ASSESSMENT — ACTIVITIES OF DAILY LIVING (ADL)
DRESS: INDEPENDENT
HYGIENE/GROOMING: INDEPENDENT
ORAL_HYGIENE: INDEPENDENT
HYGIENE/GROOMING: INDEPENDENT
ORAL_HYGIENE: INDEPENDENT
DRESS: SCRUBS (BEHAVIORAL HEALTH);INDEPENDENT

## 2021-11-27 NOTE — PLAN OF CARE
Problem: Behavioral Health Plan of Care  Goal: Adheres to Safety Considerations for Self and Others  Intervention: Develop and Maintain Individualized Safety Plan  Recent Flowsheet Documentation  Taken 11/27/2021 0157 by Taylor Muniz, RN  Safety Measures:    safety rounds completed    environmental rounds completed     Problem: Sleep Disturbance (Psychotic Signs/Symptoms)  Goal: Improved Sleep (Psychotic Signs/Symptoms)  Outcome: Improving    Patient slept well, woke up x 1 to use the bathroom. No concerns related to delusional thoughts about being in danger while sleeping. Safety checks completed Q 15 min's and door left opened. She had 7 hrs of sleep.

## 2021-11-27 NOTE — PROGRESS NOTES
11/27/21 1140   Engagement   Intervention Group   Topic Detail OT: Creative expressions (Sand art) for concentration, frustration tolerance, healthy leisure and symptom management   Attendance Attended   Patient Response Needs reinforcement/repetition to learn materials;Was respectful;Positive attitude   Concentrated on Task duration of group   Cognition Sequences task   Mood/Affect Content;Congruent   Social/Behavioral Cooperative;Engaged   Thought Content [place]   Goals addressed in session today Pt progressing toward OT goal, ID'ing being in the fresh air as coping strategy for managing mental health symptoms. Pt requiring direction for sequencing task, repeating instruction for continuing task. Pt having positive response to activity, reporting pt was happy with final product.

## 2021-11-27 NOTE — PROVIDER NOTIFICATION
11/27/21 1400   Engagement   Intervention Group   Topic Detail SW: Coping Skills   Attendance Attended   Patient Response Demonstrated understanding of materials provided   Concentrated on Task duration of group   Cognition Goal-directed;Follows through with task   Mood/Affect Blunted   Social/Behavioral Cooperative;Engaged   Thought Content [place]     GROUP LENGTH (MINS):      RELEVANT PRIMARY DIAGNOSIS: Patient Active Problem List   Diagnosis     Psychosis (H)     Paranoid schizophrenia, chronic condition with acute exacerbation (H)     Type 2 diabetes mellitus without complication, with long-term current use of insulin (H)     Acquired hypothyroidism     Hyperlipidemia LDL goal <130     Personal history of noncompliance with medical treatment, presenting hazards to health        TREATMENT MODALITY:      [x]CBT       []DBT       []ACT       []Interpersonal psychotherapy                                 [x]Psychoeducational therapy       []Skill development       []Other:        ATTENDANCE:        [x]Stayed for entire group     []Arrived late    [] Left early       # OF PATIENTS IN GROUP: 5   BILLABLE:     [x]Yes            []No   Notes:   40m
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
## 2021-11-28 LAB
GLUCOSE BLDC GLUCOMTR-MCNC: 122 MG/DL (ref 70–99)
GLUCOSE BLDC GLUCOMTR-MCNC: 171 MG/DL (ref 70–99)
GLUCOSE BLDC GLUCOMTR-MCNC: 238 MG/DL (ref 70–99)
GLUCOSE BLDC GLUCOMTR-MCNC: 77 MG/DL (ref 70–99)

## 2021-11-28 PROCEDURE — 250N000013 HC RX MED GY IP 250 OP 250 PS 637: Performed by: NURSE PRACTITIONER

## 2021-11-28 PROCEDURE — 250N000013 HC RX MED GY IP 250 OP 250 PS 637: Performed by: PSYCHIATRY & NEUROLOGY

## 2021-11-28 PROCEDURE — 128N000001 HC R&B CD/MH ADULT

## 2021-11-28 RX ADMIN — OLANZAPINE 12.5 MG: 10 TABLET, ORALLY DISINTEGRATING ORAL at 20:25

## 2021-11-28 RX ADMIN — SITAGLIPTIN 100 MG: 25 TABLET, FILM COATED ORAL at 08:07

## 2021-11-28 RX ADMIN — ASPIRIN 81 MG: 81 TABLET, COATED ORAL at 08:07

## 2021-11-28 RX ADMIN — LEVOTHYROXINE SODIUM 25 MCG: 25 TABLET ORAL at 06:45

## 2021-11-28 RX ADMIN — AMLODIPINE BESYLATE 5 MG: 5 TABLET ORAL at 08:08

## 2021-11-28 RX ADMIN — CARBOXYMETHYLCELLULOSE SODIUM 1 DROP: 0.5 SOLUTION/ DROPS OPHTHALMIC at 11:42

## 2021-11-28 RX ADMIN — ROSUVASTATIN CALCIUM 5 MG: 5 TABLET, FILM COATED ORAL at 08:07

## 2021-11-28 ASSESSMENT — ACTIVITIES OF DAILY LIVING (ADL)
ORAL_HYGIENE: INDEPENDENT
DRESS: INDEPENDENT
LAUNDRY: WITH SUPERVISION
HYGIENE/GROOMING: INDEPENDENT
ORAL_HYGIENE: INDEPENDENT
HYGIENE/GROOMING: INDEPENDENT
DRESS: INDEPENDENT

## 2021-11-28 NOTE — PLAN OF CARE
Problem: Behavioral Health Plan of Care  Goal: Adheres to Safety Considerations for Self and Others  Intervention: Develop and Maintain Individualized Safety Plan  Recent Flowsheet Documentation  Taken 11/28/2021 0201 by Taylor Muniz, RN  Safety Measures:   safety rounds completed   environmental rounds completed     Problem: Sleep Disturbance (Psychotic Signs/Symptoms)  Goal: Improved Sleep (Psychotic Signs/Symptoms)  Outcome: Improving     Problem: Sleep Disturbance  Goal: Adequate Sleep/Rest  Outcome: Improving    Patient had more than 6 hrs of uninterrupted sleep. Safety interventions in place per unit protocol. No concerns noted during this shift.

## 2021-11-28 NOTE — PROGRESS NOTES
Patient was assigned the writer at 1900 Pt denies pain and all psych symptoms. She has been compliant with med and cares. Pt is now in bed with nurse call button within reach. The writer is continuing to monitor and assist pt as needed.

## 2021-11-28 NOTE — PLAN OF CARE
Problem: Behavioral Health Plan of Care  Goal: Plan of Care Review  Recent Flowsheet Documentation  Taken 11/28/2021 0815 by Queta Valadez RN  Plan of Care Reviewed With: patient  Patient Agreement with Plan of Care: agrees     Problem: Behavioral Health Plan of Care  Goal: Adheres to Safety Considerations for Self and Others  Intervention: Develop and Maintain Individualized Safety Plan  Recent Flowsheet Documentation  Taken 11/28/2021 0815 by Queta Valadez, RN  Safety Measures: safety rounds completed    Patient calm and cooperative. Patient anxiety 8/10 and denied having depression. Denied SI, SIB, HI and all other hallucinations. No complaints of pain. Contract for safety. Medication compliant. Requested prn eyedrops. Out in the milieu with her sunglass on for meals and watching TV. Patient showered today.

## 2021-11-29 ENCOUNTER — APPOINTMENT (OUTPATIENT)
Dept: INTERNAL MEDICINE | Facility: CLINIC | Age: 71
End: 2021-11-29

## 2021-11-29 LAB
GLUCOSE BLDC GLUCOMTR-MCNC: 136 MG/DL (ref 70–99)
GLUCOSE BLDC GLUCOMTR-MCNC: 174 MG/DL (ref 70–99)
GLUCOSE BLDC GLUCOMTR-MCNC: 182 MG/DL (ref 70–99)
GLUCOSE BLDC GLUCOMTR-MCNC: 86 MG/DL (ref 70–99)
GLUCOSE BLDC GLUCOMTR-MCNC: 97 MG/DL (ref 70–99)

## 2021-11-29 PROCEDURE — 99232 SBSQ HOSP IP/OBS MODERATE 35: CPT | Performed by: PSYCHIATRY & NEUROLOGY

## 2021-11-29 PROCEDURE — 250N000013 HC RX MED GY IP 250 OP 250 PS 637: Performed by: PSYCHIATRY & NEUROLOGY

## 2021-11-29 PROCEDURE — 128N000001 HC R&B CD/MH ADULT

## 2021-11-29 PROCEDURE — 250N000013 HC RX MED GY IP 250 OP 250 PS 637: Performed by: NURSE PRACTITIONER

## 2021-11-29 RX ADMIN — LEVOTHYROXINE SODIUM 25 MCG: 25 TABLET ORAL at 06:54

## 2021-11-29 RX ADMIN — CARBOXYMETHYLCELLULOSE SODIUM 1 DROP: 0.5 SOLUTION/ DROPS OPHTHALMIC at 19:07

## 2021-11-29 RX ADMIN — SITAGLIPTIN 100 MG: 25 TABLET, FILM COATED ORAL at 08:46

## 2021-11-29 RX ADMIN — OLANZAPINE 12.5 MG: 10 TABLET, ORALLY DISINTEGRATING ORAL at 20:18

## 2021-11-29 RX ADMIN — ROSUVASTATIN CALCIUM 5 MG: 5 TABLET, FILM COATED ORAL at 08:46

## 2021-11-29 RX ADMIN — AMLODIPINE BESYLATE 5 MG: 5 TABLET ORAL at 08:46

## 2021-11-29 ASSESSMENT — ACTIVITIES OF DAILY LIVING (ADL)
HYGIENE/GROOMING: INDEPENDENT
LAUNDRY: WITH SUPERVISION
ORAL_HYGIENE: INDEPENDENT
DRESS: INDEPENDENT
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
## 2021-11-29 NOTE — PROGRESS NOTES
11/29/21 1037   Engagement   Intervention Group   Topic Detail truth or dare wellness for socializing, leisure, and physical movement   Attendance Attended   Patient Response Demonstrated understanding of materials provided;Contributes to conversation;Was respectful   Concentrated on Task duration of group   Cognition Goal-directed;Follows through with task   Mood/Affect Pleasant   Social/Behavioral Engaged;Cooperative   Thought Content [place]  (Provided simple answers to questions and did not elaborate on answers unless prompted.)   Goals addressed in session today Pt engaged in activity and answered truth questions and participated in the exercises. She also ID dancing as something she is good at and enjoyed doing when she was younger. She noted that she enjoyed listening to her peer's stories.   Supervision   Supervision On-site supervision provided

## 2021-11-29 NOTE — PROGRESS NOTES
11/29/21 1520   Engagement   Intervention Group   Topic Detail Creative expression (painting word signs) for sequencing, leisure, relaxation, and coping with sx through distraction   Attendance Attended   Patient Response Demonstrated understanding of materials provided;Was respectful;Contributes to conversation  (Pt used the dot paint brush as a regular paint brush instead of using it to paint dots despite being told how to use the brush. She requested a song to listen to and complemented her peer's work.)   Concentrated on Task duration of group   Cognition Goal-directed;Follows through with task   Mood/Affect Pleasant   Social/Behavioral Cooperative;Engaged  (Pt cleaned up her supplies when finished (including water on the floor))   Thought Content [place]   Supervision   Supervision On-site supervision provided

## 2021-11-29 NOTE — PROGRESS NOTES
11/29/21 1300   Engagement   Intervention Group   Topic Detail process: ACT Leaves on a stream   Attendance Attended   Patient Response Demonstrated understanding of materials provided   Concentrated on Task duration of group   Cognition Goal-directed   Mood/Affect Content;Pleasant   Social/Behavioral Cooperative;Engaged   Thought Content [place]     GROUP LENGTH (MINS):   30 m   RELEVANT PRIMARY DIAGNOSIS: Patient Active Problem List   Diagnosis     Psychosis (H)     Paranoid schizophrenia, chronic condition with acute exacerbation (H)     Type 2 diabetes mellitus without complication, with long-term current use of insulin (H)     Acquired hypothyroidism     Hyperlipidemia LDL goal <130     Personal history of noncompliance with medical treatment, presenting hazards to health        TREATMENT MODALITY:      []CBT       []DBT       [x]ACT       []Interpersonal psychotherapy                                 []Psychoeducational therapy       []Skill development       []Other:        ATTENDANCE:        [x]Stayed for entire group     []Arrived late    [] Left early       # OF PATIENTS IN GROUP: 5   BILLABLE:     [x]Yes            []No   Notes:

## 2021-11-29 NOTE — PLAN OF CARE
BEHAVIORAL TEAM DISCUSSION    Participants:   -Dr. Moy MD  -Macy MIX Charge RN  -Yumiko ESQUIVEL OT  -Flor TOLEDO PharmD     Progress:Improving   Anticipated length of stay: TBD   Continued Stay Criteria/Rationale: symptom stabilization, care-coordination, medication-management   Medical/Physical: Diabetes mellitus hypothyroidism     Precautions:   Behavioral Orders   Procedures     Code 1 - Restrict to Unit     Routine Programming     As clinically indicated     Status 15     Every 15 minutes.     Plan :Return to brother in law/sisters home with psychiatry  Rationale for change in precautions or plan: No changes
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
## 2021-11-29 NOTE — PLAN OF CARE
Assessment/Intervention/Current Symtoms and Care Coordination: Writer met with pt on this date and consulted with psychiatrist. Pt states that she is doing better. Pt states that she does at time have thoughts of people wanting to hurt her, but endorses that theses thoughts are less intrusive. Pt is in agreement with discharging home later this week. Pt states that she would like to speak to psychiatrist in this regard. Pt was agreeable to signing DANNIE for writer to initiate referrals for outpatient psychiatry. Pt denies safety concerns including SI/HI/SIB.      to start discharge planning efforts of scheduling outpatient psychiatry.    Discharge Plan or Goal Return to brother in law/sister's home, [facility] and psychiatry.     Barriers to Discharge Symptom stabilization medication management and care coordination     Referral Status pending psychiatry [facility].   11/29- Pt signed DANNIE on 11/29/21 for [facility]      Pt Contacts:   brother in-law, Sarbjit Arriaza [phone]    BRET Hernandez [facility], 11/29/2021, 10:51 AM

## 2021-11-29 NOTE — PLAN OF CARE
Problem: Behavioral Health Plan of Care  Goal: Adheres to Safety Considerations for Self and Others  Intervention: Develop and Maintain Individualized Safety Plan  Recent Flowsheet Documentation  Taken 11/29/2021 0232 by Taylor Muniz, RN  Safety Measures:   environmental rounds completed   safety rounds completed     Problem: Mood Impairment (Psychotic Signs/Symptoms)  Goal: Improved Mood Symptoms (Psychotic Signs/Symptoms)  Outcome: Improving     Problem: Sleep Disturbance  Goal: Adequate Sleep/Rest  Outcome: Improving    Patient had more than 6 hrs of sleep. Safety checks completed per unit policy. No delusional thoughts of being attack while sleeping reported. Will continue to monitor.

## 2021-11-30 LAB
GLUCOSE BLDC GLUCOMTR-MCNC: 100 MG/DL (ref 70–99)
GLUCOSE BLDC GLUCOMTR-MCNC: 132 MG/DL (ref 70–99)
GLUCOSE BLDC GLUCOMTR-MCNC: 234 MG/DL (ref 70–99)
GLUCOSE BLDC GLUCOMTR-MCNC: 236 MG/DL (ref 70–99)

## 2021-11-30 PROCEDURE — 250N000013 HC RX MED GY IP 250 OP 250 PS 637: Performed by: PSYCHIATRY & NEUROLOGY

## 2021-11-30 PROCEDURE — 90853 GROUP PSYCHOTHERAPY: CPT

## 2021-11-30 PROCEDURE — 128N000001 HC R&B CD/MH ADULT

## 2021-11-30 PROCEDURE — 250N000013 HC RX MED GY IP 250 OP 250 PS 637: Performed by: NURSE PRACTITIONER

## 2021-11-30 PROCEDURE — 99232 SBSQ HOSP IP/OBS MODERATE 35: CPT | Performed by: PSYCHIATRY & NEUROLOGY

## 2021-11-30 RX ADMIN — OLANZAPINE 15 MG: 5 TABLET, ORALLY DISINTEGRATING ORAL at 21:05

## 2021-11-30 RX ADMIN — LEVOTHYROXINE SODIUM 25 MCG: 25 TABLET ORAL at 06:53

## 2021-11-30 RX ADMIN — SITAGLIPTIN 100 MG: 25 TABLET, FILM COATED ORAL at 08:37

## 2021-11-30 RX ADMIN — ROSUVASTATIN CALCIUM 5 MG: 5 TABLET, FILM COATED ORAL at 08:37

## 2021-11-30 NOTE — PROGRESS NOTES
"   11/30/21 1038   Engagement   Intervention Group   Topic Detail Bridge of Self Confidence activity for wellbeing, social enagement, and improving feelings of self-worth   Attendance Attended   Patient Response Prosocial behavior;Contributes to conversation;Was respectful;Improved mood in response to group;Needs reinforcement/repetition to learn materials  (Pt demonstrated difficulty remembering what marker was hers and required a verbal cue for which card to choose. She assisted a peer saying \"you can choose a different card if you don't like that one\")   Concentrated on Task duration of group   Cognition Goal-directed;Follows through with task   Mood/Affect Pleasant   Social/Behavioral Engaged;Cooperative   Thought Content [place]   Goals addressed in session today Pt ID walking as a strategy she uses when she feels stressed. She also shared that she enjoys talking and reminiscing with an old friend.   Supervision   Supervision On-site supervision provided     "

## 2021-11-30 NOTE — PROGRESS NOTES
11/30/21 1300   Engagement   Intervention Group   Topic Daily living skills;Wellness strategies   Topic Detail SW process group: Sleep   Attendance Attended   Patient Response Demonstrated understanding of materials provided;Was respectful   Concentrated on Task duration of group   Cognition Goal-directed;Follows through with task   Mood/Affect Pleasant;Content   Social/Behavioral Engaged;Cooperative   Thought Content [place]     GROUP LENGTH (MINS):   40 minutes   RELEVANT PRIMARY DIAGNOSIS: Patient Active Problem List   Diagnosis     Psychosis (H)     Paranoid schizophrenia, chronic condition with acute exacerbation (H)     Type 2 diabetes mellitus without complication, with long-term current use of insulin (H)     Acquired hypothyroidism     Hyperlipidemia LDL goal <130     Personal history of noncompliance with medical treatment, presenting hazards to health        TREATMENT MODALITY:      []CBT       []DBT       []ACT       []Interpersonal psychotherapy                                 [x]Psychoeducational therapy       []Skill development       []Other:        ATTENDANCE:        [x]Stayed for entire group     []Arrived late    [] Left early       # OF PATIENTS IN GROUP: 4   BILLABLE:     [x]Yes            []No   Notes:

## 2021-11-30 NOTE — PLAN OF CARE
Assessment/Intervention/Current Symtoms and Care Coordination: Writer met with pt on this date and consulted with psychiatrist. Pt states that she feels ready to discharge home. Pt is no longer endorses auditory hallucinations and has not expressed fear that if she discharges someone will hurt her family. Pt states that she is excited to return to her sister's home. Pt is brighter in affect and pleasant. Pt actively attends most groups and spends time appropriately socializing with peers on the unit. Pt denies safety concerns including SI/HI/SIB. Writer did schedule outpatient psychiatry appointments for pt post discharge.     Writer did call and speak to pt's brother in law Sarbjit- #[phone] and he is in agreement with plan for pt to discharge home on Thursday, 12/02. Sarbjit states that he can provide transport and pick pt up at 1:00 PM.     Discharge Plan or Goal Return to brother in law/sister's home and psychiatry. Plan is for pt to discharge home on Thursday, 12/02.      Barriers to Discharge Symptom stabilization medication management and care coordination     Referral Status pending psychiatry [facility].   11/29- Pt signed DANNIE on 11/29/21 for [facility].     Appointment Type: Outpatient psychiatry appointment   Provider: CHRIST Bacon   Date & Time: Wednesday, December 15th, 2021 @ 1:20 PM   Clinic: [facility] [place]   Address: [address], [place] [address]   Phone: [phone]  Additional Notes: This is an in person appointment, please arrive 30 minutes prior to appointment to complete paperwork.     Appointment Type: Outpatient psychiatry appointment   Provider: CHRIST Bacon   Date & Time: Friday, January 14th, 2021 @ 1:20 PM   Clinic: [facility] [place]   Address: [address], [place] [address]   Phone: [phone]  Additional Notes: This is an in person appointment.           Pt Contacts:   brother in-law, Sarbjit Arriaza [phone]      BRET Hernandez [facility], 11/30/2021, 12:38 PM

## 2021-11-30 NOTE — PLAN OF CARE
Problem: Behavioral Health Plan of Care  Goal: Plan of Care Review  Outcome: No Change     Problem: Behavioral Health Plan of Care  Goal: Adheres to Safety Considerations for Self and Others  Outcome: No Change  Intervention: Develop and Maintain Individualized Safety Plan  Recent Flowsheet Documentation  Taken 11/30/2021 5155 by Macy Whitt RN  Safety Measures:    safety rounds completed    suicide assessment completed     Problem: Behavior Regulation Impairment (Psychotic Signs/Symptoms)  Goal: Improved Behavioral Control (Psychotic Signs/Symptoms)  Outcome: No Change       Pt is alert and oriented. Calm, pleasant and cooperative with care.   She denies any concerns or complaints. Denies anxiety or depression. No overt delusions or paranoia observed.   She is social on the unit, attending groups.     Pt denies any pain concerns.     BG checks-100, 132 this shift.   [facility] held for /56.

## 2021-11-30 NOTE — PLAN OF CARE
Patient slept most part of the shift without any issues. Safety checks were conducted every 15 minutes to ensure her safety . No pain was reported, and patient denied all psych symptoms. BS was 100  and patient was medication compliant.

## 2021-11-30 NOTE — PROGRESS NOTES
"   11/30/21 1502   Engagement   Intervention Group   Topic Detail bag toss to facilitate social engagement and teamwork, frustration tolerance, and leisure exploration   Attendance Attended   Patient Response Demonstrated understanding of materials provided;Was respectful;Improved mood in response to group;Contributes to conversation  (Pt did not initially answer check-in question (what outdoor game do you like playing) reporting \"I'm not from here\", but answered \"hide and seek\" when the question was rephrased. Encouraged and cheered on peers throughout game)   Concentrated on Task duration of group   Cognition Goal-directed;Follows through with task   Mood/Affect Pleasant   Social/Behavioral Cooperative;Engaged  (Helped  bean bags at the end of group)   Thought Content [place]   Supervision   Supervision On-site supervision provided     "

## 2021-11-30 NOTE — PROGRESS NOTES
"PSYCHIATRY  PROGRESS NOTE     DATE OF SERVICE   11/30/2021       CHIEF COMPLAINT   \"I feel better.\"       SUBJECTIVE   Nursing reports patient slept well without any issues. No psych symptoms were noted during the shift.     will continue working with patient and her family to coordinate a safe discharge plan. Patient is tentatively to be discharged from the hospital on Thursday.  to start discharge planning efforts of scheduling outpatient psychiatry. Discharge plan is to return to brother in law/sister's home, [facility] and psychiatry. Pending psychiatry [facility] [place] referral. Patient signed DANNIE on 11/29/21 for [facility].        OBJECTIVE   Patient was seen and evaluated in the day area by this writer. This was a face-to-face evaluation and patient was by herself. The interview was conducted in Tongan. This writer is bilingual in Tongan and there was no need for an . Patient reported that she slept from 10:00PM to 6:30AM. She feels rested this morning. Patient appeared calm, comfortable, cooperative with the interview and was not wearing her sunglasses. Patient reported that she feels better and that her thoughts are less intrusive. Patient has been avoiding watching TV because she does not want to see threatening messages. Patient did not report seeing any messages during this interview. Patient communicated to writer that she will tentatively be discharged from the hospital on Thursday. She denied concerns at this moment. Patient denied thoughts of harming herself or others.        MEDICATIONS   Medications:  Scheduled Meds:    amLODIPine  5 mg Oral Daily     aspirin  81 mg Oral Daily     insulin aspart  1-7 Units Subcutaneous TID AC     insulin glargine  10 Units Subcutaneous At Bedtime     levothyroxine  25 mcg Oral QAM AC     OLANZapine zydis  12.5 mg Oral At Bedtime     rosuvastatin  5 mg Oral Daily     sitagliptin  100 mg Oral Daily     Continuous " Infusions:  PRN Meds:.acetaminophen, alum & mag hydroxide-simethicone, artificial tears ophthalmic solution, glucose **OR** dextrose **OR** glucagon, gabapentin, nicotine, OLANZapine **OR** OLANZapine, polyethylene glycol, traZODone    Medication adherence issues: MS Med Adherence Y/N: No  Medication side effects: MEDICATION SIDE EFFECTS: no side effects reported  Benefit: Yes / No: Yes       ROS   A comprehensive review of systems was negative.       MENTAL STATUS EXAM   Vitals: /55   Pulse 71   Temp 97.9  F (36.6  C)   Resp 17   Wt 54.7 kg (120 lb 11.2 oz)   SpO2 97%     Appearance:  No apparent distress  Mood:  {Mood: Normal  Affect: appropriate  was congruent to speech  Suicidal Ideation: PRESENT / ABSENT: absent   Homicidal Ideation: PRESENT / ABSENT: absent   Thought process: improving, more linear and less delusional  Thought content: has improved significantly, her thoughts are less intrusive   Fund of Knowledge: Average  Attention/Concentration: Normal  Language ability:  Intact  Memory:  Immediate recall intact  Insight: improving   Judgement: improving  Orientation: Yes, x4  Psychomotor Behavior: normal or unremarkable    Muscle Strength and Tone: MuscleStrength: Normal  Gait and Station: Normal       LABS   personally reviewed.     No results found for: PHENYTOIN, PHENOBARB, VALPROATE, CBMZ       DIAGNOSIS   Principal Problem:    Paranoid schizophrenia, chronic condition with acute exacerbation (H)    Active Problem List:  Patient Active Problem List   Diagnosis     Psychosis (H)     Paranoid schizophrenia, chronic condition with acute exacerbation (H)     Type 2 diabetes mellitus without complication, with long-term current use of insulin (H)     Acquired hypothyroidism     Hyperlipidemia LDL goal <130     Personal history of noncompliance with medical treatment, presenting hazards to health          PLAN   1. Ongoing education given regarding diagnostic and treatment options with risks,  benefits and alternatives and adequate verbalization of understanding.  2. Increase Zyprexa to 15 mg at bedtime.  3. Medical team currently following the patient  4.  coordinating a safe discharge plan with the patient and her family    Patient has been seen with PA holger, I agree with her assessment and evaluation of the patient.    Risk Assessment: [facility] RISK ASSESSMENT: Patient able to contract for safety    Coordination of Care:   Treatment Plan reviewed and physician signed, Care discussed with Care/Treatment Team Members, Chart reviewed and Patient seen      Re-Certification I certify that the inpatient psychiatric facility services furnished since the previous certification were, and continue to be, medically necessary for, either, treatment which could reasonably be expected to improve the patient s condition or diagnostic study and that the hospital records indicate that the services furnished were, either, intensive treatment services, admission and related services necessary for diagnostic study, or equivalent services.     I certify that the patient continues to need, on a daily basis, active treatment furnished directly by or requiring the supervision of inpatient psychiatric facility personnel.   I estimate 14 days of hospitalization is necessary for proper treatment of the patient. My plans for post-hospital care for this patient are  home with family     Teena Mccarthy    -     11/30/2021  -     09:55 AM    Total time  25 minutes with > 50%spent on coordination of cares and psycho-education.    This note was created with help of Dragon dictation system. Grammatical / typing errors are not intentional.    DEIDRA Flaherty

## 2021-12-01 LAB
GLUCOSE BLDC GLUCOMTR-MCNC: 122 MG/DL (ref 70–99)
GLUCOSE BLDC GLUCOMTR-MCNC: 220 MG/DL (ref 70–99)
GLUCOSE BLDC GLUCOMTR-MCNC: 76 MG/DL (ref 70–99)

## 2021-12-01 PROCEDURE — 99232 SBSQ HOSP IP/OBS MODERATE 35: CPT | Performed by: PSYCHIATRY & NEUROLOGY

## 2021-12-01 PROCEDURE — 128N000001 HC R&B CD/MH ADULT

## 2021-12-01 PROCEDURE — 250N000013 HC RX MED GY IP 250 OP 250 PS 637: Performed by: PSYCHIATRY & NEUROLOGY

## 2021-12-01 PROCEDURE — 250N000013 HC RX MED GY IP 250 OP 250 PS 637: Performed by: NURSE PRACTITIONER

## 2021-12-01 RX ADMIN — CARBOXYMETHYLCELLULOSE SODIUM 1 DROP: 0.5 SOLUTION/ DROPS OPHTHALMIC at 16:16

## 2021-12-01 RX ADMIN — ROSUVASTATIN CALCIUM 5 MG: 5 TABLET, FILM COATED ORAL at 08:12

## 2021-12-01 RX ADMIN — AMLODIPINE BESYLATE 5 MG: 5 TABLET ORAL at 08:12

## 2021-12-01 RX ADMIN — SITAGLIPTIN 100 MG: 25 TABLET, FILM COATED ORAL at 08:12

## 2021-12-01 RX ADMIN — ASPIRIN 81 MG: 81 TABLET, COATED ORAL at 08:12

## 2021-12-01 RX ADMIN — LEVOTHYROXINE SODIUM 25 MCG: 25 TABLET ORAL at 06:53

## 2021-12-01 RX ADMIN — OLANZAPINE 15 MG: 5 TABLET, ORALLY DISINTEGRATING ORAL at 20:19

## 2021-12-01 ASSESSMENT — ACTIVITIES OF DAILY LIVING (ADL)
HYGIENE/GROOMING: HANDWASHING;SHOWER
ORAL_HYGIENE: INDEPENDENT
DRESS: SCRUBS (BEHAVIORAL HEALTH)

## 2021-12-01 NOTE — PLAN OF CARE
Assessment/Intervention/Current Symtoms and Care Coordination: Writer met with pt on this date and consulted with psychiatrist. Pt was pleasant on approach, and cooperative. Pt states that she is excited to discharge home tomorrow. Pt is aware that her brother-in-law Sarbjit will be picking her up and bringing her home. Pt denies SI/HI/SIB. Pt also denies AVH and states that she is able to watch TV and enjoy it now. Pt is aware of current psychiatry follow-up. Pt is requesting that medications be filled in hospital pharmacy. Writer also reviewed discharge plan with brothbola in law Schaffer 11/30.     Writer did call to confirm that pt will have Slovenian speaking  present during first appointment with outpatient psychiatrist.     Discharge Plan or Goal Return to brother in law/sister's home and psychiatry. Plan is for pt to discharge home on Thursday, 12/02.      Barriers to Discharge Symptom stabilization medication management and care coordination     Referral Status pending psychiatry [facility] [place].   11/29- Pt signed DANNIE on 11/29/21 for [facility] [facility].      Appointment Type: Outpatient psychiatry appointment   Provider: CHRIST Bacon   Date & Time: Wednesday, December 15th, 2021 @ 1:20 PM   Clinic: [facility] [place]   Address: [address], [place] [address]   Phone: [phone]  Additional Notes: This is an in person appointment, please arrive 30 minutes prior to appointment to complete paperwork.      Appointment Type: Outpatient psychiatry appointment   Provider: CHRIST Bacon   Date & Time: Friday, January 14th, 2021 @ 1:20 PM   Clinic: [facility] [place]   Address: [address], [place] [address]   Phone: [phone]  Additional Notes: This is an in person appointment.            Pt Contacts:   brothbola inSarbjit osman [phone]      BRET Hernandez [facility], 12/1/2021,  10:19 AM

## 2021-12-01 NOTE — PLAN OF CARE
BEHAVIORAL TEAM DISCUSSION    Participants:   -Dr. Moy MD  -Macy MIX Charge RN  -Yumiko ESQUIVEL OT  -Flor TOLEDO PharmD   Progress: Improving   Anticipated length of stay: Pt is discharging on 12/02  Continued Stay Criteria/Rationale: symptom stabilization, care-coordination, medication-management   Medical/Physical: Diabetes mellitus hypothyroidism     Precautions:   Behavioral Orders   Procedures     Code 1 - Restrict to Unit     Routine Programming     As clinically indicated     Status 15     Every 15 minutes.     Plan: Return to brother in law/sisters home with psychiatry. Pt will be discharging on Thursday, 12/01.   Rationale for change in precautions or plan: No changes

## 2021-12-01 NOTE — PROVIDER NOTIFICATION
12/01/21 1400   Engagement   Intervention Group   Topic Detail SW: Anxiety    Attendance Attended   Patient Response Demonstrated understanding of materials provided   Concentrated on Task duration of group   Cognition Goal-directed;Takes initiative   Mood/Affect Content;Pleasant   Social/Behavioral Cooperative;Engaged   Thought Content [place] oriented     GROUP LENGTH (MINS):      RELEVANT PRIMARY DIAGNOSIS: Patient Active Problem List   Diagnosis     Psychosis (H)     Paranoid schizophrenia, chronic condition with acute exacerbation (H)     Type 2 diabetes mellitus without complication, with long-term current use of insulin (H)     Acquired hypothyroidism     Hyperlipidemia LDL goal <130     Personal history of noncompliance with medical treatment, presenting hazards to health        TREATMENT MODALITY:      []CBT       []DBT       []ACT       [x]Interpersonal psychotherapy                                 [x]Psychoeducational therapy       []Skill development       []Other:        ATTENDANCE:        [x]Stayed for entire group     []Arrived late    [] Left early       # OF PATIENTS IN GROUP: 4   BILLABLE:     [x]Yes            []No   Notes:   40m

## 2021-12-01 NOTE — PLAN OF CARE
Patient exhibits less paranoia and denies SI/HI and is able to contract for safety   Se remains med compliant and looks forward to discharge tomorrow to family. I continue to explain the importance of remaining med compliant and engaging family members when feeling fearful.     Problem: Behavioral Health Plan of Care  Goal: Absence of New-Onset Illness or Injury  Outcome: No Change     Problem: Behavioral Health Plan of Care  Goal: Plan of Care Review  Outcome: Improving  Flowsheets  Taken 12/1/2021 1238 by Jean Carlos Thurman, RN  Plan of Care Reviewed With: patient  Patient Agreement with Plan of Care: agrees  Taken 11/28/2021 1904 by Favian Graham, RN  Progress: improving  Note: Patient understands discharge planning and looks forward to seeing her family  Goal: Adheres to Safety Considerations for Self and Others  Outcome: Improving

## 2021-12-01 NOTE — PROGRESS NOTES
"PSYCHIATRY  PROGRESS NOTE     DATE OF SERVICE   12/01/2021           CHIEF COMPLAINT   \"I feel better.\"       SUBJECTIVE   Nursing reports patient slept all night without any distress. Patient denied all psychiatric symptoms.      reports: patient will be discharged from the hospital on Thursday 12/02. Discharge plan is to return to brother in law/sister's home, [facility] and psychiatry.        OBJECTIVE   Patient was seen and evaluated in the day area by this writer. This was a face-to-face evaluation and patient was by herself. The interview was conducted in [place]. This writer is bilingual in [place] and there was no need for an . Patient appeared calm, pleasant and cooperative with the assessment. Patient reported that she had a good sleep last night.Patient reported that she did not get any threatening messages yesterday and that she has not seen any this morning either. Patient expressed that she is happy about her discharge tomorrow, but feels \"a little nervous\". Patient said that she knows she is doing better and that will continue her treatment to prevent a relapse. Patient denied hallucinations, suicidal or homicidal ideations. Patient denied any medication side effects.        MEDICATIONS   Medications:  Scheduled Meds:    amLODIPine  5 mg Oral Daily     aspirin  81 mg Oral Daily     insulin aspart  1-7 Units Subcutaneous TID AC     insulin glargine  10 Units Subcutaneous At Bedtime     levothyroxine  25 mcg Oral QAM AC     OLANZapine zydis  15 mg Oral At Bedtime     rosuvastatin  5 mg Oral Daily     sitagliptin  100 mg Oral Daily     Continuous Infusions:  PRN Meds:.acetaminophen, alum & mag hydroxide-simethicone, artificial tears ophthalmic solution, glucose **OR** dextrose **OR** glucagon, gabapentin, nicotine, OLANZapine **OR** OLANZapine, polyethylene glycol, traZODone    Medication adherence issues: MS Med Adherence Y/N: No  Medication side effects: MEDICATION SIDE EFFECTS: no " "side effects reported  Benefit: Yes / No: Yes       ROS   A comprehensive review of systems was negative.       MENTAL STATUS EXAM   Vitals: /56 (BP Location: Left arm, Patient Position: Sitting)   Pulse 73   Temp 97.6  F (36.4  C) (Oral)   Resp 18   Wt 54.7 kg (120 lb 11.2 oz)   SpO2 98%     Appearance:  No apparent distress  Mood: \"I feel better\"   Affect: appropriate  was congruent to speech  Suicidal Ideation: PRESENT / ABSENT: absent   Homicidal Ideation: PRESENT / ABSENT: absent   Thought process: improving, more linear and less delusional  Thought content: has improved significantly, her thoughts are less intrusive  Fund of Knowledge: Average  Attention/Concentration: Normal  Language ability:  Intact  Memory:  Immediate recall intact  Insight:  improving  Judgement: improving  Orientation: Yes, x4  Psychomotor Behavior: normal or unremarkable    Muscle Strength and Tone: MuscleStrength: Normal  Gait and Station: Normal       LABS   personally reviewed.     No results found for: PHENYTOIN, PHENOBARB, VALPROATE, CBMZ       DIAGNOSIS   Principal Problem:    Paranoid schizophrenia, chronic condition with acute exacerbation (H)    Active Problem List:  Patient Active Problem List   Diagnosis     Psychosis (H)     Paranoid schizophrenia, chronic condition with acute exacerbation (H)     Type 2 diabetes mellitus without complication, with long-term current use of insulin (H)     Acquired hypothyroidism     Hyperlipidemia LDL goal <130     Personal history of noncompliance with medical treatment, presenting hazards to health          PLAN   1. Ongoing education given regarding diagnostic and treatment options with risks, benefits and alternatives and adequate verbalization of understanding.  2. Zyprexa 15 mg at bedtime  3. Medical team currently following the patient  4. SW coordinating a safe discharge plan with the patient and her family. Patient will be discharging on Thursday 12/02.     Patient has " been discussed with the physician assistant student, I agree with her assessment and evaluation of the patient.    Risk Assessment: [facility] RISK ASSESSMENT: Patient able to contract for safety    Coordination of Care:   Treatment Plan reviewed and physician signed, Care discussed with Care/Treatment Team Members, Chart reviewed and Patient seen      Re-Certification I certify that the inpatient psychiatric facility services furnished since the previous certification were, and continue to be, medically necessary for, either, treatment which could reasonably be expected to improve the patient s condition or diagnostic study and that the hospital records indicate that the services furnished were, either, intensive treatment services, admission and related services necessary for diagnostic study, or equivalent services.     I certify that the patient continues to need, on a daily basis, active treatment furnished directly by or requiring the supervision of inpatient psychiatric facility personnel.   I estimate 7 days of hospitalization is necessary for proper treatment of the patient. My plans for post-hospital care for this patient are  home with family     Teena Mccarthy    -     12/01/2021  -     10:24 AM  Total time  25 minutes with > 50%spent on coordination of cares and psycho-education.    This note was created with help of Dragon dictation system. Grammatical / typing errors are not intentional.    DEIDRA Flaherty

## 2021-12-01 NOTE — PROGRESS NOTES
12/01/21 1525   Engagement   Intervention Group   Topic Detail OT: Creative expression (affirmation presents) for insight development, planning, leisure and concentration for management of mental health symptoms.   Attendance Attended   Patient Response Demonstrated understanding of materials provided;Asked questions and/or took notes   Concentrated on Task duration of group   Cognition Sequences task;Initiates task   Mood/Affect Content;Pleasant   Social/Behavioral Engaged;Cooperative   Thought Content [place]   Goals addressed in session today Pt progressing toward goal by participating in creative expressions gp today. Pt asking for assistance when needed, having positive interactions with staff. Pt needing prompting to follow direction of creating affirmation.

## 2021-12-01 NOTE — PLAN OF CARE
Patient slept all night without any distress, safety checks were conducted every 15 minutes to ensure patient's safety. No pain was reported and patient denied all psych symptoms. BS was  76, no coverage was given.

## 2021-12-01 NOTE — DISCHARGE INSTRUCTIONS
DIABETES REMINDERS:  1) Check your blood sugar 1-2 times per day.  Always bring your blood sugar log and meter to your diabetes-related appointments.  2) Your blood sugar goals:  80-130mg/dL before eating  and  mg/dL 2 hours after eating (or per your doctor).  3) Always be prepared to treat a low blood sugar should it happen. Keep a sugar-containing beverage or food nearby.  4) When to call your clinic:     Blood sugar over 400 mg/dL.     If you have 2 to 3 low blood sugars (under 70mg/dL) in a row,     Low reading the same time of day several days in a row,    Blood sugars elevated and you can not get them down with your usual diabetes regimen,    You are ill and can't keep blood sugars controlled.   5) When to call 911:  If your blood glucose does not get better with treatment, or if you/someone else is unable to give you treatment.  6) Talk to your primary care doctor about an appointment with a Certified Diabetes Educator to assist you with your BG management.        Behavioral Discharge Planning and Instructions    Summary: You were admitted on 11/9/2021  due to Schizophrenia.  You were treated by Dr. Winter and discharged on 12/02/2021 from [facility] to Home    Main Diagnosis: Paranoid schizophrenia, chronic condition with acute exacerbation     Health Care Follow-up:     Medical Follow-up appointment: Myesha GARCIAS on Monday December 6th  at 10:20 a.m..   [address] [address] [address] [place] [address]  Phone: [phone]   Bring the following to your appointment: all medications, photo id, insurance card, co-pay (if applicable) and discharge paper work from hospital to appt. If you need to change or cancel appt, please call your clinic.    Appointment Type: Outpatient psychiatry appointment   Provider: CHRIST Bacon   Date & Time: Wednesday, December 15th, 2021 @ 1:20 PM   Clinic: [facility] [place]   Address: [address] [place], [place] [address]   Phone: [phone]  Additional Notes: This is an in person appointment, please arrive 30 minutes prior to appointment to complete paperwork.      Appointment Type: Outpatient psychiatry appointment   Provider: CHRIST Bacon   Date & Time: Friday, January 14th, 2021 @ 1:20 PM   Clinic: [facility]   Address: [address] [place] [address]  [address], [place] [address]   Phone: [phone]  Additional Notes: This is an in person appointment.         Attend all scheduled appointments with your outpatient providers. Call at least 24 hours in advance if you need to reschedule an appointment to ensure continued access to your outpatient providers.     Major Treatments, Procedures and Findings:  You were provided with: a psychiatric assessment, assessed for medical stability, medication evaluation and/or management and group therapy    Symptoms to Report: feeling more aggressive, increased confusion, losing more sleep, mood getting worse or thoughts of suicide    Early warning signs can include: increased depression or anxiety sleep disturbances increased thoughts or behaviors of suicide or self-harm  increased unusual thinking, such as paranoia or hearing voices    Safety and Wellness:  Take all medicines as directed.  Make no changes unless your doctor suggests them.      Follow treatment recommendations.  Refrain from alcohol and non-prescribed drugs.  If there is a concern for safety, call [phone].    Resources:   Crisis Intervention: 264.794.7941 or 553-528-2322 (TTY: 937.257.4058).  Call anytime for help.  National [place] on Mental Illness (www.[place].sam.org): 201.788.7202 or 438-154-6632.  National Suicide Prevention Line (www.mentalhealthmn.org): 492-126-APTW (8120)  Mental Health Consumer/Survivor Network of [place] (www.mhcsn.net): 586.663.7235 or 126-515-7102  Mental Health Association of [place] (www.mentalhealth.org): 441.657.5964 or 173-104-7693  [facility]  "Response 434-976-8383  [facility] Rapid Response 122-178-8788  Text 4 Life: txt \"LIFE\" to 87241 for immediate support and crisis intervention  Crisis text line: Text \"[place]\" to 341899. Free, confidential, 24/7.    General Medication Instructions:   See your medication sheet(s) for instructions.   Take all medicines as directed.  Make no changes unless your doctor suggests them.   Go to all your doctor visits.  Be sure to have all your required lab tests. This way, your medicines can be refilled on time.  Do not use any drugs not prescribed by your doctor.  Avoid alcohol.    The Treatment team has appreciated the opportunity to work with you. If you have any questions or concerns about your recent admission, you can contact the unit which can receive your call 24 hours a day, 7 days a week. They will be able to get in touch with a Provider if needed. The unit number is [phone] .        "

## 2021-12-01 NOTE — PROGRESS NOTES
12/01/21 1010   Engagement   Intervention Group   Topic Detail OT: Movement activity (exercise dice) to promote physical wellness, daily living skills, coping skills, and sequencing   Attendance Attended   Patient Response Demonstrated understanding of materials provided;Was respectful;Contributes to conversation  (Pt required an additional verbal cue to look at the dice and then the sheet to see what exercise correlated with the numbers. She answered the check-in question (how do you like to exerise) saying she exercised when she was younger.)   Concentrated on Task duration of group   Cognition Goal-directed;Follows through with task   Mood/Affect Pleasant   Social/Behavioral Engaged;Cooperative  (Pt selected an additional exercise to do at the end of the group (side bends).)   Thought Content [place]   Goals addressed in session today Pt reported she enjoyed the activity and staff educated pt on how she can incorporate these exercises into a daily routine.   Supervision   Supervision On-site supervision provided

## 2021-12-02 VITALS
HEART RATE: 77 BPM | SYSTOLIC BLOOD PRESSURE: 133 MMHG | WEIGHT: 120.7 LBS | RESPIRATION RATE: 16 BRPM | DIASTOLIC BLOOD PRESSURE: 63 MMHG | OXYGEN SATURATION: 98 % | TEMPERATURE: 98.1 F

## 2021-12-02 LAB
GLUCOSE BLDC GLUCOMTR-MCNC: 154 MG/DL (ref 70–99)
GLUCOSE BLDC GLUCOMTR-MCNC: 79 MG/DL (ref 70–99)

## 2021-12-02 PROCEDURE — 99239 HOSP IP/OBS DSCHRG MGMT >30: CPT | Performed by: PSYCHIATRY & NEUROLOGY

## 2021-12-02 PROCEDURE — 250N000013 HC RX MED GY IP 250 OP 250 PS 637: Performed by: NURSE PRACTITIONER

## 2021-12-02 RX ORDER — OLANZAPINE 15 MG/1
15 TABLET, ORALLY DISINTEGRATING ORAL AT BEDTIME
Qty: 30 TABLET | Refills: 0 | Status: SHIPPED | OUTPATIENT
Start: 2021-12-02 | End: 2022-01-21

## 2021-12-02 RX ADMIN — SITAGLIPTIN 100 MG: 25 TABLET, FILM COATED ORAL at 08:15

## 2021-12-02 RX ADMIN — ROSUVASTATIN CALCIUM 5 MG: 5 TABLET, FILM COATED ORAL at 08:16

## 2021-12-02 RX ADMIN — LEVOTHYROXINE SODIUM 25 MCG: 25 TABLET ORAL at 06:30

## 2021-12-02 RX ADMIN — AMLODIPINE BESYLATE 5 MG: 5 TABLET ORAL at 08:15

## 2021-12-02 RX ADMIN — ASPIRIN 81 MG: 81 TABLET, COATED ORAL at 08:16

## 2021-12-02 ASSESSMENT — ACTIVITIES OF DAILY LIVING (ADL)
ORAL_HYGIENE: INDEPENDENT
HYGIENE/GROOMING: INDEPENDENT
DRESS: SCRUBS (BEHAVIORAL HEALTH)

## 2021-12-02 NOTE — DISCHARGE SUMMARY
"PSYCHIATRY  DISCHARGE SUMMARY     DATE OF DISCHARGE   12/02/2021       DISCHARGE DIAGNOSIS   Paranoid schizophrenia, chronic condition with acute exacerbation (H)    Patient Active Problem List   Diagnosis     Psychosis (H)     Paranoid schizophrenia, chronic condition with acute exacerbation (H)     Type 2 diabetes mellitus without complication, with long-term current use of insulin (H)     Acquired hypothyroidism     Hyperlipidemia LDL goal <130     Personal history of noncompliance with medical treatment, presenting hazards to health          REASON FOR ADMISSION   This is a 71 year old female with history of Paranoid schizophrenia, chronic condition with acute exacerbation (H).    Current legal status is voluntary as patient is in agreement with staying in the hospital.  Patient is a 71-year-old woman from [place], she is currently domiciled with her sister and brother-in-law, unemployed and supported by her family; that was admitted to the psychiatric inpatient unit due to increased paranoia, delusion and psychosis in the context of medication noncompliance.     Today patient was seen and evaluated in the consult room by herself, this was a face-to-face evaluation.  Interview was conducted in Bahamian by this writer.  During the assessment the patient insisted in speaking English despite her not making sense, having problems understanding English and not being able to fully communicate.  Patient initially said that she was not going to talk to me as she was worried her family was murdered.  Patient was also demanding to speak with \"an American doctor\".  Patient insisted in this writer contacting her family which I did, but they did not respond and I was only able to leave a message.  Patient also said that she was not going to eat as she is concerned someone is poisoning her food.  Patient refused to engage in any conversation because she was not able to communicate with her family.  At that time patient stated if " by 1 PM she is not able to communicate with her brother-in-law or sister she was going to demand to leave the hospital.     Patient was able to communicate with her family later on during the day.  I met again with the patient late in the afternoon and patient only stated that sometimes she feels very paranoid and believes things that are not true.  Again patient refused to give me details about her symptoms.  After reviewing with the patient her treatment plan patient informed to me that she is in agreement with starting Zyprexa 2.5 mg at bedtime and increasing this medication to 5 mg at bedtime.  Patient said that in the past she has been admitted to [facility] and [facility] (both hospitals are located in [facility]).  Currently patient denies having any thoughts about harming herself or harming others.     I explained to the patient that at this time I was going to change her legal status to voluntary as she is in agreement with taking her medications, staying in the hospital and work on a safe discharge plan.  I also informed the patient that we have asks for an  to help her while she is here in the hospital.  At the end the patient verbalized good understanding and she was in agreement with the plan.     Labs ordered and they are abnormal.  Hemoglobin A1c is 9.3 and TSH is 69.88.  I have sent a message to the medical nurse practitioner.     As per ER notes:  11/9/2021  Linnette Monsivais 1950      [facility] Crisis Assessment:    Started at: 12:25pm  Completed at: 1:25pm  Patient was assessed via virtually ([employer] cart or other teleconferencing device).  Patient Location: [facility] ER     Chief Complaint and History of Presenting Problem:     Patient is a 71 year old  and  female who presented to the ED by Family/Friends related to concerns for worsening of paranoia, anxiety, difficulty sleeping and daily functioning.  Pt has a history of paranoid schizophrenia  and medication non-adherence.  Pt was  and lived in [place] alone by herself.  Pt's sister and brother in-law concerned about Pt's well being as they brought her to their home in [place], [place] about a week ago to get help for her.  Pt reported she stopped taking her Zyprexa back in March of this year as the medication made her difficulty to move her arms and legs.  Pt denied having depression symptoms, but endorsed increased anxiety symptoms.  Pt reported difficulty sleeping and loss of appetite.  Pt endorsed increased paranoia as she felt someone was watching her, following her and going to kill her.  Pt noted being in a constant state of fear, as she cannnot sleep at night and pacing in her sister's home.  Pt also pacing with a  knife in her hand at her sister's home to protect herself from intruders.  Pt denied having auditory hallucination but endorsed in visual hallucination of seeing someone going to harm her and shadows on TV.  Pt denied having suicidal and homicidal ideations.  However, Pt noted she will protect herself by harming someone if someone was going to harm her.     Assessment and intervention involved meeting with pt, obtaining collateral from [facility] and [facility] records and Yuri Mercado MD, employing crisis psychotherapy including: Establishing rapport, Active listening, Assess dimensions of crisis, Apply solution-focused therapy to address current crisis, Identify additional supports and alternative coping skills, Motivational Interviewing, Brief Supportive Therapy and Trauma-Informed Care. Collateral information includes Pt's brother in-law, Sarbjit Arriaza [phone] who was present in the ER. Sarbjit reported Pt has not been sleeping at night due to increased paranoia and fear of someone going to harm her.  Sarbjit reported Pt has been pacing and walking back and forth a lot in his home.  Sarbjit reported Pt was walking with a  knife in her hand to protect herself and  family from intruders.  Sarbjit preferred inpatient service for Pt for further stabilization, safety assessment and medication management.     Biopsychosocial Background and Demographic Information     Pt reported her both parents have passed away and had 4 siblings.  Pt noted two of her siblings also have passed away.  Pt reported she was  and has no children.  Pt reported she was living in [place] and alone as she was retired.  Pt shared she did not have much of daily routine and structure other than listening to music.     Mental Health History and Current Symptoms      Pt reported she was diagnosed with paranoid schizophrenia in 1998 in [place].  Pt denied having depression symptoms, but endorsed increased anxiety symptoms.  Pt reported difficulty sleeping and loss of appetite.  Pt endorsed increased paranoia as she felt someone was watching her, following her and going to kill her.  Pt noted being in a constant state of fear, as she cannnot sleep at night and pacing in her sister's home.  Pt also pacing with a  knife in her hand at her sister's home to protect herself from intruders.  Pt denied having auditory hallucination but endorsed in visual hallucination of seeing someone going to harm her and shadows on TV.  Pt denied having suicidal and homicidal ideations.  However, Pt noted she will protect herself by harming someone if someone was going to harm her.     Patient identifies historical diagnoses of paranoid schizophrenia. At baseline, patient describes their mental health symptoms as struggling.      Mental Health History (prior psychiatric hospitalizations, civil commitments, programmatic care, etc):Pt reported a history of psychiatric hospitalization in 1998 in [place] for 40 days.  Family Mental and Chemical Health History: None reported.       HOSPITAL COURSE   Admitted due to aforementioned presentation.  Education regarding diagnostic and treatment options with risks, benefits and  alternatives and adequate verbalization of understanding.  Discussed reviewed in further detail, stressors and events leading to presentation.    Admitted on a voluntary status     Patient was started on a multimodal therapy that included medications.  After reviewing with the patient, family, past psychiatric history and previous admissions to [facility] and [facility] in [facility].  Dr. Winter started patient on Zyprexa 2.5 mg at bedtime and gradually increased it to 15 mg at bedtime (current dose). In the beginning patient did not agree with Dr. Winter to have her Zyprexa increased. Eventually, patient agreed to the treatment plan and was able to tolerate the medication well. Patient denied having any side effects from it.   Due to her paranoid, patient refused a . However, patient was able to communicate well in English with the staff. Patient communicated with Dr. Winter and this writer in Norwegian. Both Dr. Winter and writer are fluent in Norwegian.     For the past couple of weeks patient reported seeing threatening messages on the TV about someone wanting to kill her and her family. Patient also reported getting similar messages in salsa packets. During one of the assessments, patient reported that someone went into her room in the middle of the night trying to harm her. Patient had a minor scratch that was present since the admission. Patient was paranoid in one occasion about getting her blood pressure checked and scanning her wristband stating that she was seeing number representing the amount of people that will die including her.  Patient has been compliant with Zyprexa and her symptoms have been gradually improving. Patient's thought processes are more clear, linear and goal directed. Her delusion and paranoid have significantly improved. Patient has denied seeing any threatening messages on the TV this week, or any similar messages in salsa packets.     Patient is  "looking forward to continue her treatment in the outpatient setting. Patient contracted for safety. Patient stated feeling happy about going home today but a little anxious and afraid that the thoughts will come back. Dr. Winter reassured the patient that she is safe at home with her family, and that she will continue her current treatment. Patient will also see outpatient psychiatry to monitor her symptoms. Patient understands the safety plan and is aware that if at any time she feels like harming herself or harming others she can call 911 or go to any emergency room.  Patient denies having any suicidal ideations or homicidal ideations, she has been able to contract for safety.  Currently the patient denies having any hallucinations and no delusions have been elicited. During the hospital stay patient has not demonstrated any aggressive or self-injurious behaviors and this has been consistent with my observations and the observations of the staff.     Throughout the hospital stay patient was seen and evaluated by the service of internal medicine, occupational therapy and physical therapy.   Medical team has continue following up with the patient and has been managing the medications for diabetes and hypothyroidism    We also have been in constant communication with patient's family that at this time are in agreement with the patient returning home.  has been coordinating with patient's family a safe discharge plan including partial hospitalization program and outpatient psychiatry.     Currently the patient denies any safety concerns, she is medically and psychiatrically clear in order to return home with family today.     MENTAL STATUS EXAM   Vitals: /63 (BP Location: Right arm, Patient Position: Sitting)   Pulse 77   Temp 98.1  F (36.7  C) (Oral)   Resp 16   Wt 54.7 kg (120 lb 11.2 oz)   SpO2 98%     Mental Status:  Appearance:  No apparent distress  Mood:  \"I am happy to go home " "today\"  Affect: appropriate was was congruent to speech  Suicidal Ideation: absent  Homicidal Ideation: absent  Thought process: improving, more linear and less delusional  Thought content: has improved significantly, her thoughts are less intrusive   Fund of Knowledge: Average  Attention/Concentration: Normal  Language ability:  Intact  Memory:  Immediate recall intact, Short-term memory intact and Long-term memory intact  Insight:  fair.  Judgement: fair  Orientation: Yes, x4  Psychomotor Behavior: normal or unremarkable    Muscle Strength and Tone: MuscleStrength: Normal  Gait and Station: Normal         DISCHARGE MEDICATIONS   Discharge Medication Options:   Current Discharge Medication List      START taking these medications    Details   !! blood glucose (NO BRAND SPECIFIED) lancets standard Use to test blood sugar  2 times daily as directed. To accompany glucose monitor brands per insurance coverage.  Qty: 100 each, Refills: 0    Comments: SoftClix lancets, 1 box  Associated Diagnoses: Type 2 diabetes mellitus without complication, with long-term current use of insulin (H)      !! blood glucose (NO BRAND SPECIFIED) lancets standard Use to test blood sugar two times daily. Dispense per insurance coverage.  Qty: 100 each, Refills: 0    Comments: SoftClix lancets, 1 box  Associated Diagnoses: Type 2 diabetes mellitus without complication, with long-term current use of insulin (H)      !! blood glucose (NO BRAND SPECIFIED) test strip Use to test blood sugar 2 times daily  Qty: 100 strip, Refills: 0    Comments: Accuchek Guide strips, 2 boxes of 50  Associated Diagnoses: Type 2 diabetes mellitus without complication, with long-term current use of insulin (H)      !! blood glucose (NO BRAND SPECIFIED) test strip Use to test blood sugar two times daily. Dispense per insurance coverage.  Qty: 100 strip, Refills: 0    Comments: Accuchek Guide strips, 2 boxes of 50  Associated Diagnoses: Type 2 diabetes mellitus " without complication, with long-term current use of insulin (H)      !! blood glucose monitoring (NO BRAND SPECIFIED) meter device kit Check blood glucose 2 times daily.  Qty: 1 kit, Refills: 0    Comments: Accu chek meter kit. Per insurance coverage  Associated Diagnoses: Type 2 diabetes mellitus without complication, with long-term current use of insulin (H)      !! blood glucose monitoring (NO BRAND SPECIFIED) meter device kit Check blood sugar twice daily. Dispense Per insurance coverage  Qty: 1 kit, Refills: 0    Associated Diagnoses: Type 2 diabetes mellitus without complication, with long-term current use of insulin (H)      glucose (BD GLUCOSE) 4 g chewable tablet Take 4 tablets by mouth every 15 minutes as needed for low blood sugar  Qty: 50 tablet, Refills: 0    Associated Diagnoses: Type 2 diabetes mellitus without complication, with long-term current use of insulin (H)      insulin pen needle (32G X 4 MM) 32G X 4 MM miscellaneous Single Use.  Qty: 100 each, Refills: 0    Comments: Per insurance coverage  Associated Diagnoses: Type 2 diabetes mellitus without complication, with long-term current use of insulin (H)      OLANZapine zydis (ZYPREXA) 15 MG ODT Take 1 tablet (15 mg) by mouth At Bedtime  Qty: 30 tablet, Refills: 0    Associated Diagnoses: Paranoid schizophrenia, chronic condition with acute exacerbation (H)       !! - Potential duplicate medications found. Please discuss with provider.      CONTINUE these medications which have CHANGED    Details   amLODIPine (NORVASC) 5 MG tablet Take 1 tablet (5 mg) by mouth daily  Qty: 30 tablet, Refills: 0    Associated Diagnoses: Primary hypertension      aspirin 81 MG EC tablet Take 1 tablet (81 mg) by mouth daily  Qty: 30 tablet, Refills: 0    Associated Diagnoses: Primary hypertension      insulin glargine (LANTUS PEN) 100 UNIT/ML pen Inject 10 Units Subcutaneous At Bedtime  Qty: 15 mL, Refills: 0    Comments: If Lantus is not covered by insurance, may  substitute Basaglar at same dose and frequency.    Associated Diagnoses: Type 2 diabetes mellitus without complication, with long-term current use of insulin (H)      levothyroxine (SYNTHROID/LEVOTHROID) 25 MCG tablet Take 1 tablet (25 mcg) by mouth every morning (before breakfast)  Qty: 30 tablet, Refills: 0    Associated Diagnoses: Acquired hypothyroidism      rosuvastatin (CRESTOR) 5 MG tablet Take 1 tablet (5 mg) by mouth daily  Qty: 30 tablet, Refills: 0    Associated Diagnoses: Hyperlipidemia LDL goal <130      sitagliptin (JANUVIA) 100 MG tablet Take 1 tablet (100 mg) by mouth daily  Qty: 30 tablet, Refills: 0    Associated Diagnoses: Type 2 diabetes mellitus without complication, with long-term current use of insulin (H)         CONTINUE these medications which have NOT CHANGED    Details   calcium-vitamin D (OSCAL) 250-125 MG-UNIT TABS per tablet Take 1 tablet by mouth 2 times daily         STOP taking these medications       Ertugliflozin L-PyroglutamicAc (STEGLATRO) 5 MG TABS Comments:   Reason for Stopping:               Medication adherence issues: MS Med Adherence Y/N: No  Medication side effects: MEDICATION SIDE EFFECTS: no side effects reported         DISCHARGE PLAN   1.  Education given regarding diagnostic and treatment options with risks, benefits and alternatives with adequate verbalization of understanding.  2.  Discharge to home with family.  Upon detailed review of risk factors, patient amenable for release.   3.  Continue aforementioned medications and associated medication changes with follow-up by outpatient mental health provider.  4.  Crisis management planning in place.      Patient has been seen and evaluated with physician assistant student, I agree with her assessment and evaluation of the patient.    Nursing and  to review further discharge recommendations.     TOTAL TIME:  Greater than 30 minutes for discharge planning.    This note was created with help of Dragon  dictation system. Grammatical / typing errors are not intentional.    CARMELO FlahertyS

## 2021-12-02 NOTE — PLAN OF CARE
Patient was calm and slept most part of the shift, safety checks were conducted every 15 minutes to ensure patients safety. No pain was reported and patient denied all psych symptoms. BS was 79,  and she was medication compliant.

## 2021-12-02 NOTE — PLAN OF CARE
Discharge plan or goal: Discharge home with outpatient mental health supports.     Today's Plan: Writer met with pt on this date and consulted with psychiatrist. Pt states that she feels ready to discharge home. Pt denies safety concerns including SI/HI/SIB. Pt states that she feels safe to return home. Crisis resources were reviewed and included in AVS. Pt states that if she were to experience a mental health crisis, she would feel most comfortable going to an emergency department to be assessed. Pt states that she also understands the importance of continuing to take her medications. Pt states that she feels supported by her brother-in-law and her sister Rosie. Pt denies having questions or concerns regarding follow-up appointments. Pt will be transported home by brother-in-law and medications will be filled at the [facility] pharmacy.     Pt has the following outpatient appointment(s) scheduled:     Medical Follow-up appointment: Myesha GARCIAS on Monday December 6th  at 10:20 a.m..   Address: [address] [place], [place] [address] [address]  Phone: [phone]   Bring the following to your appointment: all medications, photo id, insurance card, co-pay (if applicable) and discharge paper work from hospital to appt. If you need to change or cancel appt, please call your clinic.    Appointment Type: Outpatient psychiatry appointment   Provider: CHRIST Bacon   Date & Time: Wednesday, December 15th, 2021 @ 1:20 PM   Clinic: [facility]   Address: [address], [place] [address]   Phone: [phone]  Additional Notes: This is an in person appointment, please arrive 30 minutes prior to appointment to complete paperwork.      Appointment Type: Outpatient psychiatry appointment   Provider: CHRIST Bacon   Date & Time: Friday, January 14th, 2021 @ 1:20 PM   Clinic: [facility]   Address: [address], [place]  [address]   Phone: [phone]  Additional Notes: This is an in person appointment.     Pt has the following professional community support(s):   -PCP  -Outpatient psychiatrist    Legal Status: Voluntary     Diagnosis/Procedure:   Patient Active Problem List   Diagnosis     Psychosis (H)     Paranoid schizophrenia, chronic condition with acute exacerbation (H)     Type 2 diabetes mellitus without complication, with long-term current use of insulin (H)     Acquired hypothyroidism     Hyperlipidemia LDL goal <130     Personal history of noncompliance with medical treatment, presenting hazards to health       Care Rounds Attendance:   [facility]   Charge RN   OT/TR  MD/BRET [facility], 12/2/2021, 8:43 AM

## 2021-12-02 NOTE — PLAN OF CARE
Occupational Therapy Discharge Note    Problem: Coping with Symptoms  Goal: Identify Coping Skills  Description: Patient will identify 3 healthy coping skills to manage stress and reduce symptoms this review period    Outcome: Completed     Patient Name:  Linnette Monsivais    D: Refer to daily doc flowsheets for details of progress toward goals.  A: Improvement seen in identifying healthy coping skills to manage stress and reduce symptoms.   P: Patient discharging to brother in law/sister's home with with psychiatry. Discontinue OT Care Plan goals and interventions.

## 2021-12-02 NOTE — PLAN OF CARE
Goal: Adheres to Safety Considerations for Self and Others  Outcome: Improving  Goal: Develops/Participates in Therapeutic [place] to Support Successful Transition  Outcome: Improving     Pt is pleasant, cooperative with care. Visible on unit, socialized with selective peers. Vital signs within normal limit. Denied SI/HI, Hallucinations/delusions. Rated anxiety and depression zero. Denied pain.   BG checks-144 and 220. The 144 BG @ dinner refused to duck or show on result review. Med compliant and contracted for safety.    Possible discharge tomorrow, per report. Will continue to evaluate.

## 2021-12-02 NOTE — PLAN OF CARE
Patient continues to improve daily and her paranoia is decreased at this time, She continues to be med compliant and interactions with staff and peers is appropriate. She looks forward to discharge to home and is confident she has the support from family and community to stay healthy. She has denied SI/HI and rates depression 2/10 and anxiety 2/10, able to contract for safety   Problem: Behavioral Health Plan of Care  Goal: Plan of Care Review  Outcome: Improving  Flowsheets  Taken 12/2/2021 0949 by Jean Carlos Thurman, RN  Plan of Care Reviewed With: patient  Progress: improving  Taken 12/1/2021 1800 by Evi Melgoza, RN  Patient Agreement with Plan of Care: agrees

## 2021-12-02 NOTE — PROGRESS NOTES
12/02/21 1026   Engagement   Intervention Group   Topic Detail Card game for turn taking, leisure, sequencing, following instructions, and coping with sx through distraction   Attendance Did not attend   Reason for Not Attending Refused  (Pt reported she was preparing for discharge)   Supervision   Supervision On-site supervision provided

## 2021-12-02 NOTE — PROGRESS NOTES
Patient discharge instructions have been read to her and she is understanding of the information given. This information was also shared with brother who is primary caregiver. Patient continues to deny SI/HI and admits to decreased paranoia at this time. Patient continues to contract for safety. She has acknowledged receipt of all her belongings. .

## 2021-12-03 LAB — GLUCOSE BLDC GLUCOMTR-MCNC: 144 MG/DL (ref 70–99)

## 2021-12-06 ENCOUNTER — OFFICE VISIT (OUTPATIENT)
Dept: FAMILY MEDICINE | Facility: CLINIC | Age: 71
End: 2021-12-06
Payer: COMMERCIAL

## 2021-12-06 VITALS
BODY MASS INDEX: 24.37 KG/M2 | WEIGHT: 120.9 LBS | DIASTOLIC BLOOD PRESSURE: 60 MMHG | HEIGHT: 59 IN | RESPIRATION RATE: 16 BRPM | SYSTOLIC BLOOD PRESSURE: 120 MMHG | TEMPERATURE: 98.8 F | HEART RATE: 74 BPM | OXYGEN SATURATION: 99 %

## 2021-12-06 DIAGNOSIS — Z00.00 HEALTHCARE MAINTENANCE: ICD-10-CM

## 2021-12-06 DIAGNOSIS — E11.9 TYPE 2 DIABETES MELLITUS WITHOUT COMPLICATION, WITH LONG-TERM CURRENT USE OF INSULIN (H): Primary | ICD-10-CM

## 2021-12-06 DIAGNOSIS — E03.9 ACQUIRED HYPOTHYROIDISM: ICD-10-CM

## 2021-12-06 DIAGNOSIS — F20.0 PARANOID SCHIZOPHRENIA, CHRONIC CONDITION WITH ACUTE EXACERBATION (H): ICD-10-CM

## 2021-12-06 DIAGNOSIS — I10 PRIMARY HYPERTENSION: ICD-10-CM

## 2021-12-06 DIAGNOSIS — H57.89 EYE IRRITATION: ICD-10-CM

## 2021-12-06 DIAGNOSIS — Z79.4 TYPE 2 DIABETES MELLITUS WITHOUT COMPLICATION, WITH LONG-TERM CURRENT USE OF INSULIN (H): Primary | ICD-10-CM

## 2021-12-06 DIAGNOSIS — E78.5 HYPERLIPIDEMIA LDL GOAL <130: ICD-10-CM

## 2021-12-06 PROCEDURE — 99204 OFFICE O/P NEW MOD 45 MIN: CPT | Performed by: PHYSICIAN ASSISTANT

## 2021-12-06 RX ORDER — AMLODIPINE BESYLATE 5 MG/1
5 TABLET ORAL DAILY
Qty: 90 TABLET | Refills: 3 | Status: SHIPPED | OUTPATIENT
Start: 2021-12-06 | End: 2022-09-19

## 2021-12-06 RX ORDER — LEVOTHYROXINE SODIUM 25 UG/1
25 TABLET ORAL
Qty: 30 TABLET | Refills: 1 | Status: SHIPPED | OUTPATIENT
Start: 2021-12-06 | End: 2022-01-24

## 2021-12-06 RX ORDER — ASPIRIN 81 MG/1
81 TABLET ORAL DAILY
Qty: 90 TABLET | Refills: 3 | Status: SHIPPED | OUTPATIENT
Start: 2021-12-06 | End: 2022-10-31

## 2021-12-06 RX ORDER — ROSUVASTATIN CALCIUM 5 MG/1
5 TABLET, COATED ORAL DAILY
Qty: 90 TABLET | Refills: 3 | Status: SHIPPED | OUTPATIENT
Start: 2021-12-06 | End: 2022-09-19

## 2021-12-06 ASSESSMENT — PAIN SCALES - GENERAL: PAINLEVEL: NO PAIN (0)

## 2021-12-06 ASSESSMENT — MIFFLIN-ST. JEOR: SCORE: 969.03

## 2021-12-06 NOTE — PROGRESS NOTES
Assessment & Plan     Type 2 diabetes mellitus without complication, with long-term current use of insulin (H)  Plan to continue januvia and lantus with the following plan:  Check blood sugars in the morning before eating.  Current dose of insulin is 10 units per day.  I would like you to increase your daily dose of insulin by 2 units every 3 days and continue to monitor your blood sugars in the morning before eating.  Goal blood sugar in the morning before eatin-130  When your blood sugar in the morning before eating is between , stop adjusting dose of insulin and continue on same dose.  Do not go above 16 units per day for now.  She will keep a log of blood sugars and follow-up in 1 month to recheck.  - blood glucose (NO BRAND SPECIFIED) lancets standard; Use to test blood sugar  2 times daily as directed. To accompany glucose monitor brands per insurance coverage.  - blood glucose (NO BRAND SPECIFIED) test strip; Use to test blood sugar 2 times daily  - insulin glargine (LANTUS PEN) 100 UNIT/ML pen; Inject 10 Units Subcutaneous At Bedtime Increase daily dose by 2 units every 3 days until morning fasting blood sugars are between 80 and 130.  - sitagliptin (JANUVIA) 100 MG tablet; Take 1 tablet (100 mg) by mouth daily    Paranoid schizophrenia, chronic condition with acute exacerbation (H)  Doing better since hospitalization. Has appointment with psychiatry on 12/15/21.    Eye irritation  Reports chronic eye irritation. Follow-up with eye.  - Adult Eye Referral; Future    Primary hypertension  BP well controlled. Continue current treatment plan. Refills given.  - amLODIPine (NORVASC) 5 MG tablet; Take 1 tablet (5 mg) by mouth daily  - aspirin 81 MG EC tablet; Take 1 tablet (81 mg) by mouth daily    Acquired hypothyroidism  Refill given. Follow-up to recheck TSH in 1 month.  - levothyroxine (SYNTHROID/LEVOTHROID) 25 MCG tablet; Take 1 tablet (25 mcg) by mouth every morning (before  breakfast)    Hyperlipidemia LDL goal <130  Chronic, continue current treatment plan. Refills given.  - rosuvastatin (CRESTOR) 5 MG tablet; Take 1 tablet (5 mg) by mouth daily    Healthcare maintenance  States she did see PCP regularly in [place] and was up to date with colonoscopy and health maintenance. DANNIE completed and we will review records. She will return in 1 month for physical.    Return in about 1 month (around 1/6/2022) for Preventive Physical Exam, Med Check, Lab Work.    Myesha Crenshaw PA-C  [facility]    John Anglin is a 71 year old who presents for the following health issues  accompanied by her sister.    [facility]       Hospital Follow-up Visit: She is doing better. She has some fear still    Hospital/Nursing Home/IP Rehab Facility: [facility]  Date of Admission: 11/09/2021  Date of Discharge: 12/02/2021  Reason(s) for Admission: Paranoid schizophrenia, chronic condition with acute exacerbation      Was your hospitalization related to COVID-19? No   Problems taking medications regularly:  None  Medication changes since discharge: See medication list. Updated  Problems adhering to non-medication therapy:  None    Summary of hospitalization:  [facility] discharge summary reviewed  Diagnostic Tests/Treatments reviewed.  Follow up needed: none  Other Healthcare Providers Involved in Patient s Care:         Specialist appointment - psychiatry  Update since discharge: improved.   Post Discharge Medication Reconciliation: discharge medications reconciled, continue medications without change.  Plan of care communicated with patient and family     Linnette recently moved here from [place] to live with her sister after her mental health was really struggling. She is a  and had been living in [place] by herself but not doing well. Her sister brought her here and realized how much she was struggling so brought her to the ER  and she was hospitalized from 11/9/ - 12/2.    She is doing much better following her hospitalization. Started back on zyprexa. She feels much less paranoid and is able to sleep. She has an appointment with psychiatry on 12/15. Her sister is making sure she is taking all of her medication and Linnette feels safe. No HI/SI. She does still have some worries at night about someone breaking into the house but her sister says this is about 95% improved from when she was first hospitalized.    Medical issues during hospitalization included T2DM which had previously been controlled with insulin glargine and januvia 100 mg daily. She says that trulicity injections and ertugiflozin were started in October but she never took it because her doctor in [place] did not explain what it was for. Per hospital notes, she said she was on 42 units of lantus but was having low blood sugars so it was decreased to 20 units and eventually increased to 28 units daily. A1c was 9.3% upon hospitalization. In the hospital, she was started back on januvia 100 mg daily and 10 mg of lantus. She has been monitoring her blood sugars twice daily. She does not have a log of her sugars, but states that her AM fasting blood sugar was 135 yesterday and PM blood sugar was 157.    She also has a history of hypothyroidism and was previously on levothyroxine. She was off of her medication for several months. TSH was high at admission 69.88. Levothyroxine restarted at 25 mcg daily. Needs TSH with free T4 in 4 to 6 weeks.    History of hyperlipidemia and she had been on a statin in the past so Crestor was restarted during admission.    History of hypertension and started back on amlodipine 5 mg daily.    Review of Systems   Constitutional, HEENT, cardiovascular, pulmonary, gi and gu systems are negative, except as otherwise noted.      Objective    /60 (BP Location: Right arm, Patient Position: Sitting, Cuff Size: Adult Regular)   Pulse 74   Temp  "98.8  F (37.1  C) (Oral)   Resp 16   Ht 1.499 m (4' 11\")   Wt 54.8 kg (120 lb 14.4 oz)   SpO2 99%   BMI 24.42 kg/m    Body mass index is 24.42 kg/m .  Physical Exam   GENERAL: healthy, alert and no distress  RESP: lungs clear to auscultation - no rales, rhonchi or wheezes  CV: regular rate and rhythm, normal S1 S2, no S3 or S4, no murmur, click or rub, no peripheral edema and peripheral pulses strong  NEURO: Normal strength and tone, mentation intact and speech normal  PSYCH: mentation appears normal, affect normal/bright, good eye contact, pleasant    Admission on 11/09/2021, Discharged on 12/02/2021   Component Date Value Ref Range Status     hCG Urine Qualitative 11/11/2021 Negative  Negative Final    This test is for screening purposes.  Results should be interpreted along with the clinical picture.  Confirmation testing is available if warranted by ordering [id number], HCG Quantitative Pregnancy.     Sodium 11/10/2021 141  136 - 145 mmol/L Final     Potassium 11/10/2021 3.9  3.5 - 5.0 mmol/L Final     Chloride 11/10/2021 105  98 - 107 mmol/L Final     Carbon Dioxide (CO2) 11/10/2021 30  22 - 31 mmol/L Final     Anion Gap 11/10/2021 6  5 - 18 mmol/L Final     Urea Nitrogen 11/10/2021 25  8 - 28 mg/dL Final     Creatinine 11/10/2021 0.74  0.60 - 1.10 mg/dL Final     Calcium 11/10/2021 9.3  8.5 - 10.5 mg/dL Final     Glucose 11/10/2021 115  70 - 125 mg/dL Final     Alkaline Phosphatase 11/10/2021 108  45 - 120 U/L Final     AST 11/10/2021 18  0 - 40 U/L Final     ALT 11/10/2021 20  0 - 45 U/L Final     Protein Total 11/10/2021 6.5  6.0 - 8.0 g/dL Final     Albumin 11/10/2021 3.1* 3.5 - 5.0 g/dL Final     Bilirubin Total 11/10/2021 0.8  0.0 - 1.0 mg/dL Final     GFR Estimate 11/10/2021 82  >60 mL/min/1.73m2 Final    As of July 11, 2021, eGFR is calculated by the CKD-EPI creatinine equation, without race adjustment. eGFR can be influenced by muscle mass, exercise, and diet. The reported eGFR is an estimation only " and is only applicable if the renal function is stable.     TSH 11/10/2021 69.88* 0.30 - 5.00 uIU/mL Final     Color Urine 11/11/2021 Yellow  Colorless, Straw, Light Yellow, Yellow Final     Appearance Urine 11/11/2021 Clear  Clear Final     Glucose Urine 11/11/2021 50 * Negative mg/dL Final     Bilirubin Urine 11/11/2021 Negative  Negative Final     Ketones Urine 11/11/2021 Negative  Negative mg/dL Final     Specific Gravity Urine 11/11/2021 1.026  1.001 - 1.030 Final     Blood Urine 11/11/2021 Negative  Negative Final     pH Urine 11/11/2021 5.5  5.0 - 7.0 Final     Protein Albumin Urine 11/11/2021 Negative  Negative mg/dL Final     Urobilinogen Urine 11/11/2021 <2.0  <2.0 mg/dL Final     Nitrite Urine 11/11/2021 Negative  Negative Final     Leukocyte Esterase Urine 11/11/2021 Negative  Negative Final     Cholesterol 11/10/2021 174  <=199 mg/dL Final     Triglycerides 11/10/2021 82  <=149 mg/dL Final     Direct Measure HDL 11/10/2021 78  >=50 mg/dL Final    HDL Cholesterol Reference Range:     0-2 years:   No reference ranges established for patients under 2 years old  at [facility] for lipid analytes.    2-8 years:  Greater than 45 mg/dL     18 years and older:   Female: Greater than or equal to 50 mg/dL   Male:   Greater than or equal to 40 mg/dL     LDL Cholesterol Calculated 11/10/2021 80  <=129 mg/dL Final     Hemoglobin A1C 11/10/2021 9.3* <=5.6 % Final      Prediabetes: 5.7 to 6.4%        Diabetes:  >=6.5%     Patients with Hgb F >5%, total bilirubin >10.0 mg/dL, abnormal red cell turnover, severe renal or hepatic disease or malignancy should not have this A1C method used to diagnose or monitor diabetes.      Ventricular Rate 11/10/2021 76  BPM Final     Atrial Rate 11/10/2021 76  BPM Final     IL Interval 11/10/2021 140  ms Final     QRS Duration 11/10/2021 74  ms Final     QT 11/10/2021 394  ms Final     QTc 11/10/2021 443  ms Final     P Axis 11/10/2021 40  degrees Final     R AXIS  11/10/2021 -32  degrees Final     T Axis 11/10/2021 48  degrees Final     Interpretation ECG 11/10/2021    Final                    Value:Sinus rhythm  Left axis deviation  Abnormal ECG  No previous ECGs available  Confirmed by JOSLYN MCFADDEN MD LOC:[facility] ([id number]) on 11/10/2021 10:28:05 AM       WBC Count 11/10/2021 6.8  4.0 - 11.0 10e3/uL Final     RBC Count 11/10/2021 4.33  3.80 - 5.20 10e6/uL Final     Hemoglobin 11/10/2021 13.1  11.7 - 15.7 g/dL Final     Hematocrit 11/10/2021 38.9  35.0 - 47.0 % Final     MCV 11/10/2021 90  78 - 100 fL Final     MCH 11/10/2021 30.3  26.5 - 33.0 pg Final     MCHC 11/10/2021 33.7  31.5 - 36.5 g/dL Final     RDW 11/10/2021 13.6  10.0 - 15.0 % Final     Platelet Count 11/10/2021 226  150 - 450 10e3/uL Final     % Neutrophils 11/10/2021 52  % Final     % Lymphocytes 11/10/2021 38  % Final     % Monocytes 11/10/2021 6  % Final     % Eosinophils 11/10/2021 3  % Final     % Basophils 11/10/2021 1  % Final     % Immature Granulocytes 11/10/2021 0  % Final     NRBCs per 100 WBC 11/10/2021 0  <1 /100 Final     Absolute Neutrophils 11/10/2021 3.5  1.6 - 8.3 10e3/uL Final     Absolute Lymphocytes 11/10/2021 2.6  0.8 - 5.3 10e3/uL Final     Absolute Monocytes 11/10/2021 0.4  0.0 - 1.3 10e3/uL Final     Absolute Eosinophils 11/10/2021 0.2  0.0 - 0.7 10e3/uL Final     Absolute Basophils 11/10/2021 0.1  0.0 - 0.2 10e3/uL Final     Absolute Immature Granulocytes 11/10/2021 0.0  <=0.0 10e3/uL Final     Absolute NRBCs 11/10/2021 0.0  10e3/uL Final     GLUCOSE BY METER POCT 11/10/2021 385* 70 - 99 mg/dL Final     GLUCOSE BY METER POCT 11/10/2021 204* 70 - 99 mg/dL Final     GLUCOSE BY METER POCT 11/11/2021 156* 70 - 99 mg/dL Final     GLUCOSE BY METER POCT 11/11/2021 188* 70 - 99 mg/dL Final     GLUCOSE BY METER POCT 11/11/2021 210* 70 - 99 mg/dL Final     GLUCOSE BY METER POCT 11/11/2021 283* 70 - 99 mg/dL Final     GLUCOSE BY METER POCT 11/12/2021 232* 70 - 99 mg/dL Final     GLUCOSE BY METER POCT  11/12/2021 219* 70 - 99 mg/dL Final     GLUCOSE BY METER POCT 11/12/2021 130* 70 - 99 mg/dL Final     GLUCOSE BY METER POCT 11/12/2021 254* 70 - 99 mg/dL Final     GLUCOSE BY METER POCT 11/13/2021 205* 70 - 99 mg/dL Final     GLUCOSE BY METER POCT 11/13/2021 193* 70 - 99 mg/dL Final     GLUCOSE BY METER POCT 11/13/2021 230* 70 - 99 mg/dL Final     GLUCOSE BY METER POCT 11/13/2021 295* 70 - 99 mg/dL Final     GLUCOSE BY METER POCT 11/14/2021 191* 70 - 99 mg/dL Final     GLUCOSE BY METER POCT 11/14/2021 282* 70 - 99 mg/dL Final     GLUCOSE BY METER POCT 11/14/2021 193* 70 - 99 mg/dL Final     GLUCOSE BY METER POCT 11/14/2021 310* 70 - 99 mg/dL Final     GLUCOSE BY METER POCT 11/15/2021 105* 70 - 99 mg/dL Final     GLUCOSE BY METER POCT 11/15/2021 183* 70 - 99 mg/dL Final     GLUCOSE BY METER POCT 11/15/2021 185* 70 - 99 mg/dL Final     GLUCOSE BY METER POCT 11/15/2021 246* 70 - 99 mg/dL Final     GLUCOSE BY METER POCT 11/16/2021 92  70 - 99 mg/dL Final     GLUCOSE BY METER POCT 11/16/2021 109* 70 - 99 mg/dL Final     GLUCOSE BY METER POCT 11/16/2021 264* 70 - 99 mg/dL Final     GLUCOSE BY METER POCT 11/16/2021 237* 70 - 99 mg/dL Final     GLUCOSE BY METER POCT 11/17/2021 123* 70 - 99 mg/dL Final     GLUCOSE BY METER POCT 11/17/2021 161* 70 - 99 mg/dL Final     GLUCOSE BY METER POCT 11/17/2021 179* 70 - 99 mg/dL Final     GLUCOSE BY METER POCT 11/17/2021 241* 70 - 99 mg/dL Final     GLUCOSE BY METER POCT 11/18/2021 113* 70 - 99 mg/dL Final     GLUCOSE BY METER POCT 11/18/2021 98  70 - 99 mg/dL Final     GLUCOSE BY METER POCT 11/18/2021 202* 70 - 99 mg/dL Final     GLUCOSE BY METER POCT 11/18/2021 247* 70 - 99 mg/dL Final     GLUCOSE BY METER POCT 11/19/2021 135* 70 - 99 mg/dL Final     GLUCOSE BY METER POCT 11/19/2021 170* 70 - 99 mg/dL Final     GLUCOSE BY METER POCT 11/19/2021 196* 70 - 99 mg/dL Final     GLUCOSE BY METER POCT 11/19/2021 274* 70 - 99 mg/dL Final     GLUCOSE BY METER POCT 11/20/2021 207* 70 - 99 mg/dL  Final     GLUCOSE BY METER POCT 11/20/2021 208* 70 - 99 mg/dL Final     GLUCOSE BY METER POCT 11/20/2021 209* 70 - 99 mg/dL Final     GLUCOSE BY METER POCT 11/20/2021 253* 70 - 99 mg/dL Final     GLUCOSE BY METER POCT 11/21/2021 107* 70 - 99 mg/dL Final     GLUCOSE BY METER POCT 11/21/2021 192* 70 - 99 mg/dL Final     GLUCOSE BY METER POCT 11/21/2021 200* 70 - 99 mg/dL Final     GLUCOSE BY METER POCT 11/21/2021 206* 70 - 99 mg/dL Final     GLUCOSE BY METER POCT 11/22/2021 142* 70 - 99 mg/dL Final     GLUCOSE BY METER POCT 11/22/2021 156* 70 - 99 mg/dL Final     GLUCOSE BY METER POCT 11/22/2021 179* 70 - 99 mg/dL Final     GLUCOSE BY METER POCT 11/22/2021 95  70 - 99 mg/dL Final     GLUCOSE BY METER POCT 11/23/2021 83  70 - 99 mg/dL Final     GLUCOSE BY METER POCT 11/23/2021 187* 70 - 99 mg/dL Final     GLUCOSE BY METER POCT 11/23/2021 135* 70 - 99 mg/dL Final     GLUCOSE BY METER POCT 11/23/2021 188* 70 - 99 mg/dL Final     GLUCOSE BY METER POCT 11/24/2021 78  70 - 99 mg/dL Final     GLUCOSE BY METER POCT 11/24/2021 142* 70 - 99 mg/dL Final     SARS CoV2 PCR 11/24/2021 Negative  Negative Final    NEGATIVE: SARS-CoV-2 (COVID-19) RNA not detected, presumed negative.     GLUCOSE BY METER POCT 11/24/2021 171* 70 - 99 mg/dL Final     GLUCOSE BY METER POCT 11/24/2021 256* 70 - 99 mg/dL Final     GLUCOSE BY METER POCT 11/25/2021 82  70 - 99 mg/dL Final     GLUCOSE BY METER POCT 11/25/2021 114* 70 - 99 mg/dL Final     GLUCOSE BY METER POCT 11/25/2021 151* 70 - 99 mg/dL Final     GLUCOSE BY METER POCT 11/25/2021 174* 70 - 99 mg/dL Final     GLUCOSE BY METER POCT 11/26/2021 88  70 - 99 mg/dL Final     GLUCOSE BY METER POCT 11/26/2021 136* 70 - 99 mg/dL Final     GLUCOSE BY METER POCT 11/26/2021 149* 70 - 99 mg/dL Final     GLUCOSE BY METER POCT 11/26/2021 223* 70 - 99 mg/dL Final     GLUCOSE BY METER POCT 11/27/2021 126* 70 - 99 mg/dL Final     GLUCOSE BY METER POCT 11/27/2021 136* 70 - 99 mg/dL Final     GLUCOSE BY METER  POCT 11/27/2021 219* 70 - 99 mg/dL Final     GLUCOSE BY METER POCT 11/27/2021 187* 70 - 99 mg/dL Final     GLUCOSE BY METER POCT 11/28/2021 77  70 - 99 mg/dL Final     GLUCOSE BY METER POCT 11/28/2021 122* 70 - 99 mg/dL Final     GLUCOSE BY METER POCT 11/28/2021 171* 70 - 99 mg/dL Final     GLUCOSE BY METER POCT 11/28/2021 238* 70 - 99 mg/dL Final     GLUCOSE BY METER POCT 11/29/2021 97  70 - 99 mg/dL Final     GLUCOSE BY METER POCT 11/29/2021 86  70 - 99 mg/dL Final     GLUCOSE BY METER POCT 11/29/2021 136* 70 - 99 mg/dL Final     GLUCOSE BY METER POCT 11/29/2021 174* 70 - 99 mg/dL Final     GLUCOSE BY METER POCT 11/29/2021 182* 70 - 99 mg/dL Final     GLUCOSE BY METER POCT 11/30/2021 100* 70 - 99 mg/dL Final     GLUCOSE BY METER POCT 11/30/2021 132* 70 - 99 mg/dL Final     GLUCOSE BY METER POCT 11/30/2021 234* 70 - 99 mg/dL Final     GLUCOSE BY METER POCT 11/30/2021 236* 70 - 99 mg/dL Final     GLUCOSE BY METER POCT 12/01/2021 76  70 - 99 mg/dL Final     GLUCOSE BY METER POCT 12/01/2021 122* 70 - 99 mg/dL Final     GLUCOSE BY METER POCT 12/01/2021 220* 70 - 99 mg/dL Final     GLUCOSE BY METER POCT 12/02/2021 79  70 - 99 mg/dL Final     GLUCOSE BY METER POCT 12/02/2021 154* 70 - 99 mg/dL Final     GLUCOSE BY METER POCT 12/01/2021 144* 70 - 99 mg/dL Final

## 2021-12-06 NOTE — PATIENT INSTRUCTIONS
Check blood sugars in the morning before eating.  Current dose of insulin is 10 units per day.  I would like you to increase your daily dose of insulin by 2 units every 3 days and continue to monitor your blood sugars in the morning before eating.  Goal blood sugar in the morning before eatin-130  When your blood sugar in the morning before eating is between , stop adjusting dose of insulin and continue on same dose.  Do not go above 16 units per day for now.

## 2021-12-14 ENCOUNTER — TELEPHONE (OUTPATIENT)
Dept: FAMILY MEDICINE | Facility: CLINIC | Age: 71
End: 2021-12-14
Payer: COMMERCIAL

## 2022-01-19 DIAGNOSIS — E03.9 ACQUIRED HYPOTHYROIDISM: ICD-10-CM

## 2022-01-19 DIAGNOSIS — Z91.199 PERSONAL HISTORY OF NONCOMPLIANCE WITH MEDICAL TREATMENT, PRESENTING HAZARDS TO HEALTH: ICD-10-CM

## 2022-01-19 DIAGNOSIS — F20.0 PARANOID SCHIZOPHRENIA, SUBCHRONIC CONDITION (H): Primary | ICD-10-CM

## 2022-01-19 DIAGNOSIS — Z79.4 TYPE 2 DIABETES MELLITUS WITHOUT COMPLICATION, WITH LONG-TERM CURRENT USE OF INSULIN (H): ICD-10-CM

## 2022-01-19 DIAGNOSIS — E11.9 TYPE 2 DIABETES MELLITUS WITHOUT COMPLICATION, WITH LONG-TERM CURRENT USE OF INSULIN (H): ICD-10-CM

## 2022-01-21 ENCOUNTER — OFFICE VISIT (OUTPATIENT)
Dept: FAMILY MEDICINE | Facility: CLINIC | Age: 72
End: 2022-01-21
Payer: COMMERCIAL

## 2022-01-21 VITALS
SYSTOLIC BLOOD PRESSURE: 138 MMHG | HEIGHT: 56 IN | RESPIRATION RATE: 16 BRPM | BODY MASS INDEX: 28.62 KG/M2 | OXYGEN SATURATION: 99 % | WEIGHT: 127.2 LBS | DIASTOLIC BLOOD PRESSURE: 66 MMHG | TEMPERATURE: 98.1 F | HEART RATE: 71 BPM

## 2022-01-21 DIAGNOSIS — I10 PRIMARY HYPERTENSION: ICD-10-CM

## 2022-01-21 DIAGNOSIS — Z78.0 POST-MENOPAUSAL: ICD-10-CM

## 2022-01-21 DIAGNOSIS — R20.0 NUMBNESS AND TINGLING: ICD-10-CM

## 2022-01-21 DIAGNOSIS — Z79.4 TYPE 2 DIABETES MELLITUS WITHOUT COMPLICATION, WITH LONG-TERM CURRENT USE OF INSULIN (H): ICD-10-CM

## 2022-01-21 DIAGNOSIS — Z00.00 ENCOUNTER FOR MEDICARE ANNUAL WELLNESS EXAM: Primary | ICD-10-CM

## 2022-01-21 DIAGNOSIS — Z91.199 PERSONAL HISTORY OF NONCOMPLIANCE WITH MEDICAL TREATMENT, PRESENTING HAZARDS TO HEALTH: ICD-10-CM

## 2022-01-21 DIAGNOSIS — E11.9 TYPE 2 DIABETES MELLITUS WITHOUT COMPLICATION, WITH LONG-TERM CURRENT USE OF INSULIN (H): ICD-10-CM

## 2022-01-21 DIAGNOSIS — E03.9 ACQUIRED HYPOTHYROIDISM: ICD-10-CM

## 2022-01-21 DIAGNOSIS — F20.0 PARANOID SCHIZOPHRENIA, SUBCHRONIC CONDITION (H): ICD-10-CM

## 2022-01-21 DIAGNOSIS — R20.2 NUMBNESS AND TINGLING: ICD-10-CM

## 2022-01-21 DIAGNOSIS — Z11.59 NEED FOR HEPATITIS C SCREENING TEST: ICD-10-CM

## 2022-01-21 DIAGNOSIS — Z12.11 SCREEN FOR COLON CANCER: ICD-10-CM

## 2022-01-21 DIAGNOSIS — Z12.31 VISIT FOR SCREENING MAMMOGRAM: ICD-10-CM

## 2022-01-21 DIAGNOSIS — E78.5 HYPERLIPIDEMIA LDL GOAL <130: ICD-10-CM

## 2022-01-21 LAB
BASOPHILS # BLD AUTO: 0.1 10E3/UL (ref 0–0.2)
BASOPHILS NFR BLD AUTO: 1 %
EOSINOPHIL # BLD AUTO: 0.3 10E3/UL (ref 0–0.7)
EOSINOPHIL NFR BLD AUTO: 4 %
ERYTHROCYTE [DISTWIDTH] IN BLOOD BY AUTOMATED COUNT: 12.7 % (ref 10–15)
HBA1C MFR BLD: 7.7 % (ref 0–5.6)
HCT VFR BLD AUTO: 37.1 % (ref 35–47)
HGB BLD-MCNC: 11.7 G/DL (ref 11.7–15.7)
LYMPHOCYTES # BLD AUTO: 2.8 10E3/UL (ref 0.8–5.3)
LYMPHOCYTES NFR BLD AUTO: 38 %
MCH RBC QN AUTO: 30.7 PG (ref 26.5–33)
MCHC RBC AUTO-ENTMCNC: 31.5 G/DL (ref 31.5–36.5)
MCV RBC AUTO: 97 FL (ref 78–100)
MONOCYTES # BLD AUTO: 0.5 10E3/UL (ref 0–1.3)
MONOCYTES NFR BLD AUTO: 6 %
NEUTROPHILS # BLD AUTO: 3.7 10E3/UL (ref 1.6–8.3)
NEUTROPHILS NFR BLD AUTO: 51 %
PLATELET # BLD AUTO: 198 10E3/UL (ref 150–450)
RBC # BLD AUTO: 3.81 10E6/UL (ref 3.8–5.2)
WBC # BLD AUTO: 7.3 10E3/UL (ref 4–11)

## 2022-01-21 PROCEDURE — 80307 DRUG TEST PRSMV CHEM ANLYZR: CPT | Mod: 90 | Performed by: PHYSICIAN ASSISTANT

## 2022-01-21 PROCEDURE — 84443 ASSAY THYROID STIM HORMONE: CPT | Performed by: PHYSICIAN ASSISTANT

## 2022-01-21 PROCEDURE — 36415 COLL VENOUS BLD VENIPUNCTURE: CPT | Performed by: PHYSICIAN ASSISTANT

## 2022-01-21 PROCEDURE — 80053 COMPREHEN METABOLIC PANEL: CPT | Performed by: PHYSICIAN ASSISTANT

## 2022-01-21 PROCEDURE — 83036 HEMOGLOBIN GLYCOSYLATED A1C: CPT | Performed by: PHYSICIAN ASSISTANT

## 2022-01-21 PROCEDURE — 99207 PR FOOT EXAM NO CHARGE: CPT | Mod: 25 | Performed by: PHYSICIAN ASSISTANT

## 2022-01-21 PROCEDURE — 86803 HEPATITIS C AB TEST: CPT | Performed by: PHYSICIAN ASSISTANT

## 2022-01-21 PROCEDURE — 82043 UR ALBUMIN QUANTITATIVE: CPT | Performed by: PHYSICIAN ASSISTANT

## 2022-01-21 PROCEDURE — 84439 ASSAY OF FREE THYROXINE: CPT | Performed by: PHYSICIAN ASSISTANT

## 2022-01-21 PROCEDURE — 99214 OFFICE O/P EST MOD 30 MIN: CPT | Mod: 25 | Performed by: PHYSICIAN ASSISTANT

## 2022-01-21 PROCEDURE — 99000 SPECIMEN HANDLING OFFICE-LAB: CPT | Performed by: PHYSICIAN ASSISTANT

## 2022-01-21 PROCEDURE — 85025 COMPLETE CBC W/AUTO DIFF WBC: CPT | Performed by: PHYSICIAN ASSISTANT

## 2022-01-21 PROCEDURE — G0439 PPPS, SUBSEQ VISIT: HCPCS | Performed by: PHYSICIAN ASSISTANT

## 2022-01-21 RX ORDER — OLANZAPINE 10 MG/1
TABLET, ORALLY DISINTEGRATING ORAL
COMMUNITY
Start: 2022-01-19 | End: 2022-03-18

## 2022-01-21 ASSESSMENT — ENCOUNTER SYMPTOMS
DYSURIA: 0
FREQUENCY: 1
MYALGIAS: 0
FEVER: 0
HEARTBURN: 0
HEMATOCHEZIA: 0
SHORTNESS OF BREATH: 0
PALPITATIONS: 0
PARESTHESIAS: 0
COUGH: 0
BREAST MASS: 0
DIZZINESS: 0
NAUSEA: 0
SORE THROAT: 0
EYE PAIN: 0
ABDOMINAL PAIN: 0
CONSTIPATION: 1
ARTHRALGIAS: 0
NERVOUS/ANXIOUS: 1
HEADACHES: 0
DIARRHEA: 0
WEAKNESS: 0
HEMATURIA: 0
CHILLS: 0
JOINT SWELLING: 0

## 2022-01-21 ASSESSMENT — MIFFLIN-ST. JEOR: SCORE: 949.98

## 2022-01-21 ASSESSMENT — PAIN SCALES - GENERAL: PAINLEVEL: NO PAIN (0)

## 2022-01-21 ASSESSMENT — ACTIVITIES OF DAILY LIVING (ADL): CURRENT_FUNCTION: TRANSPORTATION REQUIRES ASSISTANCE

## 2022-01-21 NOTE — PROGRESS NOTES
"SUBJECTIVE:   Linnette Monsivais is a 71 year old female who presents for Preventive Visit.      Patient has been advised of split billing requirements and indicates understanding: Yes  Are you in the first 12 months of your Medicare coverage?  No    Healthy Habits:     In general, how would you rate your overall health?  Fair    Frequency of exercise:  None    Do you usually eat at least 4 servings of fruit and vegetables a day, include whole grains    & fiber and avoid regularly eating high fat or \"junk\" foods?  No    Taking medications regularly:  Yes    Medication side effects:  None    Ability to successfully perform activities of daily living:  Transportation requires assistance    Home Safety:  No safety concerns identified    Hearing Impairment:  No hearing concerns    In the past 6 months, have you been bothered by leaking of urine?  No    In general, how would you rate your overall mental or emotional health?  Fair      PHQ-2 Total Score: 0    Additional concerns today:  No    She has increased lantus dose but is not consistent with dosing. She says that she usually takes 30 units nightly but sometimes takes less if her sugars are lower. She has never had any blood sugars <100. Checking blood sugars twice daily and usually around 140s fasting. She has not had diabetic eye exam.     She does report numbness/tingling in fingertips of both hands and toes on both feet. She's not sure how long this has been going on for but it is not new. She has never been evaluated for this before. No other numbness/tingling. No weakness.    Her previous care was in [place] and she believes she is up to date with preventive care and vaccines. Thinks she had mammogram in July 2021 that was normal and colonoscopy in March 2021 that showed polyps and they recommended repeating in 2 years. DANNIE completed today.    History of paranoid schizophrenia. Her mental health is much improved. She no longer feels scared and paranoid. She " is working with psychiatrist.    Do you feel safe in your environment? YES    Have you ever done Advance Care Planning? (For example, a Health Directive, POLST, or a discussion with a medical provider or your loved ones about your wishes): No, advance care planning information given to patient to review.  Advanced care planning was discussed at today's visit.    Fall risk  Fallen 2 or more times in the past year?: No  Any fall with injury in the past year?: No    Cognitive Screening   1) Repeat 3 items (Leader, Season, Table)    2) Clock draw: NORMAL  3) 3 item recall: Recalls 3 objects  Results: 3 items recalled: COGNITIVE IMPAIRMENT LESS LIKELY    Mini-CogTM Copyright S [employer]. Licensed by the author for use in [facility] [employer]; reprinted with permission ([email or web address]). All rights reserved.      Do you have sleep apnea, excessive snoring or daytime drowsiness?: yes    Reviewed and updated as needed this visit by clinical staff  Tobacco  Allergies  Meds  Problems  Med Hx  Surg Hx  Fam Hx  Soc Hx         Reviewed and updated as needed this visit by Provider  Tobacco  Allergies  Meds  Problems  Med Hx  Surg Hx  Fam Hx        Social History     Tobacco Use     Smoking status: Never Smoker     Smokeless tobacco: Never Used   Substance Use Topics     Alcohol use: Not Currently     If you drink alcohol do you typically have >3 drinks per day or >7 drinks per week? No    Alcohol Use 1/21/2022   Prescreen: >3 drinks/day or >7 drinks/week? No   Prescreen: >3 drinks/day or >7 drinks/week? -       Current providers sharing in care for this patient include:   Patient Care Team:  Myesha Crenshaw PA-C as PCP - General (Family Medicine)  Myesha Crenshaw PA-C as Assigned PCP    The following health maintenance items are reviewed in Epic and correct as of today:  Health Maintenance Due   Topic Date Due     DEXA  Never done     MICROALBUMIN  Never done     EYE EXAM  Never done     MAMMO SCREENING  Never done      COVID-19 Vaccine (1) Never done     COLORECTAL CANCER SCREENING  Never done     HEPATITIS C SCREENING  Never done     DTAP/TDAP/TD IMMUNIZATION (1 - Tdap) Never done     ZOSTER IMMUNIZATION (1 of 2) Never done     FALL RISK ASSESSMENT  Never done     Pneumococcal Vaccine: 65+ Years (1 of 1 - PPSV23) Never done     INFLUENZA VACCINE (1) Never done     Lab work is in process  Labs reviewed in EPIC  BP Readings from Last 3 Encounters:   01/21/22 138/66   12/06/21 120/60   12/02/21 133/63    Wt Readings from Last 3 Encounters:   01/21/22 57.7 kg (127 lb 3.2 oz)   12/06/21 54.8 kg (120 lb 14.4 oz)   11/27/21 54.7 kg (120 lb 11.2 oz)                  Patient Active Problem List   Diagnosis     Psychosis (H)     Paranoid schizophrenia, chronic condition with acute exacerbation (H)     Type 2 diabetes mellitus without complication, with long-term current use of insulin (H)     Acquired hypothyroidism     Hyperlipidemia LDL goal <130     Personal history of noncompliance with medical treatment, presenting hazards to health     History reviewed. No pertinent surgical history.    Social History     Tobacco Use     Smoking status: Never Smoker     Smokeless tobacco: Never Used   Substance Use Topics     Alcohol use: Not Currently     History reviewed. No pertinent family history.      Current Outpatient Medications   Medication Sig Dispense Refill     amLODIPine (NORVASC) 5 MG tablet Take 1 tablet (5 mg) by mouth daily 90 tablet 3     aspirin 81 MG EC tablet Take 1 tablet (81 mg) by mouth daily 90 tablet 3     blood glucose (NO BRAND SPECIFIED) lancets standard Use to test blood sugar  2 times daily as directed. To accompany glucose monitor brands per insurance coverage. 100 each 11     blood glucose (NO BRAND SPECIFIED) lancets standard Use to test blood sugar two times daily. Dispense per insurance coverage. 100 each 0     blood glucose (NO BRAND SPECIFIED) test strip Use to test blood sugar 2 times daily 100 strip 11      blood glucose (NO BRAND SPECIFIED) test strip Use to test blood sugar two times daily. Dispense per insurance coverage. 100 strip 0     blood glucose monitoring (NO BRAND SPECIFIED) meter device kit Check blood glucose 2 times daily. 1 kit 0     blood glucose monitoring (NO BRAND SPECIFIED) meter device kit Check blood sugar twice daily. Dispense Per insurance coverage 1 kit 0     calcium-vitamin D (OSCAL) 250-125 MG-UNIT TABS per tablet Take 1 tablet by mouth 2 times daily        glucose (BD GLUCOSE) 4 g chewable tablet Take 4 tablets by mouth every 15 minutes as needed for low blood sugar 50 tablet 0     insulin glargine (LANTUS PEN) 100 UNIT/ML pen Inject 30 Units Subcutaneous At Bedtime 30 mL 0     insulin pen needle (32G X 4 MM) 32G X 4 MM miscellaneous Single Use. 100 each 0     levothyroxine (SYNTHROID/LEVOTHROID) 25 MCG tablet Take 1 tablet (25 mcg) by mouth every morning (before breakfast) 30 tablet 1     OLANZapine zydis (ZYPREXA) 10 MG ODT        rosuvastatin (CRESTOR) 5 MG tablet Take 1 tablet (5 mg) by mouth daily 90 tablet 3     sitagliptin (JANUVIA) 100 MG tablet Take 1 tablet (100 mg) by mouth daily 90 tablet 1     No Known Allergies  Recent Labs   Lab Test 01/21/22  1142 11/10/21  0954   A1C 7.7* 9.3*   LDL  --  80   HDL  --  78   TRIG  --  82   ALT  --  20   CR  --  0.74   GFRESTIMATED  --  82   POTASSIUM  --  3.9   TSH  --  69.88*      Pneumonia Vaccine: she believes she had this in [place]. Will review records when available.  Mammogram Screening: she believes she had this June 2021 and was normal. Will review records when available.      Review of Systems   Constitutional: Negative for chills and fever.   HENT: Negative for congestion, ear pain, hearing loss and sore throat.    Eyes: Negative for pain and visual disturbance.   Respiratory: Negative for cough and shortness of breath.    Cardiovascular: Negative for chest pain, palpitations and peripheral edema.   Gastrointestinal: Positive for  "constipation. Negative for abdominal pain, diarrhea, heartburn, hematochezia and nausea.   Breasts:  Negative for tenderness, breast mass and discharge.   Genitourinary: Positive for frequency, urgency and vaginal discharge. Negative for dysuria, genital sores, hematuria, pelvic pain and vaginal bleeding.   Musculoskeletal: Negative for arthralgias, joint swelling and myalgias.   Skin: Negative for rash.   Neurological: Negative for dizziness, weakness, headaches and paresthesias.   Psychiatric/Behavioral: Positive for mood changes. The patient is nervous/anxious.        OBJECTIVE:   /66 (BP Location: Right arm, Patient Position: Sitting, Cuff Size: Adult Large)   Pulse 71   Temp 98.1  F (36.7  C) (Oral)   Resp 16   Ht 1.422 m (4' 8\")   Wt 57.7 kg (127 lb 3.2 oz)   SpO2 99%   BMI 28.52 kg/m   Estimated body mass index is 28.52 kg/m  as calculated from the following:    Height as of this encounter: 1.422 m (4' 8\").    Weight as of this encounter: 57.7 kg (127 lb 3.2 oz).  Physical Exam  GENERAL: healthy, alert and no distress  EYES: Eyes grossly normal to inspection, PERRL and conjunctivae and sclerae normal  HENT: ear canals and TM's normal, nose and mouth without ulcers or lesions  NECK: no adenopathy, no asymmetry, masses, or scars and thyroid normal to palpation  RESP: lungs clear to auscultation - no rales, rhonchi or wheezes  CV: regular rate and rhythm, normal S1 S2, no S3 or S4, no murmur, click or rub, no peripheral edema and peripheral pulses strong  ABDOMEN: soft, nontender, no hepatosplenomegaly, no masses and bowel sounds normal  MS: no gross musculoskeletal defects noted, no edema  SKIN: no suspicious lesions or rashes  NEURO: Normal strength and tone, mentation intact and speech normal  PSYCH: mentation appears normal, affect normal/bright  Diabetic foot exam: normal DP and PT pulses, no trophic changes or ulcerative lesions, normal sensory exam and normal monofilament exam    Diagnostic " Test Results:  Labs reviewed in Epic  Results for orders placed or performed in visit on 01/21/22 (from the past 24 hour(s))   Hemoglobin A1c   Result Value Ref Range    Hemoglobin A1C 7.7 (H) 0.0 - 5.6 %   CBC with Platelets & Differential    Narrative    The following orders were created for panel order CBC with Platelets & Differential.  Procedure                               Abnormality         Status                     ---------                               -----------         ------                     CBC with platelets and d...[[id number]]                      Final result                 Please view results for these tests on the individual orders.   CBC with platelets and differential   Result Value Ref Range    WBC Count 7.3 4.0 - 11.0 10e3/uL    RBC Count 3.81 3.80 - 5.20 10e6/uL    Hemoglobin 11.7 11.7 - 15.7 g/dL    Hematocrit 37.1 35.0 - 47.0 %    MCV 97 78 - 100 fL    MCH 30.7 26.5 - 33.0 pg    MCHC 31.5 31.5 - 36.5 g/dL    RDW 12.7 10.0 - 15.0 %    Platelet Count 198 150 - 450 10e3/uL    % Neutrophils 51 %    % Lymphocytes 38 %    % Monocytes 6 %    % Eosinophils 4 %    % Basophils 1 %    Absolute Neutrophils 3.7 1.6 - 8.3 10e3/uL    Absolute Lymphocytes 2.8 0.8 - 5.3 10e3/uL    Absolute Monocytes 0.5 0.0 - 1.3 10e3/uL    Absolute Eosinophils 0.3 0.0 - 0.7 10e3/uL    Absolute Basophils 0.1 0.0 - 0.2 10e3/uL       ASSESSMENT / PLAN:   Encounter for Medicare annual wellness exam  Reviewed personal history. Family history - will update when we receive past records. Reviewed age appropriate screenings. Reviewed healthy BP and BMI ranges. Counseled on lifestyle modifications for optimal mental and physical health.  Discussed age-appropriate health maintanence. Recommended any needed vaccinations. Continue to focus on well balanced diet and exercise. She believes she is up to date with vaccines from [place]. Will review records when available. DANNIE completed. Does not want COVID or flu shots today but  scheduled them for next week.    Visit for screening mammogram  Believes she is up to date with this, will review records when available. DANNIE completed.    Screen for colon cancer  Reports colonoscopy 3/2021 showing polyps and recommended follow-up in 2 years. Will review records when available. DANNIE completed.    Need for hepatitis C screening test  Discussed, agrees  - Hepatitis C Screen Reflex to HCV RNA Quant and Genotype; Future  - Hepatitis C Screen Reflex to HCV RNA Quant and Genotype    Post-menopausal  - DEXA HIP/PELVIS/SPINE - Future; Future    Type 2 diabetes mellitus without complication, with long-term current use of insulin (H)  Chronic, improved from 2 months ago. Not consistent with how much insulin she has been taking but states she is usually taking 30 units nightly. I would like her to continue with 30 units nightly and continue to monitor blood sugars twice daily. Goal fasting blood sugar: between . Let us know if any low blood sugars <70. Follow-up in 3 months for diabetes check. Schedule eye exam.  - Albumin Random Urine Quantitative with Creat Ratio; Future  - OPTOMETRY REFERRAL; Future  - Hemoglobin A1c  - T4 free  - TSH  - Albumin Random Urine Quantitative with Creat Ratio  - insulin glargine (LANTUS PEN) 100 UNIT/ML pen; Inject 30 Units Subcutaneous At Bedtime  - FOOT EXAM    Paranoid schizophrenia, subchronic condition (H)  Chronic, improving. Following with psychiatry.  - T4 free  - TSH  - Drug Abuse Scr  Ur w Qnt Reflx  - CBC with Platelets & Differential  - Comprehensive metabolic panel    Hyperlipidemia LDL goal <130  Well controlled.    Primary hypertension  Adequately controlled. Asymptomatic.  - Albumin Random Urine Quantitative with Creat Ratio; Future  - Albumin Random Urine Quantitative with Creat Ratio    Acquired hypothyroidism  Clinically euthyroid.   - T4 free  - TSH    Personal history of noncompliance with medical treatment, presenting hazards to health  -  "Hemoglobin A1c    Numbness and tingling  In fingertips and toes bilaterally. Recommend follow-up with neurology.  - Adult Neurology  Referral; Future      Patient has been advised of split billing requirements and indicates understanding: Yes    COUNSELING:  Reviewed preventive health counseling, as reflected in patient instructions    Estimated body mass index is 28.52 kg/m  as calculated from the following:    Height as of this encounter: 1.422 m (4' 8\").    Weight as of this encounter: 57.7 kg (127 lb 3.2 oz).    Weight management plan: Discussed healthy diet and exercise guidelines    She reports that she has never smoked. She has never used smokeless tobacco.      Appropriate preventive services were discussed with this patient, including applicable screening as appropriate for cardiovascular disease, diabetes, osteopenia/osteoporosis, and glaucoma.  As appropriate for age/gender, discussed screening for colorectal cancer, prostate cancer, breast cancer, and cervical cancer. Checklist reviewing preventive services available has been given to the patient.    Reviewed patients plan of care and provided an AVS. The Basic Care Plan (routine screening as documented in Health Maintenance) for Linnette meets the Care Plan requirement. This Care Plan has been established and reviewed with the Patient.    Counseling Resources:  ATP IV Guidelines  Pooled Cohorts Equation Calculator  Breast Cancer Risk Calculator  Breast Cancer: Medication to Reduce Risk  FRAX Risk Assessment  ICSI Preventive Guidelines  Dietary Guidelines for Americans, 2010  USDA's MyPlate  ASA Prophylaxis  Lung CA Screening    DANAE Ornelas [facility]    Identified Health Risks:  "

## 2022-01-21 NOTE — PATIENT INSTRUCTIONS
We will stay at 30 units lantus once per day. Continue to monitor your blood sugars twice daily.   Goal fasting blood sugar: between   Let us know if any low blood sugars <70      Patient Education   Personalized Prevention Plan  You are due for the preventive services outlined below.  Your care team is available to assist you in scheduling these services.  If you have already completed any of these items, please share that information with your care team to update in your medical record.  Health Maintenance Due   Topic Date Due     Osteoporosis Screening  Never done     Kidney Microalbumin Urine Test  Never done     Diabetic Foot Exam  Never done     ANNUAL REVIEW OF HM ORDERS  Never done     Discuss Advance Care Planning  Never done     Eye Exam  Never done     Mammogram  Never done     COVID-19 Vaccine (1) Never done     Colorectal Cancer Screening  Never done     Hepatitis C Screening  Never done     Diptheria Tetanus Pertussis (DTAP/TDAP/TD) Vaccine (1 - Tdap) Never done     Zoster (Shingles) Vaccine (1 of 2) Never done     Annual Wellness Visit  Never done     FALL RISK ASSESSMENT  Never done     Pneumococcal Vaccine (1 of 1 - PPSV23) Never done     Flu Vaccine (1) Never done

## 2022-01-22 LAB
ALBUMIN SERPL-MCNC: 3.6 G/DL (ref 3.4–5)
ALP SERPL-CCNC: 97 U/L (ref 40–150)
ALT SERPL W P-5'-P-CCNC: 37 U/L (ref 0–50)
ANION GAP SERPL CALCULATED.3IONS-SCNC: 7 MMOL/L (ref 3–14)
AST SERPL W P-5'-P-CCNC: 18 U/L (ref 0–45)
BILIRUB SERPL-MCNC: 0.6 MG/DL (ref 0.2–1.3)
BUN SERPL-MCNC: 25 MG/DL (ref 7–30)
CALCIUM SERPL-MCNC: 8.7 MG/DL (ref 8.5–10.1)
CHLORIDE BLD-SCNC: 109 MMOL/L (ref 94–109)
CO2 SERPL-SCNC: 25 MMOL/L (ref 20–32)
CREAT SERPL-MCNC: 0.62 MG/DL (ref 0.52–1.04)
CREAT UR-MCNC: 48 MG/DL
GFR SERPL CREATININE-BSD FRML MDRD: >90 ML/MIN/1.73M2
GLUCOSE BLD-MCNC: 81 MG/DL (ref 70–99)
MICROALBUMIN UR-MCNC: 25 MG/L
MICROALBUMIN/CREAT UR: 52.08 MG/G CR (ref 0–25)
POTASSIUM BLD-SCNC: 3.6 MMOL/L (ref 3.4–5.3)
PROT SERPL-MCNC: 7.8 G/DL (ref 6.8–8.8)
SODIUM SERPL-SCNC: 141 MMOL/L (ref 133–144)
T4 FREE SERPL-MCNC: 0.48 NG/DL (ref 0.76–1.46)
TSH SERPL DL<=0.005 MIU/L-ACNC: 69.89 MU/L (ref 0.4–4)

## 2022-01-24 DIAGNOSIS — E03.9 ACQUIRED HYPOTHYROIDISM: Primary | ICD-10-CM

## 2022-01-24 LAB — HCV AB SERPL QL IA: NONREACTIVE

## 2022-01-24 RX ORDER — LEVOTHYROXINE SODIUM 50 UG/1
50 TABLET ORAL DAILY
Qty: 90 TABLET | Refills: 0 | Status: SHIPPED | OUTPATIENT
Start: 2022-01-24 | End: 2022-03-11

## 2022-01-25 ENCOUNTER — OFFICE VISIT (OUTPATIENT)
Dept: FAMILY MEDICINE | Facility: CLINIC | Age: 72
End: 2022-01-25
Payer: COMMERCIAL

## 2022-01-25 VITALS
HEART RATE: 69 BPM | SYSTOLIC BLOOD PRESSURE: 130 MMHG | BODY MASS INDEX: 28.02 KG/M2 | OXYGEN SATURATION: 99 % | DIASTOLIC BLOOD PRESSURE: 72 MMHG | WEIGHT: 125 LBS | TEMPERATURE: 98.1 F

## 2022-01-25 DIAGNOSIS — E03.9 ACQUIRED HYPOTHYROIDISM: Primary | ICD-10-CM

## 2022-01-25 PROCEDURE — 99213 OFFICE O/P EST LOW 20 MIN: CPT | Performed by: PHYSICIAN ASSISTANT

## 2022-01-25 ASSESSMENT — PAIN SCALES - GENERAL: PAINLEVEL: NO PAIN (0)

## 2022-01-25 NOTE — PROGRESS NOTES
Assessment & Plan     Acquired hypothyroidism  Discussed that thyroid labs show that she is on too low of a dose of levothyroxine. Plan to increase dose to 50 mcg daily and recheck lab in 6 weeks. Risks and benefits of treatment plan discussed. Patient and/or parent acknowledges and agrees with plan of care, all questions answered.    Return in about 6 weeks (around 3/8/2022) for Lab Work.    DANAE Ornelas [facility] JACOB Anglin is a 71 year old who presents for the following health issues  accompanied by her brother in law.    HPI     PATIENT IS PRESENT TODAY TO DISCUSS BLOOD WORK FROM [facility]     Recent labs showed high TSH and low T4. They have some questions about what these lab results mean. She is taking 25 mcg levothyroxine daily for hypothyroidism. She does feel fatigued, cold. She thinks she was taking a higher dose of levothyroxine when she was in [place] but we do not have those records.     Review of Systems   Constitutional, HEENT, cardiovascular, pulmonary, gi and gu systems are negative, except as otherwise noted.      Objective    /72 (BP Location: Right arm, Patient Position: Sitting, Cuff Size: Adult Regular)   Pulse 69   Temp 98.1  F (36.7  C) (Oral)   Wt 56.7 kg (125 lb)   SpO2 99%   BMI 28.02 kg/m    Body mass index is 28.02 kg/m .  Physical Exam   GENERAL: healthy, alert and no distress  PSYCH: mentation appears normal, affect normal/bright    Office Visit on 01/21/2022   Component Date Value Ref Range Status     Hemoglobin A1C 01/21/2022 7.7* 0.0 - 5.6 % Final    Normal <5.7%   Prediabetes 5.7-6.4%    Diabetes 6.5% or higher     Note: Adopted from ADA consensus guidelines.     Free T4 01/21/2022 0.48* 0.76 - 1.46 ng/dL Final     TSH 01/21/2022 69.89* 0.40 - 4.00 mU/L Final     Sodium 01/21/2022 141  133 - 144 mmol/L Final     Potassium 01/21/2022 3.6  3.4 - 5.3 mmol/L Final     Chloride 01/21/2022 109  94 - 109 mmol/L Final      Carbon Dioxide (CO2) 01/21/2022 25  20 - 32 mmol/L Final     Anion Gap 01/21/2022 7  3 - 14 mmol/L Final     Urea Nitrogen 01/21/2022 25  7 - 30 mg/dL Final     Creatinine 01/21/2022 0.62  0.52 - 1.04 mg/dL Final     Calcium 01/21/2022 8.7  8.5 - 10.1 mg/dL Final     Glucose 01/21/2022 81  70 - 99 mg/dL Final     Alkaline Phosphatase 01/21/2022 97  40 - 150 U/L Final     AST 01/21/2022 18  0 - 45 U/L Final     ALT 01/21/2022 37  0 - 50 U/L Final     Protein Total 01/21/2022 7.8  6.8 - 8.8 g/dL Final     Albumin 01/21/2022 3.6  3.4 - 5.0 g/dL Final     Bilirubin Total 01/21/2022 0.6  0.2 - 1.3 mg/dL Final     GFR Estimate 01/21/2022 >90  >60 mL/min/1.73m2 Final    Effective December 21, 2021 eGFRcr in adults is calculated using the 2021 CKD-EPI creatinine equation which includes age and gender (Tank et al., NE, DOI: 10.1056/FLIGxm3743396)     Creatinine Urine mg/dL 01/21/2022 48  mg/dL Final     Albumin Urine mg/L 01/21/2022 25  mg/L Final     Albumin Urine mg/g Cr 01/21/2022 52.08* 0.00 - 25.00 mg/g Cr Final     Hepatitis C Antibody 01/21/2022 Nonreactive  Nonreactive Final     WBC Count 01/21/2022 7.3  4.0 - 11.0 10e3/uL Final     RBC Count 01/21/2022 3.81  3.80 - 5.20 10e6/uL Final     Hemoglobin 01/21/2022 11.7  11.7 - 15.7 g/dL Final     Hematocrit 01/21/2022 37.1  35.0 - 47.0 % Final     MCV 01/21/2022 97  78 - 100 fL Final     MCH 01/21/2022 30.7  26.5 - 33.0 pg Final     MCHC 01/21/2022 31.5  31.5 - 36.5 g/dL Final     RDW 01/21/2022 12.7  10.0 - 15.0 % Final     Platelet Count 01/21/2022 198  150 - 450 10e3/uL Final     % Neutrophils 01/21/2022 51  % Final     % Lymphocytes 01/21/2022 38  % Final     % Monocytes 01/21/2022 6  % Final     % Eosinophils 01/21/2022 4  % Final     % Basophils 01/21/2022 1  % Final     Absolute Neutrophils 01/21/2022 3.7  1.6 - 8.3 10e3/uL Final     Absolute Lymphocytes 01/21/2022 2.8  0.8 - 5.3 10e3/uL Final     Absolute Monocytes 01/21/2022 0.5  0.0 - 1.3 10e3/uL Final      Absolute Eosinophils 01/21/2022 0.3  0.0 - 0.7 10e3/uL Final     Absolute Basophils 01/21/2022 0.1  0.0 - 0.2 10e3/uL Final

## 2022-01-25 NOTE — PATIENT INSTRUCTIONS
Thyroid labs show that you are on too low of a dose of levothyroxine. We should increase your dose and recheck your labs in 6 weeks. I sent in a new prescription for 50 mcg levothyroxine and I placed a lab order to recheck your thyroid lab in 6 weeks.

## 2022-01-26 LAB
AMPHETAMINES UR QL SCN: NEGATIVE NG/ML
BARBITURATES UR QL SCN: NEGATIVE NG/ML
BENZODIAZ UR QL SCN: NEGATIVE NG/ML
CANNABINOIDS CTO UR CFM-MCNC: NEGATIVE NG/ML
COCAINE UR QL SCN: NEGATIVE NG/ML
COTININE UR QL SCN: NEGATIVE NG/ML
CREAT UR-MCNC: 49.6 MG/DL
ETHYL GLUCURONIDE UR QL SCN: NEGATIVE NG/ML
METHADONE UR QL SCN: NEGATIVE NG/ML
OXYCODONE+OXYMORPHONE UR QL SCN: NEGATIVE NG/ML
PCP UR QL SCN: NEGATIVE NG/ML
PH UR: 6 [PH]

## 2022-01-27 ENCOUNTER — ALLIED HEALTH/NURSE VISIT (OUTPATIENT)
Dept: NURSING | Facility: CLINIC | Age: 72
End: 2022-01-27
Payer: COMMERCIAL

## 2022-01-27 DIAGNOSIS — Z23 HIGH PRIORITY FOR 2019-NCOV VACCINE: Primary | ICD-10-CM

## 2022-01-27 PROCEDURE — 0051A COVID-19,PF,PFIZER (12+ YRS): CPT

## 2022-01-27 PROCEDURE — 99207 PR NO CHARGE LOS: CPT

## 2022-01-27 PROCEDURE — 91305 COVID-19,PF,PFIZER (12+ YRS): CPT

## 2022-02-17 ENCOUNTER — ALLIED HEALTH/NURSE VISIT (OUTPATIENT)
Dept: NURSING | Facility: CLINIC | Age: 72
End: 2022-02-17
Payer: COMMERCIAL

## 2022-02-17 DIAGNOSIS — Z23 HIGH PRIORITY FOR 2019-NCOV VACCINE: Primary | ICD-10-CM

## 2022-02-17 PROCEDURE — 0052A COVID-19,PF,PFIZER (12+ YRS): CPT

## 2022-02-17 PROCEDURE — 99207 PR NO CHARGE LOS: CPT

## 2022-02-17 PROCEDURE — 91305 COVID-19,PF,PFIZER (12+ YRS): CPT

## 2022-03-09 ENCOUNTER — LAB (OUTPATIENT)
Dept: LAB | Facility: CLINIC | Age: 72
End: 2022-03-09
Payer: COMMERCIAL

## 2022-03-09 DIAGNOSIS — E03.9 ACQUIRED HYPOTHYROIDISM: ICD-10-CM

## 2022-03-09 LAB
T4 FREE SERPL-MCNC: 0.81 NG/DL (ref 0.76–1.46)
TSH SERPL DL<=0.005 MIU/L-ACNC: 21.51 MU/L (ref 0.4–4)

## 2022-03-09 PROCEDURE — 84439 ASSAY OF FREE THYROXINE: CPT

## 2022-03-09 PROCEDURE — 84443 ASSAY THYROID STIM HORMONE: CPT

## 2022-03-09 PROCEDURE — 36415 COLL VENOUS BLD VENIPUNCTURE: CPT

## 2022-03-11 DIAGNOSIS — E03.9 ACQUIRED HYPOTHYROIDISM: Primary | ICD-10-CM

## 2022-03-11 RX ORDER — LEVOTHYROXINE SODIUM 75 UG/1
75 TABLET ORAL DAILY
Qty: 90 TABLET | Refills: 0 | Status: SHIPPED | OUTPATIENT
Start: 2022-03-11 | End: 2022-04-27

## 2022-03-11 NOTE — RESULT ENCOUNTER NOTE
Informed patient of provider message. Patient expressed understanding of med change and follow up.    Linn Buenrostro RN

## 2022-03-16 DIAGNOSIS — F20.0 PARANOID SCHIZOPHRENIA, CHRONIC CONDITION (H): ICD-10-CM

## 2022-03-16 DIAGNOSIS — Z79.899 HIGH RISK MEDICATION USE: Primary | ICD-10-CM

## 2022-03-17 RX ORDER — OLANZAPINE 10 MG/1
TABLET, ORALLY DISINTEGRATING ORAL
Status: CANCELLED | OUTPATIENT
Start: 2022-03-17

## 2022-03-18 RX ORDER — OLANZAPINE 5 MG/1
1 TABLET, ORALLY DISINTEGRATING ORAL DAILY
COMMUNITY
Start: 2022-02-16

## 2022-03-18 NOTE — TELEPHONE ENCOUNTER
The faxed request is for OLANZapine 5 MG TBDP 5  SIG: Dissolve one tablet on the tongue once daily

## 2022-04-01 ENCOUNTER — TRANSFERRED RECORDS (OUTPATIENT)
Dept: HEALTH INFORMATION MANAGEMENT | Facility: CLINIC | Age: 72
End: 2022-04-01
Payer: COMMERCIAL

## 2022-04-01 LAB — RETINOPATHY: NORMAL

## 2022-04-26 ENCOUNTER — OFFICE VISIT (OUTPATIENT)
Dept: FAMILY MEDICINE | Facility: CLINIC | Age: 72
End: 2022-04-26
Payer: COMMERCIAL

## 2022-04-26 VITALS
HEART RATE: 73 BPM | BODY MASS INDEX: 30.08 KG/M2 | RESPIRATION RATE: 15 BRPM | DIASTOLIC BLOOD PRESSURE: 64 MMHG | SYSTOLIC BLOOD PRESSURE: 134 MMHG | TEMPERATURE: 98.2 F | HEIGHT: 56 IN | WEIGHT: 133.7 LBS | OXYGEN SATURATION: 96 %

## 2022-04-26 DIAGNOSIS — Z79.4 TYPE 2 DIABETES MELLITUS WITHOUT COMPLICATION, WITH LONG-TERM CURRENT USE OF INSULIN (H): Primary | ICD-10-CM

## 2022-04-26 DIAGNOSIS — E11.9 TYPE 2 DIABETES MELLITUS WITHOUT COMPLICATION, WITH LONG-TERM CURRENT USE OF INSULIN (H): Primary | ICD-10-CM

## 2022-04-26 DIAGNOSIS — F20.0 PARANOID SCHIZOPHRENIA, CHRONIC CONDITION (H): ICD-10-CM

## 2022-04-26 DIAGNOSIS — E03.9 ACQUIRED HYPOTHYROIDISM: ICD-10-CM

## 2022-04-26 DIAGNOSIS — Z79.899 HIGH RISK MEDICATION USE: ICD-10-CM

## 2022-04-26 LAB
BASOPHILS # BLD AUTO: 0 10E3/UL (ref 0–0.2)
BASOPHILS NFR BLD AUTO: 1 %
EOSINOPHIL # BLD AUTO: 0.2 10E3/UL (ref 0–0.7)
EOSINOPHIL NFR BLD AUTO: 3 %
ERYTHROCYTE [DISTWIDTH] IN BLOOD BY AUTOMATED COUNT: 12 % (ref 10–15)
HBA1C MFR BLD: 6.1 % (ref 0–5.6)
HCT VFR BLD AUTO: 36.8 % (ref 35–47)
HGB BLD-MCNC: 12 G/DL (ref 11.7–15.7)
LYMPHOCYTES # BLD AUTO: 2.5 10E3/UL (ref 0.8–5.3)
LYMPHOCYTES NFR BLD AUTO: 38 %
MCH RBC QN AUTO: 30.5 PG (ref 26.5–33)
MCHC RBC AUTO-ENTMCNC: 32.6 G/DL (ref 31.5–36.5)
MCV RBC AUTO: 93 FL (ref 78–100)
MONOCYTES # BLD AUTO: 0.5 10E3/UL (ref 0–1.3)
MONOCYTES NFR BLD AUTO: 8 %
NEUTROPHILS # BLD AUTO: 3.4 10E3/UL (ref 1.6–8.3)
NEUTROPHILS NFR BLD AUTO: 51 %
PLATELET # BLD AUTO: 230 10E3/UL (ref 150–450)
RBC # BLD AUTO: 3.94 10E6/UL (ref 3.8–5.2)
WBC # BLD AUTO: 6.5 10E3/UL (ref 4–11)

## 2022-04-26 PROCEDURE — 36415 COLL VENOUS BLD VENIPUNCTURE: CPT | Performed by: PHYSICIAN ASSISTANT

## 2022-04-26 PROCEDURE — 85025 COMPLETE CBC W/AUTO DIFF WBC: CPT | Performed by: PHYSICIAN ASSISTANT

## 2022-04-26 PROCEDURE — 99214 OFFICE O/P EST MOD 30 MIN: CPT | Performed by: PHYSICIAN ASSISTANT

## 2022-04-26 PROCEDURE — 83036 HEMOGLOBIN GLYCOSYLATED A1C: CPT | Performed by: PHYSICIAN ASSISTANT

## 2022-04-26 PROCEDURE — 84443 ASSAY THYROID STIM HORMONE: CPT | Performed by: PHYSICIAN ASSISTANT

## 2022-04-26 PROCEDURE — 80061 LIPID PANEL: CPT | Performed by: PHYSICIAN ASSISTANT

## 2022-04-26 RX ORDER — UBIQUINOL 100 MG
CAPSULE ORAL
COMMUNITY
Start: 2021-05-19

## 2022-04-26 RX ORDER — CEPHALEXIN 500 MG/1
CAPSULE ORAL
COMMUNITY
Start: 2021-08-21 | End: 2022-04-26

## 2022-04-26 RX ORDER — TRIAMCINOLONE ACETONIDE 0.25 MG/G
CREAM TOPICAL
COMMUNITY
Start: 2021-10-25 | End: 2022-10-03

## 2022-04-26 RX ORDER — LEVOTHYROXINE SODIUM 125 UG/1
TABLET ORAL
COMMUNITY
Start: 2021-10-05 | End: 2022-04-26

## 2022-04-26 RX ORDER — FLUTICASONE PROPIONATE 50 MCG
SPRAY, SUSPENSION (ML) NASAL
COMMUNITY
Start: 2021-06-09 | End: 2022-12-01

## 2022-04-26 RX ORDER — LANCETS 33 GAUGE
EACH MISCELLANEOUS
COMMUNITY
Start: 2022-02-17 | End: 2024-06-26

## 2022-04-26 RX ORDER — BETAMETHASONE DIPROPIONATE 0.5 MG/G
CREAM TOPICAL
COMMUNITY
Start: 2021-09-03 | End: 2022-04-26

## 2022-04-26 RX ORDER — CIPROFLOXACIN HYDROCHLORIDE 3.5 MG/ML
SOLUTION/ DROPS TOPICAL
COMMUNITY
Start: 2021-10-08 | End: 2022-04-26

## 2022-04-26 ASSESSMENT — PAIN SCALES - GENERAL: PAINLEVEL: NO PAIN (0)

## 2022-04-26 NOTE — PATIENT INSTRUCTIONS
Plan to decrease insulin dose to 27 units daily. Continue to monitor your blood sugars. If you are still having morning fasting blood sugars in 70s or 80s, decrease insulin further to 25 units daily.    If blood sugar is <70 and you have symptoms of hypoglycemia, you should take 4 glucose tablets. Retest blood sugar after 15 minutes. If the glucose remains ?70, repeat treatment may be necessary. This can be followed by long-acting carbohydrate (a meal or a snack) to prevent recurrent symptoms.     Consider meeting with medication management pharmacist to review medications and other potential options for diabetes treatment. You can do this after our next visit if you would prefer.

## 2022-04-26 NOTE — PROGRESS NOTES
Assessment & Plan     Type 2 diabetes mellitus without complication, with long-term current use of insulin (H)  Taking 30 units lantus daily and 100 mg januvia. Checking blood sugars twice daily (AM fasting and at night before bed). AM fasting blood sugars usually in the low 100s, but has had several readings in the 70s and 80s. A1c has decreased from 7.7 to 6.1 in the past 3 months. She feels well. Reports she tried metformin in the past but it was hard to swallow the pills. She is hesitant to change her medication regimen because she has been doing insulin for many years and feels comfortable with it. She is worried about possible side effects from other medications. I am concerned about possible low blood sugars. I would like for her to decrease insulin dose a little (drop to 27 units daily). Continue to monitor blood sugars. If still having morning fasting blood sugars in 70s or 80s, decrease insulin further to 25 units daily. I also think it would be helpful for her to meet with [facility] pharmacist to review other potential options for diabetes treatment. She would like to wait on this for now and will consider when she returns from her trip to [place].  - Hemoglobin A1c; Future  - Hemoglobin A1c  - insulin glargine (LANTUS PEN) 100 UNIT/ML pen; Inject 27 Units Subcutaneous At Bedtime  - sitagliptin (JANUVIA) 100 MG tablet; Take 1 tablet (100 mg) by mouth daily  - glucose (BD GLUCOSE) 4 g chewable tablet; Take 4 tablets by mouth every 15 minutes as needed for low blood sugar    Acquired hypothyroidism  Rechecking TSH. Will adjust dose if needed per results.  - TSH with free T4 reflex    High risk medication use  Paranoid schizophrenia, chronic condition (H)  Following with psych.  - Lipid panel reflex to direct LDL Fasting  - CBC with Platelets & Differential    Return in about 3 months (around 7/26/2022) for Diabetes check.    DANAE Ornelas [facility] JACOB Anglin is a  71 year old who presents for the following health issues     History of Present Illness       Diabetes:   She presents for follow up of diabetes.  She is checking home blood glucose two times daily. She checks blood glucose before and after meals.  Blood glucose is never over 200 and never under 70. She is aware of hypoglycemia symptoms including weakness. She is concerned about blood sugar frequently over 200 and low blood sugar, several less than 70 in the past few weeks. She is having weight gain. The patient has had a diabetic eye exam in the last 12 months. Eye exam performed on April. Location of last eye exam [place].        She eats 0-1 servings of fruits and vegetables daily.She exercises with enough effort to increase her heart rate 9 or less minutes per day.  She exercises with enough effort to increase her heart rate 3 or less days per week.   She is taking medications regularly.       Component      Latest Ref Rng & Units 1/21/2022 4/26/2022   Hemoglobin A1C      0.0 - 5.6 % 7.7 (H) 6.1 (H)     Taking 30 units lantus daily and 100 mg januvia. Checking blood sugars twice daily (AM fasting and at night before bed). AM fasting blood sugars usually in the low 100s, but has had several readings in the 70s and 80s. A1c has decreased from 7.7 to 6.1 in the past 3 months. She feels well. Reports she tried metformin in the past but it was hard to swallow the pills. She is hesitant to change her medication regimen because she has been doing insulin for many years and feels comfortable with it. She is worried about possible side effects from other medications.    No chest pain, shortness of breath, swelling in the extremities, palpitations, dizziness, headaches, blurred vision.     She will be going to visit family in [place] for most of May and June.     Review of Systems   Constitutional, HEENT, cardiovascular, pulmonary, gi and gu systems are negative, except as otherwise noted.      Objective    /64 (BP  "Location: Right arm, Patient Position: Sitting, Cuff Size: Adult Regular)   Pulse 73   Temp 98.2  F (36.8  C) (Oral)   Resp 15   Ht 1.422 m (4' 8\")   Wt 60.6 kg (133 lb 11.2 oz)   SpO2 96%   BMI 29.97 kg/m    Body mass index is 29.97 kg/m .  Physical Exam   GENERAL: healthy, alert and no distress  NECK: no adenopathy, no asymmetry, masses, or scars and thyroid normal to palpation  RESP: lungs clear to auscultation - no rales, rhonchi or wheezes  CV: regular rate and rhythm, normal S1 S2, no S3 or S4, no murmur, click or rub, no peripheral edema and peripheral pulses strong  NEURO: Normal strength and tone, mentation intact and speech normal  PSYCH: mentation appears normal, affect normal/bright    Results for orders placed or performed in visit on 04/26/22 (from the past 24 hour(s))   CBC with Platelets & Differential    Narrative    The following orders were created for panel order CBC with Platelets & Differential.  Procedure                               Abnormality         Status                     ---------                               -----------         ------                     CBC with platelets and d...[[id number]]                      Final result                 Please view results for these tests on the individual orders.   Hemoglobin A1c   Result Value Ref Range    Hemoglobin A1C 6.1 (H) 0.0 - 5.6 %   CBC with platelets and differential   Result Value Ref Range    WBC Count 6.5 4.0 - 11.0 10e3/uL    RBC Count 3.94 3.80 - 5.20 10e6/uL    Hemoglobin 12.0 11.7 - 15.7 g/dL    Hematocrit 36.8 35.0 - 47.0 %    MCV 93 78 - 100 fL    MCH 30.5 26.5 - 33.0 pg    MCHC 32.6 31.5 - 36.5 g/dL    RDW 12.0 10.0 - 15.0 %    Platelet Count 230 150 - 450 10e3/uL    % Neutrophils 51 %    % Lymphocytes 38 %    % Monocytes 8 %    % Eosinophils 3 %    % Basophils 1 %    Absolute Neutrophils 3.4 1.6 - 8.3 10e3/uL    Absolute Lymphocytes 2.5 0.8 - 5.3 10e3/uL    Absolute Monocytes 0.5 0.0 - 1.3 10e3/uL    Absolute " Eosinophils 0.2 0.0 - 0.7 10e3/uL    Absolute Basophils 0.0 0.0 - 0.2 10e3/uL

## 2022-04-27 DIAGNOSIS — E03.9 ACQUIRED HYPOTHYROIDISM: ICD-10-CM

## 2022-04-27 LAB
CHOLEST SERPL-MCNC: 124 MG/DL
FASTING STATUS PATIENT QL REPORTED: NO
HDLC SERPL-MCNC: 49 MG/DL
LDLC SERPL CALC-MCNC: 46 MG/DL
NONHDLC SERPL-MCNC: 75 MG/DL
TRIGL SERPL-MCNC: 144 MG/DL
TSH SERPL DL<=0.005 MIU/L-ACNC: 2.5 MU/L (ref 0.4–4)

## 2022-04-27 RX ORDER — LEVOTHYROXINE SODIUM 75 UG/1
75 TABLET ORAL DAILY
Qty: 90 TABLET | Refills: 3 | Status: SHIPPED | OUTPATIENT
Start: 2022-04-27 | End: 2023-04-06

## 2022-08-04 DIAGNOSIS — E11.9 TYPE 2 DIABETES MELLITUS WITHOUT COMPLICATION, WITH LONG-TERM CURRENT USE OF INSULIN (H): ICD-10-CM

## 2022-08-04 DIAGNOSIS — Z79.4 TYPE 2 DIABETES MELLITUS WITHOUT COMPLICATION, WITH LONG-TERM CURRENT USE OF INSULIN (H): ICD-10-CM

## 2022-08-08 RX ORDER — SITAGLIPTIN 100 MG/1
TABLET, FILM COATED ORAL
Qty: 90 TABLET | Refills: 0 | Status: SHIPPED | OUTPATIENT
Start: 2022-08-08 | End: 2022-09-19

## 2022-08-08 NOTE — TELEPHONE ENCOUNTER
Routing refill request to provider for review/approval because:  Shyanne given x1 and patient did not follow up, please advise  No show for AUG appt.     Linnette QUAN RN

## 2022-09-19 ENCOUNTER — OFFICE VISIT (OUTPATIENT)
Dept: FAMILY MEDICINE | Facility: CLINIC | Age: 72
End: 2022-09-19
Payer: COMMERCIAL

## 2022-09-19 VITALS
DIASTOLIC BLOOD PRESSURE: 60 MMHG | WEIGHT: 139.4 LBS | SYSTOLIC BLOOD PRESSURE: 126 MMHG | OXYGEN SATURATION: 95 % | HEART RATE: 74 BPM | RESPIRATION RATE: 16 BRPM | TEMPERATURE: 98.9 F | BODY MASS INDEX: 31.25 KG/M2

## 2022-09-19 DIAGNOSIS — E11.9 TYPE 2 DIABETES MELLITUS WITHOUT COMPLICATION, WITH LONG-TERM CURRENT USE OF INSULIN (H): ICD-10-CM

## 2022-09-19 DIAGNOSIS — E78.5 HYPERLIPIDEMIA LDL GOAL <130: ICD-10-CM

## 2022-09-19 DIAGNOSIS — E66.811 CLASS 1 OBESITY DUE TO EXCESS CALORIES WITH SERIOUS COMORBIDITY AND BODY MASS INDEX (BMI) OF 31.0 TO 31.9 IN ADULT: ICD-10-CM

## 2022-09-19 DIAGNOSIS — E66.09 CLASS 1 OBESITY DUE TO EXCESS CALORIES WITH SERIOUS COMORBIDITY AND BODY MASS INDEX (BMI) OF 31.0 TO 31.9 IN ADULT: ICD-10-CM

## 2022-09-19 DIAGNOSIS — Z79.4 TYPE 2 DIABETES MELLITUS WITHOUT COMPLICATION, WITH LONG-TERM CURRENT USE OF INSULIN (H): ICD-10-CM

## 2022-09-19 DIAGNOSIS — I10 PRIMARY HYPERTENSION: ICD-10-CM

## 2022-09-19 LAB
HBA1C MFR BLD: 8.6 % (ref 0–5.6)
HOLD SPECIMEN: NORMAL
HOLD SPECIMEN: NORMAL

## 2022-09-19 PROCEDURE — 99214 OFFICE O/P EST MOD 30 MIN: CPT | Performed by: PHYSICIAN ASSISTANT

## 2022-09-19 PROCEDURE — 83036 HEMOGLOBIN GLYCOSYLATED A1C: CPT | Performed by: PHYSICIAN ASSISTANT

## 2022-09-19 PROCEDURE — 36415 COLL VENOUS BLD VENIPUNCTURE: CPT | Performed by: PHYSICIAN ASSISTANT

## 2022-09-19 RX ORDER — ROSUVASTATIN CALCIUM 5 MG/1
5 TABLET, COATED ORAL DAILY
Qty: 90 TABLET | Refills: 1 | Status: SHIPPED | OUTPATIENT
Start: 2022-09-19 | End: 2023-05-16

## 2022-09-19 RX ORDER — AMLODIPINE BESYLATE 5 MG/1
5 TABLET ORAL DAILY
Qty: 90 TABLET | Refills: 3 | Status: SHIPPED | OUTPATIENT
Start: 2022-09-19 | End: 2022-11-30

## 2022-09-19 NOTE — PATIENT INSTRUCTIONS
Increase lantus to 33 units daily. Continue to monitor blood sugars. Recommend that you follow-up with medication management pharmacist (Shae Ortiz) for further management of diabetes.

## 2022-09-19 NOTE — PROGRESS NOTES
Assessment & Plan     Primary hypertension  Chronic, at goal. Continue present management.  - amLODIPine (NORVASC) 5 MG tablet; Take 1 tablet (5 mg) by mouth daily    Type 2 diabetes mellitus without complication, with long-term current use of insulin (H)  Taking 30 units lantus daily and 100 mg januvia. Checking blood sugars twice daily (AM fasting and at night before bed). A1c has increased from 6.1 to 8.6 in the past 4 months. We did decrease lantus to 27 units 4 months ago when her A1c had decreased to 6.1 but she increased back to 30 units a few weeks ago. She has not been eating as well and has not been exercising. We discussed options. She is hesitant to change her medication regimen because she has been doing insulin for many years and feels comfortable with it. She is worried about possible side effects from other medications. For now, plan to increase lantus to 33 units once daily and continue to monitor blood sugars. Recommend follow-up with MTM pharmacist for further management - she would likely benefit from CGM to help guide treatment. Visit with MTM scheduled in 2 weeks. Could consider diabetes educator to help with nutrition as well. I will plan to see her back in 3 months.  - blood glucose monitoring (NO BRAND SPECIFIED) meter device kit; Check blood glucose 2 times daily.  - Med Therapy Management Referral  - insulin glargine (LANTUS PEN) 100 UNIT/ML pen; Inject 33 Units Subcutaneous At Bedtime  - sitagliptin (JANUVIA) 100 MG tablet; Take 1 tablet (100 mg) by mouth daily    Hyperlipidemia LDL goal <130  Chronic, stable. Refills given.  - rosuvastatin (CRESTOR) 5 MG tablet; Take 1 tablet (5 mg) by mouth daily    Class 1 obesity due to excess calories with serious comorbidity and body mass index (BMI) of 31.0 to 31.9 in adult  Discussed healthy diet, exercise, weight loss.    Return in about 1 month (around 10/19/2022) for MTM pharmacist.    Myesha Crenshaw PA-C  [facility]  JACOB Anglin is a 71 year old, presenting for the following health issues:  Recheck Medication (Patient needs a new blood gluclose moniter)      History of Present Illness       Reason for visit:  Follow up    She eats 0-1 servings of fruits and vegetables daily.She consumes 2 sweetened beverage(s) daily.She exercises with enough effort to increase her heart rate 9 or less minutes per day.  She exercises with enough effort to increase her heart rate 3 or less days per week.   She is taking medications regularly.       Diabetes Follow-up    How often are you checking your blood sugar? Two times daily  What time of day are you checking your blood sugars (select all that apply)?  Before meals and At bedtime  Have you had any blood sugars above 200?  Yes highest has been 300 - 11 times  Have you had any blood sugars below 70?  No    What symptoms do you notice when your blood sugar is low?  None and Not applicable    What concerns do you have today about your diabetes? None and Blood sugar is often over 200     Do you have any of these symptoms? (Select all that apply)  No numbness or tingling in feet.  No redness, sores or blisters on feet.  No complaints of excessive thirst.  No reports of blurry vision.  No significant changes to weight.    AM fasting blood sugars 160-170  Evening blood sugars in 300s often  When she was last seen, A1c was 6.1 so we had decreased lantus from 30 units daily to 27 units daily. She increased back to 30 units daily about 2-3 weeks after she noticed her blood sugars were increasing. She has not had any low blood sugars.     Reports she has not been good with her diet. She eats a lot of rice, potatoes, carbohydrates. Does not eat many vegetables. She has gained some weight over the past few months. Not exercising. History of hypothyroidism but feels like thyroid has been stable.      BP Readings from Last 2 Encounters:   09/19/22 126/60   04/26/22 134/64      Hemoglobin A1C (%)   Date Value   09/19/2022 8.6 (H)   04/26/2022 6.1 (H)     LDL Cholesterol Calculated (mg/dL)   Date Value   04/26/2022 46   11/10/2021 80       Hypertension Follow-up      Do you check your blood pressure regularly outside of the clinic? Yes     Are you following a low salt diet? Yes    Are your blood pressures ever more than 140 on the top number (systolic) OR more   than 90 on the bottom number (diastolic), for example 140/90? No    Medication Followup of Amlodipine     Taking Medication as prescribed: yes    Side Effects:  None    Medication Helping Symptoms:  yes    Here for blood pressure check. No chest pain, shortness of breath, swelling in the extremities, palpitations, dizziness, headaches, blurred vision.     Review of Systems   Constitutional, HEENT, cardiovascular, pulmonary, gi and gu systems are negative, except as otherwise noted.      Objective    /60 (BP Location: Right arm, Patient Position: Sitting, Cuff Size: Adult Regular)   Pulse 74   Temp 98.9  F (37.2  C)   Resp 16   Wt 63.2 kg (139 lb 6.4 oz)   SpO2 95%   BMI 31.25 kg/m    Body mass index is 31.25 kg/m .  Physical Exam   GENERAL: healthy, alert and no distress  NECK: no adenopathy, no asymmetry, masses, or scars and thyroid normal to palpation  RESP: lungs clear to auscultation - no rales, rhonchi or wheezes  CV: regular rate and rhythm, normal S1 S2, no S3 or S4, no murmur, click or rub, no peripheral edema and peripheral pulses strong  NEURO: Normal strength and tone, mentation intact and speech normal  PSYCH: mentation appears normal, affect normal/bright    Results for orders placed or performed in visit on 09/19/22 (from the past 24 hour(s))   Extra Tube    Narrative    The following orders were created for panel order Extra Tube.  Procedure                               Abnormality         Status                     ---------                               -----------         ------                      Extra Serum Separator Tu...[[id number]]                      Final result               Extra Green Top (Lithium...[[id number]]                      Final result                 Please view results for these tests on the individual orders.   Extra Serum Separator Tube (SST)   Result Value Ref Range    Hold Specimen JIC    Hemoglobin A1c   Result Value Ref Range    Hemoglobin A1C 8.6 (H) 0.0 - 5.6 %    Narrative    Verified by repeat analysis DA     Extra Green Top (Lithium Heparin) Tube   Result Value Ref Range    Hold Specimen JIC

## 2022-09-20 DIAGNOSIS — Z79.4 TYPE 2 DIABETES MELLITUS WITHOUT COMPLICATION, WITH LONG-TERM CURRENT USE OF INSULIN (H): ICD-10-CM

## 2022-09-20 DIAGNOSIS — E11.9 TYPE 2 DIABETES MELLITUS WITHOUT COMPLICATION, WITH LONG-TERM CURRENT USE OF INSULIN (H): ICD-10-CM

## 2022-10-03 ENCOUNTER — OFFICE VISIT (OUTPATIENT)
Dept: PHARMACY | Facility: CLINIC | Age: 72
End: 2022-10-03
Payer: COMMERCIAL

## 2022-10-03 VITALS
BODY MASS INDEX: 31.16 KG/M2 | HEART RATE: 73 BPM | DIASTOLIC BLOOD PRESSURE: 75 MMHG | SYSTOLIC BLOOD PRESSURE: 135 MMHG | WEIGHT: 139 LBS

## 2022-10-03 DIAGNOSIS — Z79.4 TYPE 2 DIABETES MELLITUS WITHOUT COMPLICATION, WITH LONG-TERM CURRENT USE OF INSULIN (H): Primary | ICD-10-CM

## 2022-10-03 DIAGNOSIS — E03.9 ACQUIRED HYPOTHYROIDISM: ICD-10-CM

## 2022-10-03 DIAGNOSIS — J30.9 ALLERGIC RHINITIS: ICD-10-CM

## 2022-10-03 DIAGNOSIS — I10 BENIGN ESSENTIAL HYPERTENSION: ICD-10-CM

## 2022-10-03 DIAGNOSIS — E11.9 TYPE 2 DIABETES MELLITUS WITHOUT COMPLICATION, WITH LONG-TERM CURRENT USE OF INSULIN (H): Primary | ICD-10-CM

## 2022-10-03 DIAGNOSIS — M85.80 OSTEOPENIA: ICD-10-CM

## 2022-10-03 DIAGNOSIS — E78.5 HYPERLIPIDEMIA LDL GOAL <130: ICD-10-CM

## 2022-10-03 PROCEDURE — 99207 PR NO CHARGE LOS: CPT | Performed by: PHARMACIST

## 2022-10-03 RX ORDER — SEMAGLUTIDE 1.34 MG/ML
INJECTION, SOLUTION SUBCUTANEOUS
Qty: 1.5 ML | Refills: 1 | Status: SHIPPED | OUTPATIENT
Start: 2022-10-03 | End: 2022-10-31

## 2022-10-03 NOTE — Clinical Note
Thanks for the referral. She was willing to change Januvia to Ozempic!  Hopefully that helps. At next visit I am going to talk to her about either adding an ACE or ARB or switching amlodipine to ACE or ARB depending on her blood pressure. Do you have a preference? Also what are your thoughts on stopping ASA since she is over 70? I dont see history of  MI or stroke. Let me know what you think. Thanks!    Shae

## 2022-10-03 NOTE — PATIENT INSTRUCTIONS
Recommendations from today's MTM visit:                                                    MTM (medication therapy management) is a service provided by a clinical pharmacist designed to help you get the most of out of your medicines.   Today we reviewed what your medicines are for, how to know if they are working, that your medicines are safe and how to make your medicine regimen as easy as possible.      1. Schedule a diabetic eye exam. Make sure your insurance covers the clinic you want to go to.     2. You can try using flonase nasal spray if you have nasal allergy symptoms or you can get a Zaditor eye drop if you have eye allergy symptoms.     3. Stop Januvia    4. Start Ozempic 0.25 mg once WEEKLY for 4 weeks then increase to 0.5 mg once a WEEK.    5. I ordered a Diabetes education referral. Please schedule with a diabetes educator to discuss your diet further.     6. Keep checking blood sugars twice daily.    7. Start taking calcium/vit D one tablet twice daily to keep bones strong.    8. Watch for low blood sugars    What is hypoglycemia:  Hypoglycemia is when blood sugar levels become too low - below 70 m/dl.      What causes hypoglycemia?  - using too much insulin  -taking too many diabetes pills  -not eating enough, or skipping meals or snacks  -not eating enough carbohydrate with meals  -changing your exercise routine  -drinking alcohol in excess    It is also possible to have hypoglycemia even when you are carefully managing your blood sugar levels.    What does it feel like when blood sugars get too low?  You may feel:  - anxious  -confused  -dizzy  -hungry  -light-headed  -nervous  -shaky  -sleepy  -sweaty    You may have  -blurred or cloudy vision  -heart palpitations (heart skips a beat or races)  -tingling or numbness around the mouth and tongue  -tremors    What to do if you have symptoms of hypoglycememia:  If you think your blood sugar is too low, check it with a glucose meter.  If its below 70  "mg/dl, consume one of the following:  Fruit juice (1/2 cup)  Glucose tablets (15 grams)  Hard candy (5 to 7 pieces)  Honey or sugar (2 teaspoons)  Milk (1/2 cup)  Soft drink (non-diet, 1/2 cup)    Wait 15 minutes and check your blood glucose again.  IF it is still below 70 mg/dl, have another food item listed above. Wait another 15 minutes and repeat the blood glucose test.  Have a small meal or snack that contains some carbohydrate after your blood glucose rises above 70 mg/dl.    If you are at risk of hypoglycemia, always carry with you glucose tablets or one of the foods listed above.      To prevent Hypoglycemia:  Avoid situations that may cause hypoglycemia  Before making any change to your diet or exercise routine, discuss them with your healthcare provider  Keep a record of your blood glucose levels.  Include the time of day, diabetes medications, when you had your last meal or snack, and what you were doing at the time (e.g. Watching TV, gardening, jogging, etc).    Talk to your healthcare provider if your blood glucose levels are often low    Patient guide on hypoglycemia    http://www.[employer].org/Resources/upload/patient-guide-diagnosis-and-management-hypoglycemia-147152.pdf     Follow-up: Return in 4 weeks (on 10/31/2022) for MTM office visit with Shae Ortiz.    It was great speaking with you today.  I value your experience and would be very thankful for your time in providing feedback in our clinic survey. In the next few days, you may receive an email or text message from [employer] with a link to a survey related to your  clinical pharmacist.\"     To schedule another MTM appointment, please call the clinic directly or you may call the MTM scheduling line at [phone] or toll-free at [phone].     My Clinical Pharmacist's contact information:                                                      Please feel free to contact me with any questions or concerns you have.      Shae Ortiz, " PharmD  Medication Therapy Management Pharmacist  [facility] - Monday and Wednesday 7:30 - 4:00  Pager: [phone]

## 2022-10-03 NOTE — PROGRESS NOTES
Medication Therapy Management (MTM) Encounter    ASSESSMENT:                            Medication Adherence/Access: No issues identified    Type 2 Diabetes: Patient is not meeting A1c goal of < 7%. Self monitoring of blood glucose is not at goal of post prandial < 150 mg/dL. Patient would benefit from GLP-1 agonist (Ozempic) :  Start at dose : 0.25 mg once weekly.  In turn, will stop Januvia. Also ordered diabetes education referral today. Due for influenza vaccine, pneumococcal vaccine, Shingrix and COVID booster. Did not discuss today but will follow up on this at future visit. Microalbumin is not at goal < 30 mg/g. Not taking an ACE-inhibitor or ARB; is indicated. Will discuss at next visit. Depending on blood pressure could switch amlodipine to an ACE or ARB. Could consider stopping aspirin due to age and patient using for primary prevention. Will discuss at future visit.     Hyperlipidemia: Patient is on moderate intensity statin which is indicated based on 2019 ACC/AHA guidelines for lipid management.      Hypertension: Patient is meeting blood pressure goal of < 140/90mmHg.    Hypothyroidism: Stable. Last TSH is within normal limits.     Allergic Rhinitis: Recommended she try Flonase nasal spray for nasal symptoms and Zaditor eye drops for eye symptoms.    Osteopenia: Patient is not meeting RDI of calcium 1200mg/day. Patient is not meeting RDI of Vitamin D 1000 IU/day. Patient would benefit from:additional supplementation with calcium and vitamin D. Should consider checking vitamin D level to ensure she is getting adequate intake.     PLAN:                            1. Schedule a diabetic eye exam. Make sure your insurance covers the clinic you want to go to.     2. You can try using Flonase nasal spray if you have nasal allergy symptoms or you can get a Zaditor eye drop if you have eye allergy symptoms.     3. Stop Januvia    4. Start Ozempic 0.25 mg once WEEKLY for 4 weeks then increase to 0.5 mg once a  WEEK.    5. I ordered a Diabetes education referral. Please schedule with a diabetes educator to discuss your diet further.     6. Keep checking blood sugars twice daily.    7. Watch for low blood sugars    8. Start taking calcium/vit D one tablet twice daily to keep bones strong.    Follow-up: Return in 4 weeks (on 10/31/2022) for MTM office visit with Shae Ortiz.    SUBJECTIVE/OBJECTIVE:                          Linnette Monsivais is a 71 year old female coming in for an initial visit. She was referred to me from [facility]. Patient was accompanied by Sarbjit her brother in law.     Reason for visit: diabetes management.    Allergies/ADRs: Reviewed in chart  Past Medical History: Reviewed in chart  Tobacco: She reports that she has never smoked. She has never used smokeless tobacco.  Alcohol: not currently using  Personal Healthcare Goals: get blood sugar down    Specialty Providers  Psychiatrist: Dr. Valadez - seeing every 6 months with next visit in November    Medication Adherence/Access: Patient takes medications directly from bottles.  Patient takes medications 2 time(s) per day.   Per patient, misses medication 0 times per week.  Did not take while on vacation due to not having pills.   Medication barriers: none.   The patient fills medications at [place]: YES.    Type 2 Diabetes:  Currently taking Lantus 33 units once daily (increased at PCP visit on 22) and Januvia 100 mg once daily. Patient is not experiencing side effects.  Blood sugar monitorin time(s) daily. Ranges (from patient's glucose log):   Fasting- 100-150's  Post-Prandial (3 hr after dinner)- all over 200 and some in the 300's  Symptoms of low blood sugar? none  Symptoms of high blood sugar? polyuria and fatigue  Eye exam: due - unknown history of retinopathy  Foot exam: up to date  Diet/Exercise: Reports eating three meals a day. She only has fruit with her lunch.  Last week she started walking more and doing about an hour of  walking outside.  Aspirin: Taking 81mg daily and denies side effects   Statin: Yes: rosuvastatin   ACEi/ARB: No.   Urine Albumin:   Lab Results   Component Value Date    UMALCR 52.08 (H) 01/21/2022      Lab Results   Component Value Date    A1C 8.6 09/19/2022    A1C 6.1 04/26/2022    A1C 7.7 01/21/2022    A1C 9.3 11/10/2021     Last Comprehensive Metabolic Panel:  Sodium   Date Value Ref Range Status   01/21/2022 141 133 - 144 mmol/L Final     Potassium   Date Value Ref Range Status   01/21/2022 3.6 3.4 - 5.3 mmol/L Final     Chloride   Date Value Ref Range Status   01/21/2022 109 94 - 109 mmol/L Final     Carbon Dioxide (CO2)   Date Value Ref Range Status   01/21/2022 25 20 - 32 mmol/L Final     Anion Gap   Date Value Ref Range Status   01/21/2022 7 3 - 14 mmol/L Final     Glucose   Date Value Ref Range Status   01/21/2022 81 70 - 99 mg/dL Final     Urea Nitrogen   Date Value Ref Range Status   01/21/2022 25 7 - 30 mg/dL Final     Creatinine   Date Value Ref Range Status   01/21/2022 0.62 0.52 - 1.04 mg/dL Final     GFR Estimate   Date Value Ref Range Status   01/21/2022 >90 >60 mL/min/1.73m2 Final     Comment:     Effective December 21, 2021 eGFRcr in adults is calculated using the 2021 CKD-EPI creatinine equation which includes age and gender (Tank et al., NEJ, DOI: 10.1056/NQYTcg1494313)     Calcium   Date Value Ref Range Status   01/21/2022 8.7 8.5 - 10.1 mg/dL Final       Hyperlipidemia: Current therapy includes rosuvastatin 5mg daily.  Patient reports no significant myalgias or other side effects.    Recent Labs   Lab Test 04/26/22  1347 11/10/21  0954   CHOL 124 174   HDL 49* 78   LDL 46 80   TRIG 144 82     Lab Results   Component Value Date    AST 18 01/21/2022     Lab Results   Component Value Date    ALT 37 01/21/2022     Hypertension: Current medications include amlodipine 5 mg once daily in the afternoon around 3 pm.  Patient does not self-monitor blood pressure.  Patient reports no current  medication side effects.  BP Readings from Last 3 Encounters:   10/03/22 135/75   09/19/22 126/60   04/26/22 134/64     Hypothyroidism: Patient is taking levothyroxine 75 mcg daily. Patient is having the following symptoms: none.   TSH   Date Value Ref Range Status   04/26/2022 2.50 0.40 - 4.00 mU/L Final     Allergic Rhinitis: Current medications include none currently. She reports she has used Flonase nasal spray in the past but the doctor who prescribed it stopped prescribing.  Feels that she may have allergy symptoms both nasal and eye. Would be willing to try the Flonase nasal spray and eye drop if needed.    Osteopenia: Current therapy includes: calcium/vit D 250/125 mg once daily. Patient is not experiencing side effects.  DEXA History: 9/3/2019  Patient is getting the following sources of dietary calcium: yogurt and cheese a couple times a week  Risk factors: post-menopausal  Last vitamin D level:  No results found for: VITDT    Today's Vitals: /75   Pulse 73   Wt 139 lb (63 kg)   BMI 31.16 kg/m    ----------------    I spent 60 minutes with this patient today. All changes were made via collaborative practice agreement with Myesha Crenshaw PA-C. A copy of the visit note was provided to the patient's provider(s).    The patient was given a summary of these recommendations.     Shae Ortiz, PharmD  Medication Therapy Management Pharmacist     Medication Therapy Recommendations  Allergic rhinitis    Current Medication: fluticasone (FLONASE) 50 MCG/ACT nasal spray   Rationale: Patient forgets to take - Adherence - Adherence   Recommendation: Provide Education - fluticasone 50 MCG/ACT nasal spray   Status: Patient Agreed - Adherence/Education         Osteopenia    Current Medication: calcium-vitamin D (OSCAL) 250-125 MG-UNIT TABS per tablet (Discontinued)   Rationale: Dose too low - Dosage too low - Effectiveness   Recommendation: Increase Dose - Calcium 600+D 600-800 MG-UNIT Tabs   Status: Accepted  per CPA         Type 2 diabetes mellitus without complication, with long-term current use of insulin (H)    Current Medication: sitagliptin (JANUVIA) 100 MG tablet (Discontinued)   Rationale: More effective medication available - Ineffective medication - Effectiveness   Recommendation: Change Medication - Ozempic (0.25 or 0.5 MG/DOSE) 2 MG/1.5ML Sopn   Status: Accepted per CPA

## 2022-10-26 ENCOUNTER — ALLIED HEALTH/NURSE VISIT (OUTPATIENT)
Dept: EDUCATION SERVICES | Facility: CLINIC | Age: 72
End: 2022-10-26
Attending: PHYSICIAN ASSISTANT
Payer: COMMERCIAL

## 2022-10-26 DIAGNOSIS — Z79.4 TYPE 2 DIABETES MELLITUS WITHOUT COMPLICATION, WITH LONG-TERM CURRENT USE OF INSULIN (H): ICD-10-CM

## 2022-10-26 DIAGNOSIS — E11.9 TYPE 2 DIABETES MELLITUS WITHOUT COMPLICATION, WITH LONG-TERM CURRENT USE OF INSULIN (H): ICD-10-CM

## 2022-10-26 PROCEDURE — G0108 DIAB MANAGE TRN  PER INDIV: HCPCS | Mod: AE

## 2022-10-26 NOTE — LETTER
10/26/2022         RE: Linnette Monsivais  [address] [facility] [address]        Dear Colleague,    Thank you for referring your patient, Linnette Monsivais, to the [facility]. Please see a copy of my visit note below.    Diabetes Self-Management Education & Support    Presents for: Individual review    Type of Service: In Person Visit    Assessment Type:   ASSESSMENT:  Patient thought today's appointment was an eye exam and was not aware she was seeing diabetes education. She states she is working with [facility] on her diabetes, but since she is here she is willing to meet with me to discuss diabetes and her diet. She reports favoring carbohydrates at meals and snacks, she likes bread, rice and potatoes. She also walks daily with her sister, but if the weather isn't good she does not exercise.     Discussed pathophysiology of diabetes. Reviewed carbohydrate sources, portion control and the benefit of spreading intake throughout the day.  Encouraged 3 meals a day and small snacks between meals if hungry, limiting fruit to once serving at a time and balance meals. Reviewed benefits of exercise to help with better blood glucose utilization. discussed signs and symptoms of hypoglycemia and treatment.     Patient engaged in visit and feels she can make some diet changes and add some activity on the days the weather is not favorable for walking outside. she would like to continue diabetes management with [facility] and is not interested in follow up with diabetes education at this time.     Patient's most recent   Lab Results   Component Value Date    A1C 8.6 09/19/2022     is not meeting goal of <7.0    PLAN  Meal Plan Recommendation: eat 3 meals a day, have small snacks between meals, if needed, use portion control, use plate planning method, and refer to the snack ideas  Exercise / activity plan: continue with your daily walking. On bad weather days - try adding indoor activity such as  "chair exercises, using a smartphone mellissa, walking the stairs in your home or dancing.   Check blood sugars - continue testing your blood sugars twice a day  Recommend continue with your medications    Follow up:  Follow-up for annual diabetes education review in 1 year or sooner, if needed.  Follow up with MTM as scheduled.    See Care Plan for co-developed, patient-state behavior change goals.  AVS provided for patient today.    Education Materials Provided:  [employer] Understanding Diabetes Booklet, My Plate Planner and snack ideas      SUBJECTIVE/OBJECTIVE:  Presents for: Individual review  Accompanied by: Self, Other (lindsayer olyGus Sarbjit)  Diabetes education in the past 24mo: Yes  Focus of Visit: Patient Unsure  Diabetes type: Type 2  Disease course: Getting harder to manage  How confident are you filling out medical forms by yourself:: Quite a bit  Diabetes management related comments/concerns: thought today was an eye exam, working with MTM on diabetes management  Transportation concerns: No  Difficulty affording diabetes medication?: No  Difficulty affording diabetes testing supplies?: No  Other concerns:: None, English as a second language  Cultural Influences/Ethnic Background:   or     Diabetes Symptoms & Complications:  Fatigue: No  Neuropathy: No  Polydipsia: No  Polyphagia: Sometimes  Polyuria: Yes  Visual change: Sometimes  Slow healing wounds: No  Complications assessed today?: Yes  Autonomic neuropathy: No  CVA: No  Heart disease: Yes  Nephropathy: No  Peripheral neuropathy: Yes  Peripheral Vascular Disease: No  Retinopathy: Yes  Sexual dysfunction: No    Patient Problem List and Family Medical History reviewed for relevant medical history, current medical status, and diabetes risk factors.    Vitals:  There were no vitals taken for this visit.  Estimated body mass index is 31.16 kg/m  as calculated from the following:    Height as of 4/26/22: 1.422 m (4' 8\").    Weight as of " 10/3/22: 63 kg (139 lb).   Last 3 BP:   BP Readings from Last 3 Encounters:   10/03/22 135/75   09/19/22 126/60   04/26/22 134/64       History   Smoking Status     Never   Smokeless Tobacco     Never       Labs:  Lab Results   Component Value Date    A1C 8.6 09/19/2022     Lab Results   Component Value Date    GLC 81 01/21/2022     Lab Results   Component Value Date    LDL 46 04/26/2022     Direct Measure HDL   Date Value Ref Range Status   04/26/2022 49 (L) >=50 mg/dL Final   ]  GFR Estimate   Date Value Ref Range Status   01/21/2022 >90 >60 mL/min/1.73m2 Final     Comment:     Effective December 21, 2021 eGFRcr in adults is calculated using the 2021 CKD-EPI creatinine equation which includes age and gender (Tank et al., NE, DOI: 10.1056/EWZKlf5003312)     No results found for: GFRESTBLACK  Lab Results   Component Value Date    CR 0.62 01/21/2022     No results found for: MICROALBUMIN    Healthy Eating:  Healthy Eating Assessed Today: Yes  Cultural/Zoroastrian diet restrictions?: No  Meal planning/habits: Low salt  How many times a week on average do you eat food made away from home (restaurant/take-out)?: 1  Meals include: Breakfast, Lunch, Dinner, Evening Snack  Breakfast: bread ( size of bun) with avacado, coffee  Lunch: banana, tangerine  Dinner: tuna salad with potato and rice  Snacks: peanuts, hawaiian bread  Beverages: Water, Coffee, Diet soda  Has patient met with a dietitian in the past?: No    Being Active:  Being Active Assessed Today: Yes  Exercise:: Yes  Days per week of moderate to strenuous exercise (like a brisk walk): 7  On average, minutes per day of exercise at this level: 60  How intense was your typical exercise? : Light (like stretching or slow walking)  Exercise Minutes per Week: 420  Barrier to exercise: None    Monitoring:  Monitoring Assessed Today: Yes  Did patient bring glucose meter to appointment? : No (patient reports BG this morning 126 and yesterday 159)  Blood Glucose Meter:  Accu-chek  Times checking blood sugar at home (number): 2  Times checking blood sugar at home (per): Day  Blood glucose trend: Fluctuating    Taking Medications:  Diabetes Medication(s)     Diabetic Other       glucose (BD GLUCOSE) 4 g chewable tablet    Take 4 tablets by mouth every 15 minutes as needed for low blood sugar     Patient not taking: Reported on 10/3/2022    Insulin       insulin glargine (LANTUS PEN) 100 UNIT/ML pen    Inject 33 Units Subcutaneous At Bedtime    Incretin Mimetic Agents (GLP-1 Receptor Agonists)       semaglutide (OZEMPIC, 0.25 OR 0.5 MG/DOSE,) 2 MG/1.5ML SOPN pen    Inject 0.25 mg Subcutaneous once a week for 28 days, THEN 0.5 mg once a week for 28 days.          Taking Medication Assessed Today: Yes  Current Treatments: Insulin Injections, Non-insulin Injectables  Dose schedule: Pre-lunch, Other (Ozempic on Wednesday)  Given by: Patient  Injection/Infusion sites: Thighs  Problems taking diabetes medications regularly?: No  Diabetes medication side effects?: No    Problem Solving:  Problem Solving Assessed Today: Yes  Is the patient at risk for hypoglycemia?: Yes  Hypoglycemia Frequency: Rarely (once last week)  Hypoglycemia Treatment: Juice    Hypoglycemia symptoms  Confusion: No  Dizziness or Light-Headedness: Yes (and heart beating fast)  Headaches: No  Hunger: No  Mood changes: No  Nervousness/Anxiety: No  Sleepiness: No  Speech difficulty: No  Sweats: No  Feeling shaky: No    Hypoglycemia Complications  Blackouts: Yes  Hospitalization: No  Nocturnal hypoglycemia: No  Required assistance: No  Required glucagon injection: No  Seizures: No    Reducing Risks:  Reducing Risks Assessed Today: Yes  Diabetes Risks: Age over 45 years, Family History  CAD Risks: Diabetes Mellitus, Dyslipidemia, Sedentary lifestyle  Has dilated eye exam at least once a year?: No  Sees dentist every 6 months?: No  Feet checked by healthcare provider in the last year?: No    Healthy Coping:  Healthy Coping  Assessed Today: Yes  Emotional response to diabetes: Ready to learn  Informal Support system:: Family  Stage of change: PREPARATION (Decided to change - considering how)  Patient Activation Measure Survey Score:  No flowsheet data found.      Care Plan and Education Provided:  Care Plan: Diabetes   Updates made by Tracie Steele RN since 10/26/2022 12:00 AM      Problem: HbA1C Not In Goal       Goal: Establish Regular Follow-Ups with PCP       Task: Discuss with PCP the recommended timing for patient's next follow up visit(s)    Responsible User: Tracie Steele RN      Task: Discuss schedule for PCP visits with patient    Responsible User: Tracie Steele RN      Goal: Get HbA1C Level in Goal       Task: Educate patient on diabetes education self-management topics    Responsible User: Tracie Steele RN      Task: Educate patient on benefits of regular glucose monitoring    Responsible User: Tracie Steele RN      Task: Refer patient to appropriate extended care team member, as needed (Medication Therapy Management, Behavioral Health, Physical Therapy, etc.)    Responsible User: Tracie Steele RN      Task: Discuss diabetes treatment plan with patient    Responsible User: Tracie Steele RN      Problem: Diabetes Self-Management Education Needed to Optimize Self-Care Behaviors       Goal: Understand diabetes pathophysiology and disease progression       Task: Provide education on diabetes pathophysiology and disease progression specfic to patient's diabetes type Completed 10/26/2022   Responsible User: Tracie Steele RN      Goal: Healthy Eating - follow a healthy eating pattern for diabetes       Task: Provide education on portion control and consistency in amount, composition and timing of food intake Completed 10/26/2022   Responsible User: Tracie Steele RN      Task: Provide education on managing carbohydrate intake (carbohydrate counting, plate planning method, etc.) Completed  10/26/2022   Responsible User: Tracie Steele RN      Task: Provide education on weight management    Responsible User: Tracie Steele RN      Task: Provide education on heart healthy eating    Responsible User: Tracie Steele RN      Task: Provide education on eating out    Responsible User: Tracie Steele RN      Task: Develop individualized healthy eating plan with patient    Responsible User: Tracie Steele RN      Goal: Being Active - get regular physical activity, working up to at least 150 minutes per week       Task: Provide education on relationship of activity to glucose and precautions to take if at risk for low glucose Completed 10/26/2022   Responsible User: Tracie Steele RN      Task: Discuss barriers to physical activity with patient    Responsible User: Tracie Steele RN      Task: Develop physical activity plan with patient    Responsible User: Tracie Steele RN      Task: Explore community resources including walking groups, assistance programs, and home videos    Responsible User: Tracie Steele RN      Goal: Monitoring - monitor glucose and ketones as directed       Task: Provide education on blood glucose monitoring (purpose, proper technique, frequency, glucose targets, interpreting results, when to use glucose control solution, sharps disposal)    Responsible User: Tracie Steele RN      Task: Provide education on continuous glucose monitoring (sensor placement, use of mellissa or /reader, understanding glucose trends, alerts and alarms, differences between sensor glucose and blood glucose)    Responsible User: Tracie Steele RN      Task: Provide education on ketone monitoring (when to monitor, frequency, etc.)    Responsible User: Tracie Stelee RN      Goal: Taking Medication - patient is consistently taking medications as directed       Task: Provide education on action of prescribed medication, including when to take and possible side effects     Responsible User: Tracie Steele RN      Task: Provide education on insulin and injectable diabetes medications, including administration, storage, site selection and rotation for injection sites    Responsible User: Tracie Steele RN      Task: Discuss barriers to medication adherence with patient and provide management technique ideas as appropriate    Responsible User: Tracie Steele RN      Task: Provide education on frequency and refill details of medications    Responsible User: Tracie Steele RN      Goal: Problem Solving - know how to prevent and manage short-term diabetes complications       Task: Provide education on high blood glucose - causes, signs/symptoms, prevention and treatment Completed 10/26/2022   Responsible User: Tracie Steele RN      Task: Provide education on low blood glucose - causes, signs/symptoms, prevention, treatment, carrying a carbohydrate source at all times, and medical identification Completed 10/26/2022   Responsible User: Tracie Steele RN      Task: Provide education on safe travel with diabetes    Responsible User: Tracie Steele RN      Task: Provide education on how to care for diabetes on sick days    Responsible User: Tracie Steele RN      Task: Provide education on when to call a health care provider    Responsible User: Tracie Steele RN      Goal: Reducing Risks - know how to prevent and treat long-term diabetes complications       Task: Provide education on major complications of diabetes, prevention, early diagnostic measures and treatment of complications    Responsible User: Tracie Steele RN      Task: Provide education on recommended care for dental, eye and foot health    Responsible User: Tracie Steele RN      Task: Provide education on Hemoglobin A1c - goals and relationship to blood glucose levels    Responsible User: Tracie Steele RN      Task: Provide education on recommendations for heart health - lipid levels and  goals, blood pressure and goals, and aspirin therapy, if indicated    Responsible User: Tracie Steele RN      Task: Provide education on tobacco cessation    Responsible User: Tracie Steele RN      Goal: Healthy Coping - use available resources to cope with the challenges of managing diabetes       Task: Discuss recognizing feelings about having diabetes    Responsible User: Tracie Steele RN      Task: Provide education on the benefits of making appropriate lifestyle changes    Responsible User: Tracie Steele RN      Task: Provide education on benefits of utilizing support systems    Responsible User: Tracie Steele RN      Task: Discuss methods for coping with stress    Responsible User: Tracie Steele RN      Task: Provide education on when to seek professional counseling    Responsible User: Tracie Steele RN Vickie Baeyen BSN, RN, [facility]     Time Spent: 60 minutes  Encounter Type: Individual    Any diabetes medication dose changes were made via the CDE Protocol per the patient's referring provider. A copy of this encounter was shared with the provider.

## 2022-10-26 NOTE — PATIENT INSTRUCTIONS
Care Plan:  Meal Plan Recommendation: eat 3 meals a day, have small snacks between meals, if needed, use portion control, use plate planning method, and refer to the snack ideas  Exercise / activity plan: continue with your daily walking. On bad weather days - try adding indoor activity such as chair exercises, using a smartphone mellissa, walking the stairs in your home or dancing.   Check blood sugars - continue testing your blood sugars twice a day  Recommend continue with your medications    Follow up:  Follow-up for annual diabetes education review in 1 year or sooner, if needed.  Follow up with MTM as scheduled.     Bring blood glucose meter and logbook with you to all doctor and follow-up appointments.     [place] [facility] for the [facility]:  For Your Diabetes or Nutrition Education Appointments Call:  [phone]   For Diabetes or Nutrition Related Questions:   [phone]  Send [employer] Message   If you need a medication refill please contact your pharmacy. Please allow 3 business days for your refills to be completed.

## 2022-10-26 NOTE — PROGRESS NOTES
Diabetes Self-Management Education & Support    Presents for: Individual review    Type of Service: In Person Visit    Assessment Type:   ASSESSMENT:  Patient thought today's appointment was an eye exam and was not aware she was seeing diabetes education. She states she is working with [facility] on her diabetes, but since she is here she is willing to meet with me to discuss diabetes and her diet. She reports favoring carbohydrates at meals and snacks, she likes bread, rice and potatoes. She also walks daily with her sister, but if the weather isn't good she does not exercise.     Discussed pathophysiology of diabetes. Reviewed carbohydrate sources, portion control and the benefit of spreading intake throughout the day.  Encouraged 3 meals a day and small snacks between meals if hungry, limiting fruit to once serving at a time and balance meals. Reviewed benefits of exercise to help with better blood glucose utilization. discussed signs and symptoms of hypoglycemia and treatment.     Patient engaged in visit and feels she can make some diet changes and add some activity on the days the weather is not favorable for walking outside. she would like to continue diabetes management with [facility] and is not interested in follow up with diabetes education at this time.     Patient's most recent   Lab Results   Component Value Date    A1C 8.6 09/19/2022     is not meeting goal of <7.0    PLAN  Meal Plan Recommendation: eat 3 meals a day, have small snacks between meals, if needed, use portion control, use plate planning method, and refer to the snack ideas  Exercise / activity plan: continue with your daily walking. On bad weather days - try adding indoor activity such as chair exercises, using a smartphone mellissa, walking the stairs in your home or dancing.   Check blood sugars - continue testing your blood sugars twice a day  Recommend continue with your medications    Follow up:  Follow-up for annual diabetes education review in 1  "year or sooner, if needed.  Follow up with MTM as scheduled.    See Care Plan for co-developed, patient-state behavior change goals.  AVS provided for patient today.    Education Materials Provided:  [facility] [employer] To Understanding Diabetes Booklet, My Plate Planner and snack ideas      SUBJECTIVE/OBJECTIVE:  Presents for: Individual review  Accompanied by: Self, Other (brother paige Schaffer)  Diabetes education in the past 24mo: Yes  Focus of Visit: Patient Unsure  Diabetes type: Type 2  Disease course: Getting harder to manage  How confident are you filling out medical forms by yourself:: Quite a bit  Diabetes management related comments/concerns: thought today was an eye exam, working with MTM on diabetes management  Transportation concerns: No  Difficulty affording diabetes medication?: No  Difficulty affording diabetes testing supplies?: No  Other concerns:: None, English as a second language  Cultural Influences/Ethnic Background:   or     Diabetes Symptoms & Complications:  Fatigue: No  Neuropathy: No  Polydipsia: No  Polyphagia: Sometimes  Polyuria: Yes  Visual change: Sometimes  Slow healing wounds: No  Complications assessed today?: Yes  Autonomic neuropathy: No  CVA: No  Heart disease: Yes  Nephropathy: No  Peripheral neuropathy: Yes  Peripheral Vascular Disease: No  Retinopathy: Yes  Sexual dysfunction: No    Patient Problem List and Family Medical History reviewed for relevant medical history, current medical status, and diabetes risk factors.    Vitals:  There were no vitals taken for this visit.  Estimated body mass index is 31.16 kg/m  as calculated from the following:    Height as of 4/26/22: 1.422 m (4' 8\").    Weight as of 10/3/22: 63 kg (139 lb).   Last 3 BP:   BP Readings from Last 3 Encounters:   10/03/22 135/75   09/19/22 126/60   04/26/22 134/64       History   Smoking Status     Never   Smokeless Tobacco     Never       Labs:  Lab Results   Component Value Date    A1C 8.6 " 09/19/2022     Lab Results   Component Value Date    GLC 81 01/21/2022     Lab Results   Component Value Date    LDL 46 04/26/2022     Direct Measure HDL   Date Value Ref Range Status   04/26/2022 49 (L) >=50 mg/dL Final   ]  GFR Estimate   Date Value Ref Range Status   01/21/2022 >90 >60 mL/min/1.73m2 Final     Comment:     Effective December 21, 2021 eGFRcr in adults is calculated using the 2021 CKD-EPI creatinine equation which includes age and gender (Tank et al., NEJ, DOI: 10.1056/TBQGmf6016901)     No results found for: GFRESTBLACK  Lab Results   Component Value Date    CR 0.62 01/21/2022     No results found for: MICROALBUMIN    Healthy Eating:  Healthy Eating Assessed Today: Yes  Cultural/Moravian diet restrictions?: No  Meal planning/habits: Low salt  How many times a week on average do you eat food made away from home (restaurant/take-out)?: 1  Meals include: Breakfast, Lunch, Dinner, Evening Snack  Breakfast: bread ( size of bun) with avacado, coffee  Lunch: banana, tangerine  Dinner: tuna salad with potato and rice  Snacks: peanuts, hawaiian bread  Beverages: Water, Coffee, Diet soda  Has patient met with a dietitian in the past?: No    Being Active:  Being Active Assessed Today: Yes  Exercise:: Yes  Days per week of moderate to strenuous exercise (like a brisk walk): 7  On average, minutes per day of exercise at this level: 60  How intense was your typical exercise? : Light (like stretching or slow walking)  Exercise Minutes per Week: 420  Barrier to exercise: None    Monitoring:  Monitoring Assessed Today: Yes  Did patient bring glucose meter to appointment? : No (patient reports BG this morning 126 and yesterday 159)  Blood Glucose Meter: Accu-chek  Times checking blood sugar at home (number): 2  Times checking blood sugar at home (per): Day  Blood glucose trend: Fluctuating    Taking Medications:  Diabetes Medication(s)     Diabetic Other       glucose (BD GLUCOSE) 4 g chewable tablet    Take 4  tablets by mouth every 15 minutes as needed for low blood sugar     Patient not taking: Reported on 10/3/2022    Insulin       insulin glargine (LANTUS PEN) 100 UNIT/ML pen    Inject 33 Units Subcutaneous At Bedtime    Incretin Mimetic Agents (GLP-1 Receptor Agonists)       semaglutide (OZEMPIC, 0.25 OR 0.5 MG/DOSE,) 2 MG/1.5ML SOPN pen    Inject 0.25 mg Subcutaneous once a week for 28 days, THEN 0.5 mg once a week for 28 days.          Taking Medication Assessed Today: Yes  Current Treatments: Insulin Injections, Non-insulin Injectables  Dose schedule: Pre-lunch, Other (Ozempic on Wednesday)  Given by: Patient  Injection/Infusion sites: Thighs  Problems taking diabetes medications regularly?: No  Diabetes medication side effects?: No    Problem Solving:  Problem Solving Assessed Today: Yes  Is the patient at risk for hypoglycemia?: Yes  Hypoglycemia Frequency: Rarely (once last week)  Hypoglycemia Treatment: Juice    Hypoglycemia symptoms  Confusion: No  Dizziness or Light-Headedness: Yes (and heart beating fast)  Headaches: No  Hunger: No  Mood changes: No  Nervousness/Anxiety: No  Sleepiness: No  Speech difficulty: No  Sweats: No  Feeling shaky: No    Hypoglycemia Complications  Blackouts: Yes  Hospitalization: No  Nocturnal hypoglycemia: No  Required assistance: No  Required glucagon injection: No  Seizures: No    Reducing Risks:  Reducing Risks Assessed Today: Yes  Diabetes Risks: Age over 45 years, Family History  CAD Risks: Diabetes Mellitus, Dyslipidemia, Sedentary lifestyle  Has dilated eye exam at least once a year?: No  Sees dentist every 6 months?: No  Feet checked by healthcare provider in the last year?: No    Healthy Coping:  Healthy Coping Assessed Today: Yes  Emotional response to diabetes: Ready to learn  Informal Support system:: Family  Stage of change: PREPARATION (Decided to change - considering how)  Patient Activation Measure Survey Score:  No flowsheet data found.      Care Plan and  Education Provided:  Care Plan: Diabetes   Updates made by Tracie Steele RN since 10/26/2022 12:00 AM      Problem: HbA1C Not In Goal       Goal: Establish Regular Follow-Ups with PCP       Task: Discuss with PCP the recommended timing for patient's next follow up visit(s)    Responsible User: Tracie Steele RN      Task: Discuss schedule for PCP visits with patient    Responsible User: Tracie Steele RN      Goal: Get HbA1C Level in Goal       Task: Educate patient on diabetes education self-management topics    Responsible User: Tracie Steele RN      Task: Educate patient on benefits of regular glucose monitoring    Responsible User: Tracie Steele RN      Task: Refer patient to appropriate extended care team member, as needed (Medication Therapy Management, Behavioral Health, Physical Therapy, etc.)    Responsible User: Tracie Steele RN      Task: Discuss diabetes treatment plan with patient    Responsible User: Tracie Steele RN      Problem: Diabetes Self-Management Education Needed to Optimize Self-Care Behaviors       Goal: Understand diabetes pathophysiology and disease progression       Task: Provide education on diabetes pathophysiology and disease progression specfic to patient's diabetes type Completed 10/26/2022   Responsible User: Tracie Steele RN      Goal: Healthy Eating - follow a healthy eating pattern for diabetes       Task: Provide education on portion control and consistency in amount, composition and timing of food intake Completed 10/26/2022   Responsible User: Tracie Steele RN      Task: Provide education on managing carbohydrate intake (carbohydrate counting, plate planning method, etc.) Completed 10/26/2022   Responsible User: Tracie Steele RN      Task: Provide education on weight management    Responsible User: Tracie Steele RN      Task: Provide education on heart healthy eating    Responsible User: Tracie Steele RN      Task: Provide  education on eating out    Responsible User: Tracie Steele RN      Task: Develop individualized healthy eating plan with patient    Responsible User: Tracie Steele RN      Goal: Being Active - get regular physical activity, working up to at least 150 minutes per week       Task: Provide education on relationship of activity to glucose and precautions to take if at risk for low glucose Completed 10/26/2022   Responsible User: Tracie Steele RN      Task: Discuss barriers to physical activity with patient    Responsible User: Tracie Steele RN      Task: Develop physical activity plan with patient    Responsible User: Tracie Steele RN      Task: Explore community resources including walking groups, assistance programs, and home videos    Responsible User: Tracie Steele RN      Goal: Monitoring - monitor glucose and ketones as directed       Task: Provide education on blood glucose monitoring (purpose, proper technique, frequency, glucose targets, interpreting results, when to use glucose control solution, sharps disposal)    Responsible User: Tracie Steele RN      Task: Provide education on continuous glucose monitoring (sensor placement, use of mellissa or /reader, understanding glucose trends, alerts and alarms, differences between sensor glucose and blood glucose)    Responsible User: Tracie Steele RN      Task: Provide education on ketone monitoring (when to monitor, frequency, etc.)    Responsible User: Tracie Steele RN      Goal: Taking Medication - patient is consistently taking medications as directed       Task: Provide education on action of prescribed medication, including when to take and possible side effects    Responsible User: Tracie Steele RN      Task: Provide education on insulin and injectable diabetes medications, including administration, storage, site selection and rotation for injection sites    Responsible User: Tracie Steele RN      Task: Discuss  barriers to medication adherence with patient and provide management technique ideas as appropriate    Responsible User: Tracie Steele RN      Task: Provide education on frequency and refill details of medications    Responsible User: Tracie Steele RN      Goal: Problem Solving - know how to prevent and manage short-term diabetes complications       Task: Provide education on high blood glucose - causes, signs/symptoms, prevention and treatment Completed 10/26/2022   Responsible User: Tracie Steele RN      Task: Provide education on low blood glucose - causes, signs/symptoms, prevention, treatment, carrying a carbohydrate source at all times, and medical identification Completed 10/26/2022   Responsible User: Tracie Steele RN      Task: Provide education on safe travel with diabetes    Responsible User: Tracie Steele RN      Task: Provide education on how to care for diabetes on sick days    Responsible User: Tracie Steele RN      Task: Provide education on when to call a health care provider    Responsible User: Tracie Steele RN      Goal: Reducing Risks - know how to prevent and treat long-term diabetes complications       Task: Provide education on major complications of diabetes, prevention, early diagnostic measures and treatment of complications    Responsible User: Tracie Steele RN      Task: Provide education on recommended care for dental, eye and foot health    Responsible User: Tracie Steele RN      Task: Provide education on Hemoglobin A1c - goals and relationship to blood glucose levels    Responsible User: Tracie Steele RN      Task: Provide education on recommendations for heart health - lipid levels and goals, blood pressure and goals, and aspirin therapy, if indicated    Responsible User: Tracie Steele RN      Task: Provide education on tobacco cessation    Responsible User: Tracie Steele RN      Goal: Healthy Coping - use available resources to cope  with the challenges of managing diabetes       Task: Discuss recognizing feelings about having diabetes    Responsible User: Tracie Steele RN      Task: Provide education on the benefits of making appropriate lifestyle changes    Responsible User: Tracie Steele RN      Task: Provide education on benefits of utilizing support systems    Responsible User: Tracie Steele RN      Task: Discuss methods for coping with stress    Responsible User: Tracie Steele RN      Task: Provide education on when to seek professional counseling    Responsible User: Tracie Steele RN Vickie Baeyen BSN, RN, [facility]     Time Spent: 60 minutes  Encounter Type: Individual    Any diabetes medication dose changes were made via the CDE Protocol per the patient's referring provider. A copy of this encounter was shared with the provider.

## 2022-10-31 ENCOUNTER — OFFICE VISIT (OUTPATIENT)
Dept: PHARMACY | Facility: CLINIC | Age: 72
End: 2022-10-31
Payer: COMMERCIAL

## 2022-10-31 VITALS
DIASTOLIC BLOOD PRESSURE: 72 MMHG | HEART RATE: 86 BPM | SYSTOLIC BLOOD PRESSURE: 128 MMHG | WEIGHT: 138.1 LBS | BODY MASS INDEX: 30.96 KG/M2

## 2022-10-31 DIAGNOSIS — E11.9 TYPE 2 DIABETES MELLITUS WITHOUT COMPLICATION, WITH LONG-TERM CURRENT USE OF INSULIN (H): ICD-10-CM

## 2022-10-31 DIAGNOSIS — Z79.4 TYPE 2 DIABETES MELLITUS WITHOUT COMPLICATION, WITH LONG-TERM CURRENT USE OF INSULIN (H): ICD-10-CM

## 2022-10-31 PROCEDURE — 99207 PR NO CHARGE LOS: CPT | Performed by: PHARMACIST

## 2022-10-31 RX ORDER — SEMAGLUTIDE 1.34 MG/ML
0.5 INJECTION, SOLUTION SUBCUTANEOUS WEEKLY
Qty: 1.5 ML | Refills: 1 | Status: SHIPPED | OUTPATIENT
Start: 2022-10-31 | End: 2022-11-30

## 2022-10-31 NOTE — Clinical Note
Ozempic is going well and blood sugars seem to be coming down especially postprandials.  She will be increasing Ozempic this week.  I decreased Lantus slightly to prevent hypoglycemia.  She did report some itching of her lips after the first 2 injection of Ozempic but no reaction like that from the last 2 and she is given 4 shots total.  I educated her on allergic reaction symptoms, see plan.  I think we should switch her to an ARB but I did not make that change today because of the itching reaction and I did not want to complicate it with adding a new medication.  Let me know if you think we should start an ARB regardless based on urine albumin.  We could probably replace the amlodipine with an ARB.  She is leaving for [place] 12/6 for a couple of months so she will be following up with me November 30 with an A1c and then scheduling with you when she gets back from [place].  If we do not start the ARB now we should probably wait until she gets back so we can monitor her renal. Let me know your thoughts.  Shae

## 2022-10-31 NOTE — PROGRESS NOTES
Medication Therapy Management (MTM) Encounter    ASSESSMENT:                            Medication Adherence/Access: No issues identified    Type 2 Diabetes: Patient is not meeting A1c goal of < 7%. Self monitoring of blood glucose is not at goal of post prandial < 150 mg/dL and fasting readings are not always at goal of  however some are getting on the lower end at times. Patient would benefit from GLP-1 agonist (Ozempic) :  increasing dose : 0.5 mg once weekly starting this week.  To prevent hypoglycemia in the mornings will decrease Lantus today as well, see plan.  We discussed watching for any return of the itchy lips and especially other signs of allergic reaction such as swelling of her face or lips, trouble breathing or even feeling a sense of thickness in the throat.  I told her to get care immediately as this can be signs of an allergic reaction.  Since she has not had any itching of the lips the last 2 times she used it I do not think it is an allergic reaction but still educated especially since we are increasing the dose. Microalbumin is not at goal < 30 mg/g. Not taking an ACE-inhibitor or ARB; is indicated. Depending on blood pressure could switch amlodipine to an ACE or ARB.  I did not want to start a new medication today since she has had some possible allergy reaction to Ozempic.  We will discuss switching at next visit.  Since patient has not been on aspirin educated her not to restart since she would be using it for primary prevention issues over the age of 70 and is at increased risk for bleeding.  I did discuss with her PCP and she agrees.  Discontinued aspirin from her medication list.     PLAN:                            1. Schedule diabetes follow with DIETER [facility]anda for when you get back from [place].    2. Increase the Ozempic to 0.5 mg once weekly this Wednesday. If you notice the itching around your lips come back when you give you injection this week let us know. Watch for any  other signs of an allergic reaction like swelling of the face/lips or throat.  If you have any of these reactions call 911 or have someone bring you to the ER right away.     3. Decrease Lantus to 30 units once daily starting Wednesday when you increase your Ozempic.    4. As discussed at last visit, you can try using Flonase nasal spray if you have nasal allergy symptoms or you can get a Zaditor eye drop if you have eye allergy symptoms.     At next visit, we should discuss adding an ARB or switching amlodipine to an ARB at next visit with urine albumin >30.     Follow-up: Return in 1 month (on 11/30/2022) for MTM office visit with Shae Ortiz, Lab Work.    SUBJECTIVE/OBJECTIVE:                          Linnette Monsivais is a 72 year old female coming in for a follow-up visit. Patient was accompanied by her brother in [facility] [place]. Today's visit is a follow-up MTM visit from 10/3/22     Reason for visit: diabetes follow-up.    - going to [place] and leaving 12/6/22. She will be gone for 2-3 months.    Allergies/ADRs: Reviewed in chart  Past Medical History: Reviewed in chart  Tobacco: She reports that she has never smoked. She has never used smokeless tobacco.  Alcohol: not currently using  Personal Healthcare Goals: get blood sugar down    Specialty Providers  Psychiatrist: Dr. Valadez - seeing every 6 months with next visit in November    Medication Adherence/Access: Patient takes medications directly from bottles.  Patient takes medications 2 time(s) per day.   Per patient, misses medication 0 times per week.  Did not take while on vacation due to not having pills.   Medication barriers: none.   The patient fills medications at [place]: YES.    Type 2 Diabetes:  Currently taking Lantus 33 units once daily and Ozempic 0.25 mg weekly on Wednesdays.  Januvia 100 mg once daily was stopped at last MTM visit when Ozempic was started. Patient is not experiencing side effects since after starting Ozempic. She does report  she had some itching on her lips after the first two injections that lasted no longer than an hour. No reaction after the last two injections. She reports no swelling of her lips, face or throat and no shortness of breath.   Blood sugar monitorin time(s) daily. Ranges (from patient's glucose log):                 From last MTM visit:  Fasting- 100-150's  Post-Prandial (3 hr after dinner)- all over 200 and some in the 300's    Symptoms of low blood sugar? none  Symptoms of high blood sugar? polyuria and fatigue  Eye exam: due - unknown history of retinopathy  Foot exam: up to date  Diet/Exercise: She feels that she is eating less, feels less hungry. She is still eating three meals a day. She is still walking outside 5-6 days a week for 1 hour each time.   Aspirin: Not 81mg daily and dis not sure how long it has been since she took this.   Statin: Yes: rosuvastatin   ACEi/ARB: No.   Urine Albumin:   Lab Results   Component Value Date    UMALCR 52.08 (H) 2022      Lab Results   Component Value Date    A1C 8.6 2022    A1C 6.1 2022    A1C 7.7 2022    A1C 9.3 11/10/2021     Last Comprehensive Metabolic Panel:  Sodium   Date Value Ref Range Status   2022 141 133 - 144 mmol/L Final     Potassium   Date Value Ref Range Status   2022 3.6 3.4 - 5.3 mmol/L Final     Chloride   Date Value Ref Range Status   2022 109 94 - 109 mmol/L Final     Carbon Dioxide (CO2)   Date Value Ref Range Status   2022 25 20 - 32 mmol/L Final     Anion Gap   Date Value Ref Range Status   2022 7 3 - 14 mmol/L Final     Glucose   Date Value Ref Range Status   2022 81 70 - 99 mg/dL Final     Urea Nitrogen   Date Value Ref Range Status   2022 25 7 - 30 mg/dL Final     Creatinine   Date Value Ref Range Status   2022 0.62 0.52 - 1.04 mg/dL Final     GFR Estimate   Date Value Ref Range Status   2022 >90 >60 mL/min/1.73m2 Final     Comment:     Effective 2021  eGFRcr in adults is calculated using the 2021 CKD-EPI creatinine equation which includes age and gender (Tank et al., NE, DOI: 10.1056/EWJFaz4980453)     Calcium   Date Value Ref Range Status   01/21/2022 8.7 8.5 - 10.1 mg/dL Final     BP Readings from Last 3 Encounters:   10/31/22 128/72   10/03/22 135/75   09/19/22 126/60     Today's Vitals: /72   Pulse 86   Wt 138 lb 1.6 oz (62.6 kg)   BMI 30.96 kg/m    ----------------    I spent 30 minutes with this patient today. All changes were made via collaborative practice agreement with Myesha Crenshaw PA-C. A copy of the visit note was provided to the patient's provider(s).    The patient was given a summary of these recommendations.     Shae Ortiz, PharmD  Medication Therapy Management Pharmacist     Medication Therapy Recommendations  Type 2 diabetes mellitus without complication, with long-term current use of insulin (H)    Current Medication: insulin glargine (LANTUS PEN) 100 UNIT/ML pen (Discontinued)   Rationale: Dose too high - Dosage too high - Safety   Recommendation: Decrease Dose - Lantus SoloStar 100 UNIT/ML soln   Status: Accepted per CPA

## 2022-10-31 NOTE — PATIENT INSTRUCTIONS
"Recommendations from today's MTM visit:                                                       1. Schedule diabetes follow with DIETER Panda early December before your leave. If you cant get in to see her the schedule with me on 12/5.    2. Increase the Ozempic to 0.5 mg once weekly this Wednesday. If you notice the itching around your lips come back when you give you injection this week let us know. Watch for any other signs of an allergic reaction like swelling of the face/lips or throat.  If you have any of these reactions call 911 or have someone bring you to the ER right away.     3. Decrease Lantus to 30 units once daily starting Wednesday when you increase your Ozempic.    4.  You can try using flonase nasal spray if you have nasal allergy symptoms or you can get a Zaditor eye drop if you have eye allergy symptoms.     Follow-up: Return in 1 month (on 11/30/2022) for MTM office visit with Shae Ortiz, Lab Work.    It was great speaking with you today.  I value your experience and would be very thankful for your time in providing feedback in our clinic survey. In the next few days, you may receive an email or text message from [employer] with a link to a survey related to your  clinical pharmacist.\"     To schedule another MTM appointment, please call the clinic directly or you may call the MTM scheduling line at [phone] or toll-free at [phone].     My Clinical Pharmacist's contact information:                                                      Please feel free to contact me with any questions or concerns you have.      Shae Ortiz, PharmD  Medication Therapy Management Pharmacist    "

## 2022-11-30 ENCOUNTER — LAB (OUTPATIENT)
Dept: LAB | Facility: CLINIC | Age: 72
End: 2022-11-30
Payer: COMMERCIAL

## 2022-11-30 ENCOUNTER — OFFICE VISIT (OUTPATIENT)
Dept: PHARMACY | Facility: CLINIC | Age: 72
End: 2022-11-30
Payer: COMMERCIAL

## 2022-11-30 VITALS — HEART RATE: 80 BPM | SYSTOLIC BLOOD PRESSURE: 143 MMHG | DIASTOLIC BLOOD PRESSURE: 77 MMHG

## 2022-11-30 DIAGNOSIS — E11.9 TYPE 2 DIABETES MELLITUS WITHOUT COMPLICATION, WITH LONG-TERM CURRENT USE OF INSULIN (H): ICD-10-CM

## 2022-11-30 DIAGNOSIS — Z79.4 TYPE 2 DIABETES MELLITUS WITHOUT COMPLICATION, WITH LONG-TERM CURRENT USE OF INSULIN (H): ICD-10-CM

## 2022-11-30 DIAGNOSIS — H93.8X1 IRRITATION OF EAR, RIGHT: ICD-10-CM

## 2022-11-30 DIAGNOSIS — I10 PRIMARY HYPERTENSION: Primary | ICD-10-CM

## 2022-11-30 LAB — HBA1C MFR BLD: 6.8 % (ref 0–5.6)

## 2022-11-30 PROCEDURE — 36415 COLL VENOUS BLD VENIPUNCTURE: CPT

## 2022-11-30 PROCEDURE — 99207 PR NO CHARGE LOS: CPT | Performed by: PHARMACIST

## 2022-11-30 PROCEDURE — 83036 HEMOGLOBIN GLYCOSYLATED A1C: CPT

## 2022-11-30 RX ORDER — AMLODIPINE BESYLATE 10 MG/1
10 TABLET ORAL DAILY
Qty: 90 TABLET | Refills: 0 | Status: SHIPPED | OUTPATIENT
Start: 2022-11-30 | End: 2023-05-17

## 2022-11-30 RX ORDER — SEMAGLUTIDE 1.34 MG/ML
0.5 INJECTION, SOLUTION SUBCUTANEOUS WEEKLY
Qty: 4.5 ML | Refills: 1 | Status: SHIPPED | OUTPATIENT
Start: 2022-11-30 | End: 2023-04-05

## 2022-11-30 NOTE — Clinical Note
Her A1c is now at goal!  Her blood pressure was a little high today and has been borderline high recently.  She is leaving the country for 3 months so I do not want to switch amlodipine to an ARB but instead just increased amlodipine and will recheck urine albumin when she is back in April and then make the switch if needed at that time.  When would you like to see her again?  Shae

## 2022-11-30 NOTE — PATIENT INSTRUCTIONS
"Recommendations from today's MTM visit:                                                         1. Schedule with a provider to look at your ear before you leave.    2. Call Karen Jignakathy's office and find out what labs she wants. If it is the A1C we can send it but if she wants other labs she will need to call the clinic to order those and you will need to go to lab again to get more blood drawn. Here is the phone number [phone]    3. Keep your medicines the same for now. If you start to have blood sugars less than 70 at anytime, decrease your Lantus to 28 units once daily. If you continue to have low blood sugars less than 70 then keep decreasing the Lantus by 2 units every couple of days until the lows stop.    4. Nice work getting your A1C down less than 7%!    5. Increase amlodipine to 10 mg once daily. You can take two of the 5 mg tablets once daily to use those up. Then start one of the 10 mg tablets.     6. When you get back from [place] we will follow up and recheck your urine for protein. If you still have protein in your urine we should talk about switching your amlodipine to a different medicine that lowers blood pressure and helps protect your kidney.    Follow-up: Return in 4 months (on 4/3/2023) for MTM office visit with Shae Ortiz.    It was great speaking with you today.  I value your experience and would be very thankful for your time in providing feedback in our clinic survey. In the next few days, you may receive an email or text message from [employer] with a link to a survey related to your  clinical pharmacist.\"     To schedule another MTM appointment, please call the clinic directly or you may call the MTM scheduling line at [phone] or toll-free at [phone].     My Clinical Pharmacist's contact information:                                                      Please feel free to contact me with any questions or concerns you have.      Shae Ortiz, PharmD  Medication Therapy " Management Pharmacist  [facility] - Monday and Wednesday 7:30 - 4:00  Pager: [phone]

## 2022-11-30 NOTE — PROGRESS NOTES
Medication Therapy Management (MTM) Encounter    ASSESSMENT:                            Medication Adherence/Access: No issues identified    Ear irritation: I recommended she schedule with a provider to have her look at her ear before she leaves next week for [place].    Type 2 Diabetes: Patient is meeting A1c goal of < 7%. Self monitoring of blood glucose is at goal of post prandial < 150 mg/dL most of the time and fasting readings are mostly at goal of . Patient would benefit from continuing current dose of Ozempic and Lantus since she will be leaving town.  I feel it is best not to make changes when were not able to follow-up and A1c is at goal.  Did advise her to decrease her Lantus dose if she starts to have blood sugars less than 70, see plan.  Microalbumin is not at goal < 30 mg/g. Not taking an ACE-inhibitor or ARB; is indicated and would have started it today if she was not leaving the country for 3 months.  We discussed the benefits of using an ACE or an ARB for blood pressure as well as kidney protection because she has protein in her urine.  I told her we will recheck her urine albumin in April once she returns from her trip and determine if switching amlodipine to an ACE or an ARB is appropriate at that time.  Patient is not taking aspirin due to age.      Hypertension: Patient is not meeting blood pressure goal of < 140/90mmHg.  She would benefit from switching amlodipine to an ARB due having diabetes and showing protein in her urine however she is leaving town and would need to recheck kidney function after making this change.  Instead, we will increase amlodipine to 10 mg once daily.  Discussed possible side effects when increasing amlodipine and advised to check blood pressure if having lightheadedness and dizziness.  When she returns in about 3 months we can recheck blood pressure and urine albumin and determine if switching to an ARB is more appropriate at that time.    PLAN:                             1. Schedule with a provider to look at your ear before you leave.    2. Call Karen Valadez's office and find out what labs she wants. If it is the A1C we can send it but if she wants other labs she will need to call the clinic to order those and you will need to go to lab again to get more blood drawn. Here is the phone number [phone]    3. Keep your medicines the same for now. If you start to have blood sugars less than 70 at anytime, decrease your Lantus to 28 units once daily. If you continue to have low blood sugars less than 70 then keep decreasing the Lantus by 2 units every couple of days until the lows stop.    4. Nice work getting your A1C down less than 7%!    5. Increase amlodipine to 10 mg once daily. You can take two of the 5 mg tablets once daily to use those up. Then start one of the 10 mg tablets.     6. When you get back from [place] we will follow up and recheck your urine for protein. If you still have protein in your urine we should talk about switching your amlodipine to a different medicine that lowers blood pressure and helps protect your kidney.    Follow-up: Return in 4 months (on 4/3/2023) for MTM office visit with Shae Ortiz.    SUBJECTIVE/OBJECTIVE:                          Linnette Monsivais is a 72 year old female coming in for a follow-up visit. Patient was accompanied by her brother-in-law Sarbjit. Today's visit is a follow-up MTM visit from 10/31/2022     Reason for visit: diabetes follow-up.    Allergies/ADRs: Reviewed in chart  Past Medical History: Reviewed in chart  Tobacco: She reports that she has never smoked. She has never used smokeless tobacco.  Alcohol: not currently using  Personal Healthcare Goals: get blood sugar down    Specialty Providers  Psychiatrist: Dr. Valadez - seeing every 6 months with next visit in November    Medication Adherence/Access: Patient takes medications directly from bottles.  Patient takes medications 2 time(s) per day.   Per patient,  "misses medication 0 times per week.  Medication barriers: none.   The patient fills medications at [facility]: YES.    Ear irritation: Patient reports that she feels like there is something \"spirally\" in her ear is something very small and white came out at one point but she is not sure what it was.  She also reports some pain going into her throat and neck area.    Type 2 Diabetes:  Currently taking Lantus 30 units once daily (decreased at last MTM visit) and Ozempic 0.5 mg weekly on  (increased at last MTM visit).  Patient is not experiencing side effects since after starting Ozempic.  Patient reports that the itching of her lips has not returned.  She reports no swelling of her lips, face or throat and no shortness of breath with the increasing Ozempic.     Medication history: She has taken Januvia in the past but was stopped when Ozempic was started.    Blood sugar monitorin time(s) daily. Ranges (from patient's glucose log):             From last MTM visit:                Symptoms of low blood sugar? none  Symptoms of high blood sugar? polyuria and fatigue  Eye exam: up to date  Foot exam: up to date  Diet/Exercise: She feels that she is eating less, feels less hungry. She is still eating three meals a day. She is still walking outside 5-6 days a week for 1 hour each time.   Aspirin: Not 81mg daily and dis not sure how long it has been since she took this.   Statin: Yes: rosuvastatin   ACEi/ARB: No.   Urine Albumin:   Lab Results   Component Value Date    UMALCR 52.08 (H) 2022      Lab Results   Component Value Date    A1C 6.8 2022    A1C 8.6 2022    A1C 6.1 2022    A1C 7.7 2022    A1C 9.3 11/10/2021       Last Comprehensive Metabolic Panel:  Sodium   Date Value Ref Range Status   2022 141 133 - 144 mmol/L Final     Potassium   Date Value Ref Range Status   2022 3.6 3.4 - 5.3 mmol/L Final     Chloride   Date Value Ref Range Status   2022 109 94 - 109 " mmol/L Final     Carbon Dioxide (CO2)   Date Value Ref Range Status   01/21/2022 25 20 - 32 mmol/L Final     Anion Gap   Date Value Ref Range Status   01/21/2022 7 3 - 14 mmol/L Final     Glucose   Date Value Ref Range Status   01/21/2022 81 70 - 99 mg/dL Final     Urea Nitrogen   Date Value Ref Range Status   01/21/2022 25 7 - 30 mg/dL Final     Creatinine   Date Value Ref Range Status   01/21/2022 0.62 0.52 - 1.04 mg/dL Final     GFR Estimate   Date Value Ref Range Status   01/21/2022 >90 >60 mL/min/1.73m2 Final     Comment:     Effective December 21, 2021 eGFRcr in adults is calculated using the 2021 CKD-EPI creatinine equation which includes age and gender (Tank et al., NEJ, DOI: 10.1056/OKSYrk6974575)     Calcium   Date Value Ref Range Status   01/21/2022 8.7 8.5 - 10.1 mg/dL Final     Hypertension: Current medications include amlodipine 5 mg once daily.  Patient does not self-monitor blood pressure.  Patient reports no current medication side effects.  BP Readings from Last 3 Encounters:   11/30/22 (!) 143/77   10/31/22 128/72   10/03/22 135/75     Today's Vitals: BP (!) 143/77   Pulse 80   ----------------    I spent 40 minutes with this patient today. All changes were made via collaborative practice agreement with Myesha Crenshaw PA-C. A copy of the visit note was provided to the patient's provider(s).    A summary of these recommendations was given to the patient.    Shae Ortiz, PharmD  Medication Therapy Management Pharmacist     Medication Therapy Recommendations  Primary hypertension    Current Medication: amLODIPine (NORVASC) 5 MG tablet (Discontinued)   Rationale: Dose too low - Dosage too low - Effectiveness   Recommendation: Increase Dose - amLODIPine 10 MG tablet   Status: Accepted per CPA

## 2022-12-01 ENCOUNTER — OFFICE VISIT (OUTPATIENT)
Dept: FAMILY MEDICINE | Facility: CLINIC | Age: 72
End: 2022-12-01
Payer: COMMERCIAL

## 2022-12-01 VITALS
WEIGHT: 136.5 LBS | BODY MASS INDEX: 29.45 KG/M2 | RESPIRATION RATE: 16 BRPM | SYSTOLIC BLOOD PRESSURE: 120 MMHG | DIASTOLIC BLOOD PRESSURE: 60 MMHG | HEIGHT: 57 IN | HEART RATE: 82 BPM | OXYGEN SATURATION: 100 % | TEMPERATURE: 97.8 F

## 2022-12-01 DIAGNOSIS — H65.03 NON-RECURRENT ACUTE SEROUS OTITIS MEDIA OF BOTH EARS: Primary | ICD-10-CM

## 2022-12-01 PROCEDURE — 99213 OFFICE O/P EST LOW 20 MIN: CPT | Performed by: NURSE PRACTITIONER

## 2022-12-01 RX ORDER — CETIRIZINE HYDROCHLORIDE 10 MG/1
10 TABLET ORAL DAILY
Qty: 30 TABLET | Refills: 0 | Status: SHIPPED | OUTPATIENT
Start: 2022-12-01 | End: 2023-04-05

## 2022-12-01 RX ORDER — FLUTICASONE PROPIONATE 50 MCG
1 SPRAY, SUSPENSION (ML) NASAL DAILY
Qty: 16 ML | Refills: 0 | Status: SHIPPED | OUTPATIENT
Start: 2022-12-01 | End: 2023-04-05

## 2022-12-01 ASSESSMENT — PAIN SCALES - GENERAL: PAINLEVEL: SEVERE PAIN (6)

## 2022-12-01 NOTE — PROGRESS NOTES
"  Assessment & Plan     Non-recurrent acute serous otitis media of both ears  Acute; discussed causes and advised flonase daily at bedtime and zyrtec daily for two weeks should see improvement in 3-5 days.    - fluticasone (FLONASE) 50 MCG/ACT nasal spray; Spray 1 spray into both nostrils daily  - cetirizine (ZYRTEC) 10 MG tablet; Take 1 tablet (10 mg) by mouth daily       BMI:   Estimated body mass index is 29.54 kg/m  as calculated from the following:    Height as of this encounter: 1.448 m (4' 9\").    Weight as of this encounter: 61.9 kg (136 lb 8 oz).         Return in about 1 week (around 12/8/2022) for if symptoms do not improve and/or worsen.    CHRIST Pimentel CNP  [facility] JACOB Anglin is a 72 year old accompanied by her Sarbjit Brother in law , presenting for the following health issues:  Ear Problem      HPI     Concern - Right Ear   Onset: 2 weeks ago   Description: Patient states that the inside of her right ear hurts, itches and the pain travels to her throat   Intensity: moderate  Progression of Symptoms:  worsening  Accompanying Signs & Symptoms: Sore throat   Previous history of similar problem: No   Precipitating factors:        Worsened by: Nothing   Alleviating factors:        Improved by: Nothing   Therapies tried and outcome: None      Patient states she feels like something is \"crawling in her ear\" and notes pressure from her ear into jaw and down her throat.       Review of Systems   Constitutional, HEENT, cardiovascular, pulmonary, gi and gu systems are negative, except as otherwise noted.      Objective    /60 (BP Location: Right arm, Patient Position: Sitting, Cuff Size: Adult Regular)   Pulse 82   Temp 97.8  F (36.6  C) (Oral)   Resp 16   Ht 1.448 m (4' 9\")   Wt 61.9 kg (136 lb 8 oz)   SpO2 100%   BMI 29.54 kg/m    Body mass index is 29.54 kg/m .  Physical Exam   GENERAL: healthy, alert and no distress  HENT: normal cephalic/atraumatic " and both ears: clear effusion  NECK: no adenopathy, no asymmetry, masses, or scars and thyroid normal to palpation  RESP: lungs clear to auscultation - no rales, rhonchi or wheezes  CV: regular rate and rhythm, normal S1 S2, no S3 or S4, no murmur, click or rub, no peripheral edema and peripheral pulses strong

## 2023-04-05 ENCOUNTER — LAB (OUTPATIENT)
Dept: LAB | Facility: CLINIC | Age: 73
End: 2023-04-05
Payer: COMMERCIAL

## 2023-04-05 ENCOUNTER — DOCUMENTATION ONLY (OUTPATIENT)
Dept: LAB | Facility: CLINIC | Age: 73
End: 2023-04-05

## 2023-04-05 ENCOUNTER — OFFICE VISIT (OUTPATIENT)
Dept: PHARMACY | Facility: CLINIC | Age: 73
End: 2023-04-05
Payer: COMMERCIAL

## 2023-04-05 VITALS
BODY MASS INDEX: 29.86 KG/M2 | WEIGHT: 138 LBS | SYSTOLIC BLOOD PRESSURE: 126 MMHG | DIASTOLIC BLOOD PRESSURE: 70 MMHG | OXYGEN SATURATION: 96 % | HEART RATE: 79 BPM

## 2023-04-05 DIAGNOSIS — E03.9 ACQUIRED HYPOTHYROIDISM: ICD-10-CM

## 2023-04-05 DIAGNOSIS — Z79.899 HIGH RISK MEDICATION USE: Primary | ICD-10-CM

## 2023-04-05 DIAGNOSIS — E11.9 TYPE 2 DIABETES MELLITUS WITHOUT COMPLICATION, WITH LONG-TERM CURRENT USE OF INSULIN (H): Primary | ICD-10-CM

## 2023-04-05 DIAGNOSIS — Z13.84 ENCOUNTER FOR SCREENING FOR DENTAL DISORDER: ICD-10-CM

## 2023-04-05 DIAGNOSIS — Z79.4 TYPE 2 DIABETES MELLITUS WITHOUT COMPLICATION, WITH LONG-TERM CURRENT USE OF INSULIN (H): Primary | ICD-10-CM

## 2023-04-05 DIAGNOSIS — E03.9 ACQUIRED HYPOTHYROIDISM: Primary | ICD-10-CM

## 2023-04-05 DIAGNOSIS — H93.8X1 IRRITATION OF EAR, RIGHT: ICD-10-CM

## 2023-04-05 DIAGNOSIS — I10 PRIMARY HYPERTENSION: ICD-10-CM

## 2023-04-05 LAB
ALT SERPL W P-5'-P-CCNC: 31 U/L (ref 10–35)
AST SERPL W P-5'-P-CCNC: 20 U/L (ref 10–35)
BUN SERPL-MCNC: 24 MG/DL (ref 8–23)
CHOLEST SERPL-MCNC: 160 MG/DL
CREAT SERPL-MCNC: 0.63 MG/DL (ref 0.51–0.95)
FASTING STATUS PATIENT QL REPORTED: YES
GFR SERPL CREATININE-BSD FRML MDRD: >90 ML/MIN/1.73M2
GLUCOSE SERPL-MCNC: 185 MG/DL (ref 70–99)
HBA1C MFR BLD: 8.1 % (ref 0–5.6)
HDLC SERPL-MCNC: 45 MG/DL
HOLD SPECIMEN: NORMAL
LDLC SERPL CALC-MCNC: 87 MG/DL
NONHDLC SERPL-MCNC: 115 MG/DL
T4 FREE SERPL-MCNC: 1.02 NG/DL (ref 0.9–1.7)
TRIGL SERPL-MCNC: 138 MG/DL
TSH SERPL DL<=0.005 MIU/L-ACNC: 7.46 UIU/ML (ref 0.3–4.2)

## 2023-04-05 PROCEDURE — 84439 ASSAY OF FREE THYROXINE: CPT

## 2023-04-05 PROCEDURE — 83036 HEMOGLOBIN GLYCOSYLATED A1C: CPT

## 2023-04-05 PROCEDURE — 99207 PR NO CHARGE LOS: CPT | Performed by: PHARMACIST

## 2023-04-05 PROCEDURE — 84443 ASSAY THYROID STIM HORMONE: CPT

## 2023-04-05 PROCEDURE — 36415 COLL VENOUS BLD VENIPUNCTURE: CPT

## 2023-04-05 PROCEDURE — 84520 ASSAY OF UREA NITROGEN: CPT

## 2023-04-05 PROCEDURE — 80061 LIPID PANEL: CPT

## 2023-04-05 PROCEDURE — 82565 ASSAY OF CREATININE: CPT

## 2023-04-05 PROCEDURE — 84460 ALANINE AMINO (ALT) (SGPT): CPT

## 2023-04-05 PROCEDURE — 84450 TRANSFERASE (AST) (SGOT): CPT

## 2023-04-05 PROCEDURE — 82947 ASSAY GLUCOSE BLOOD QUANT: CPT

## 2023-04-05 RX ORDER — SEMAGLUTIDE 1.34 MG/ML
INJECTION, SOLUTION SUBCUTANEOUS
Qty: 4.5 ML | Refills: 1 | Status: SHIPPED | OUTPATIENT
Start: 2023-04-05 | End: 2023-05-17

## 2023-04-05 NOTE — PROGRESS NOTES
Patient here today for a blood draw for outside orders. She also says she need her thyroid done, no orders in chart for that. We adryan an extra tube. Please place ADD-ON orders in Epic if appropriate.    Thanks,   lab

## 2023-04-05 NOTE — PROGRESS NOTES
Medication Therapy Management (MTM) Encounter    ASSESSMENT:                            Medication Adherence/Access: No issues identified    Ear irritation: I recommended she schedule follow-up since it has not improved.    Type 2 Diabetes: Patient is not meeting A1c goal of < 7%. Self monitoring of blood glucose is not at goal of post prandial < 150 mg/dL and fasting readings are not at goal of . Patient would benefit from retitrating the Ozempic back to 0.5 mg weekly. Will continue Lantus at same dose.  Microalbumin is not at goal < 30 mg/g. Not taking an ACE-inhibitor or ARB; is indicated. She is due to have urine albumin rechecked, schedule with primary care provider next month. If urine albumin is > 30 would recommend ACE or ARB. May need to reduce amlodipine if added. Patient is not taking aspirin due to age.      Hypertension: Patient is meeting blood pressure goal of < 140/90mmHg.  Continue current medications. When urine albumin is rechecked can determine if switching to an ARB is more appropriate at that time.    PLAN:                            1. Schedule follow-up with Myesha for wellness exam and to follow-up on your ear.    2. Continue 0.25 mg once weekly of the Ozempic for 3 more weeks then increase to 0.5 mg once a week as long as you are tolerating it still. If you have questions or concerns let us know.    3. Keep checking blood sugars fasting in the morning and if can try to check 2 hours after dinner.    4. Schedule a diabetic eye exam. If the clinic you want to go to is not in network with your insurance let us know and we can put in a referral.    Follow-up: Return in 6 weeks (on 5/17/2023) for MTM office visit with Shae Ortiz.    SUBJECTIVE/OBJECTIVE:                          Linnette Monsivais is a 72 year old female coming in for a follow-up visit. Patient was accompanied by brother-in-law Sarbjit. Today's visit is a follow-up MTM visit from 11/30/22     Reason for visit: diabetes and  blood pressure follow-up.    Allergies/ADRs: Reviewed in chart  Past Medical History: Reviewed in chart  Tobacco: She reports that she has never smoked. She has never been exposed to tobacco smoke. She has never used smokeless tobacco.  Alcohol: not currently using  Personal Healthcare Goals: get blood sugar down    Specialty Providers  Psychiatrist: Dr. Valadez - seeing every 6 months    Medication Adherence/Access: Patient takes medications directly from bottles.  Patient takes medications 2 time(s) per day.   Per patient, misses medication 0 times per week. She did go without Ozempic from 22-end  due to being out of country and supply issues before leaving.  Medication barriers: none.   The patient fills medications at [facility]: YES.    Ear irritation: Patient reports that she still feels that there is something in her ear. She did use the fluticasone nasal spray but never filled the cetirizine prescribed back in Dec 2021. Unsure why she didn't fill this.    Type 2 Diabetes:  Currently taking Lantus 30 units once daily and Ozempic 0.25 mg weekly on Saturday, just restarted this last Saturday. She was on 0.5 mg at our last visit at the end of November but she left for [place] and didn't bring Ozempic with her due to supply issues. Patient reports having a headache for the last three days but nothing this morning, no other side effects.  Reports no itching of her lips when restarting Ozempic. She reports no swelling of her lips, face or throat and no shortness of breath with restarting Ozempic.    Medication history: She has taken Januvia in the past but was stopped when Ozempic was started.    Blood sugar monitorin time(s) daily. Ranges (from patient's glucose log):           From last MTM visit:              Symptoms of low blood sugar? none  Symptoms of high blood sugar? polyuria and fatigue  Eye exam: due  Foot exam: due  Diet/Exercise: Diet was different while in [place]  Aspirin: Not taking  81mg daily   Statin: Yes: rosuvastatin   ACEi/ARB: No.   Urine Albumin:   Lab Results   Component Value Date    UMALCR 52.08 (H) 01/21/2022      Lab Results   Component Value Date    A1C 8.1 04/05/2023    A1C 6.8 11/30/2022    A1C 8.6 09/19/2022    A1C 6.1 04/26/2022    A1C 7.7 01/21/2022     Last Comprehensive Metabolic Panel:  Sodium   Date Value Ref Range Status   01/21/2022 141 133 - 144 mmol/L Final     Potassium   Date Value Ref Range Status   01/21/2022 3.6 3.4 - 5.3 mmol/L Final     Chloride   Date Value Ref Range Status   01/21/2022 109 94 - 109 mmol/L Final     Carbon Dioxide (CO2)   Date Value Ref Range Status   01/21/2022 25 20 - 32 mmol/L Final     Anion Gap   Date Value Ref Range Status   01/21/2022 7 3 - 14 mmol/L Final     Glucose   Date Value Ref Range Status   01/21/2022 81 70 - 99 mg/dL Final     Urea Nitrogen   Date Value Ref Range Status   01/21/2022 25 7 - 30 mg/dL Final     Creatinine   Date Value Ref Range Status   01/21/2022 0.62 0.52 - 1.04 mg/dL Final     GFR Estimate   Date Value Ref Range Status   01/21/2022 >90 >60 mL/min/1.73m2 Final     Comment:     Effective December 21, 2021 eGFRcr in adults is calculated using the 2021 CKD-EPI creatinine equation which includes age and gender (Tank et al., NE, DOI: 10.1056/GGXLop8344465)     Calcium   Date Value Ref Range Status   01/21/2022 8.7 8.5 - 10.1 mg/dL Final     Hypertension: Current medications include amlodipine 10 mg once daily (increased at last MTM visit).  Patient does not self-monitor blood pressure.  Patient reports no current medication side effects.    BP Readings from Last 3 Encounters:   04/05/23 126/70   12/01/22 120/60   11/30/22 (!) 143/77      Pulse Readings from Last 3 Encounters:   04/05/23 79   12/01/22 82   11/30/22 80     Wt Readings from Last 3 Encounters:   04/05/23 138 lb (62.6 kg)   12/01/22 136 lb 8 oz (61.9 kg)   10/31/22 138 lb 1.6 oz (62.6 kg)     Today's Vitals: /70   Pulse 79   Wt 138 lb (62.6 kg)    SpO2 96%   BMI 29.86 kg/m    ----------------    I spent 30 minutes with this patient today. All changes were made via collaborative practice agreement with Myesha Crenshaw PA-C. A copy of the visit note was provided to the patient's provider(s).    A summary of these recommendations was given to the patient.    Shae Ortiz, PharmD  Medication Therapy Management Pharmacist     Medication Therapy Recommendations  Type 2 diabetes mellitus without complication, with long-term current use of insulin (H)    Current Medication: semaglutide (OZEMPIC, 0.25 OR 0.5 MG/DOSE,) 2 MG/1.5ML SOPN pen   Rationale: Medication product not available - Adherence - Adherence   Recommendation: Provide Education - Ozempic (0.25 or 0.5 MG/DOSE) 2 MG/1.5ML Sopn   Status: Accepted per CPA   Note: restart at 0.25 mg weekly for 4 weeks

## 2023-04-05 NOTE — PATIENT INSTRUCTIONS
"Recommendations from today's MTM visit:                                                       1. Schedule follow-up with Myesha for wellness exam and to follow-up on your ear.    2. Continue 0.25 mg once weekly of the Ozempic for 3 more weeks then increase to 0.5 mg once a week as long as you are tolerating it still. If you have questions or concerns let us know.    3. Keep checking blood sugars fasting in the morning and if can try to check 2 hours after dinner.    4. Schedule a diabetic eye exam. If the clinic you want to go to is not in network with your insurance let us know and we can put in a referral.    Follow-up: Return in 6 weeks (on 5/17/2023) for MTM office visit with Shae Ortiz.    It was great speaking with you today.  I value your experience and would be very thankful for your time in providing feedback in our clinic survey. In the next few days, you may receive an email or text message from [employer] with a link to a survey related to your  clinical pharmacist.\"     To schedule another MTM appointment, please call the clinic directly or you may call the MTM scheduling line at [phone] or toll-free at [phone].     My Clinical Pharmacist's contact information:                                                      Please feel free to contact me with any questions or concerns you have.      Shae Ortiz, PharmD  Medication Therapy Management Pharmacist  [facility] - Monday and Wednesday 7:30 - 4:00      "

## 2023-04-06 DIAGNOSIS — E03.9 ACQUIRED HYPOTHYROIDISM: Primary | ICD-10-CM

## 2023-04-06 RX ORDER — LEVOTHYROXINE SODIUM 88 UG/1
88 TABLET ORAL DAILY
Qty: 90 TABLET | Refills: 0 | Status: SHIPPED | OUTPATIENT
Start: 2023-04-06 | End: 2023-05-18

## 2023-04-06 NOTE — RESULT ENCOUNTER NOTE
Called pt via [facility]  Services   Name:  Yuli  ID: [id number]    YUDY Akbar RN, BSN  [facility]

## 2023-04-07 NOTE — RESULT ENCOUNTER NOTE
Patient and brother-in-law Sarbjit return call (consent to communicate verified). Patient declined .    Reviewed results message from provider. Patient verbalized understanding and denies additional questions or concerns at this time.    Surekha WONG RN, BSN

## 2023-05-14 DIAGNOSIS — E78.5 HYPERLIPIDEMIA LDL GOAL <130: ICD-10-CM

## 2023-05-16 RX ORDER — ROSUVASTATIN CALCIUM 5 MG/1
TABLET, COATED ORAL
Qty: 90 TABLET | Refills: 3 | Status: SHIPPED | OUTPATIENT
Start: 2023-05-16 | End: 2024-06-26

## 2023-05-17 ENCOUNTER — OFFICE VISIT (OUTPATIENT)
Dept: PHARMACY | Facility: CLINIC | Age: 73
End: 2023-05-17
Payer: COMMERCIAL

## 2023-05-17 ENCOUNTER — OFFICE VISIT (OUTPATIENT)
Dept: FAMILY MEDICINE | Facility: CLINIC | Age: 73
End: 2023-05-17
Payer: COMMERCIAL

## 2023-05-17 ENCOUNTER — LAB (OUTPATIENT)
Dept: LAB | Facility: CLINIC | Age: 73
End: 2023-05-17
Payer: COMMERCIAL

## 2023-05-17 VITALS
HEIGHT: 57 IN | TEMPERATURE: 98 F | SYSTOLIC BLOOD PRESSURE: 120 MMHG | HEART RATE: 86 BPM | RESPIRATION RATE: 18 BRPM | BODY MASS INDEX: 29.06 KG/M2 | DIASTOLIC BLOOD PRESSURE: 70 MMHG | WEIGHT: 134.7 LBS | OXYGEN SATURATION: 97 %

## 2023-05-17 DIAGNOSIS — Z79.4 TYPE 2 DIABETES MELLITUS WITHOUT COMPLICATION, WITH LONG-TERM CURRENT USE OF INSULIN (H): ICD-10-CM

## 2023-05-17 DIAGNOSIS — E11.9 TYPE 2 DIABETES MELLITUS WITHOUT COMPLICATION, WITH LONG-TERM CURRENT USE OF INSULIN (H): Primary | ICD-10-CM

## 2023-05-17 DIAGNOSIS — I10 PRIMARY HYPERTENSION: ICD-10-CM

## 2023-05-17 DIAGNOSIS — E11.9 TYPE 2 DIABETES MELLITUS WITHOUT COMPLICATION, WITH LONG-TERM CURRENT USE OF INSULIN (H): ICD-10-CM

## 2023-05-17 DIAGNOSIS — E03.9 ACQUIRED HYPOTHYROIDISM: ICD-10-CM

## 2023-05-17 DIAGNOSIS — Z00.00 ENCOUNTER FOR MEDICARE ANNUAL WELLNESS EXAM: Primary | ICD-10-CM

## 2023-05-17 DIAGNOSIS — L29.9 EAR ITCHING: ICD-10-CM

## 2023-05-17 DIAGNOSIS — E66.3 OVERWEIGHT (BMI 25.0-29.9): ICD-10-CM

## 2023-05-17 DIAGNOSIS — Z12.11 SCREEN FOR COLON CANCER: ICD-10-CM

## 2023-05-17 DIAGNOSIS — Z12.31 VISIT FOR SCREENING MAMMOGRAM: ICD-10-CM

## 2023-05-17 DIAGNOSIS — Z79.4 TYPE 2 DIABETES MELLITUS WITHOUT COMPLICATION, WITH LONG-TERM CURRENT USE OF INSULIN (H): Primary | ICD-10-CM

## 2023-05-17 DIAGNOSIS — F20.0 PARANOID SCHIZOPHRENIA, CHRONIC CONDITION WITH ACUTE EXACERBATION (H): ICD-10-CM

## 2023-05-17 PROBLEM — E66.811 CLASS 1 OBESITY DUE TO EXCESS CALORIES WITH SERIOUS COMORBIDITY AND BODY MASS INDEX (BMI) OF 31.0 TO 31.9 IN ADULT: Status: RESOLVED | Noted: 2022-09-19 | Resolved: 2023-05-17

## 2023-05-17 PROBLEM — E66.09 CLASS 1 OBESITY DUE TO EXCESS CALORIES WITH SERIOUS COMORBIDITY AND BODY MASS INDEX (BMI) OF 31.0 TO 31.9 IN ADULT: Status: RESOLVED | Noted: 2022-09-19 | Resolved: 2023-05-17

## 2023-05-17 PROBLEM — Z91.199 PERSONAL HISTORY OF NONCOMPLIANCE WITH MEDICAL TREATMENT, PRESENTING HAZARDS TO HEALTH: Status: RESOLVED | Noted: 2021-11-10 | Resolved: 2023-05-17

## 2023-05-17 LAB
ANION GAP SERPL CALCULATED.3IONS-SCNC: 12 MMOL/L (ref 7–15)
BUN SERPL-MCNC: 24.1 MG/DL (ref 8–23)
CALCIUM SERPL-MCNC: 9.4 MG/DL (ref 8.8–10.2)
CHLORIDE SERPL-SCNC: 107 MMOL/L (ref 98–107)
CREAT SERPL-MCNC: 0.67 MG/DL (ref 0.51–0.95)
DEPRECATED HCO3 PLAS-SCNC: 24 MMOL/L (ref 22–29)
GFR SERPL CREATININE-BSD FRML MDRD: >90 ML/MIN/1.73M2
GLUCOSE SERPL-MCNC: 92 MG/DL (ref 70–99)
HBA1C MFR BLD: 6.8 % (ref 0–5.6)
HOLD SPECIMEN: NORMAL
POTASSIUM SERPL-SCNC: 4.5 MMOL/L (ref 3.4–5.3)
SODIUM SERPL-SCNC: 143 MMOL/L (ref 136–145)
T4 FREE SERPL-MCNC: 1.55 NG/DL (ref 0.9–1.7)
TSH SERPL DL<=0.005 MIU/L-ACNC: 0.18 UIU/ML (ref 0.3–4.2)

## 2023-05-17 PROCEDURE — G0439 PPPS, SUBSEQ VISIT: HCPCS | Performed by: PHYSICIAN ASSISTANT

## 2023-05-17 PROCEDURE — 99207 PR NO CHARGE LOS: CPT | Performed by: PHARMACIST

## 2023-05-17 PROCEDURE — 83036 HEMOGLOBIN GLYCOSYLATED A1C: CPT

## 2023-05-17 PROCEDURE — 84443 ASSAY THYROID STIM HORMONE: CPT

## 2023-05-17 PROCEDURE — 36415 COLL VENOUS BLD VENIPUNCTURE: CPT

## 2023-05-17 PROCEDURE — 99214 OFFICE O/P EST MOD 30 MIN: CPT | Mod: 25 | Performed by: PHYSICIAN ASSISTANT

## 2023-05-17 PROCEDURE — 84439 ASSAY OF FREE THYROXINE: CPT

## 2023-05-17 PROCEDURE — 82043 UR ALBUMIN QUANTITATIVE: CPT | Performed by: PHYSICIAN ASSISTANT

## 2023-05-17 PROCEDURE — G0009 ADMIN PNEUMOCOCCAL VACCINE: HCPCS | Performed by: PHYSICIAN ASSISTANT

## 2023-05-17 PROCEDURE — 90677 PCV20 VACCINE IM: CPT | Performed by: PHYSICIAN ASSISTANT

## 2023-05-17 PROCEDURE — 80048 BASIC METABOLIC PNL TOTAL CA: CPT

## 2023-05-17 PROCEDURE — 82570 ASSAY OF URINE CREATININE: CPT | Performed by: PHYSICIAN ASSISTANT

## 2023-05-17 RX ORDER — CETIRIZINE HYDROCHLORIDE 10 MG/1
10 TABLET ORAL DAILY
Qty: 90 TABLET | Refills: 1 | Status: SHIPPED | OUTPATIENT
Start: 2023-05-17

## 2023-05-17 RX ORDER — AMLODIPINE BESYLATE 10 MG/1
10 TABLET ORAL DAILY
Qty: 90 TABLET | Refills: 3 | Status: SHIPPED | OUTPATIENT
Start: 2023-05-17 | End: 2024-06-26

## 2023-05-17 RX ORDER — SEMAGLUTIDE 1.34 MG/ML
0.5 INJECTION, SOLUTION SUBCUTANEOUS WEEKLY
Qty: 4.5 ML | Refills: 0 | Status: SHIPPED | OUTPATIENT
Start: 2023-05-17 | End: 2023-11-20 | Stop reason: DRUGHIGH

## 2023-05-17 RX ORDER — FLUTICASONE PROPIONATE 50 MCG
1 SPRAY, SUSPENSION (ML) NASAL DAILY
Qty: 16 G | Refills: 4 | Status: SHIPPED | OUTPATIENT
Start: 2023-05-17 | End: 2024-06-26

## 2023-05-17 ASSESSMENT — PAIN SCALES - GENERAL: PAINLEVEL: SEVERE PAIN (6)

## 2023-05-17 ASSESSMENT — ENCOUNTER SYMPTOMS: HYPERTENSION: 1

## 2023-05-17 NOTE — PROGRESS NOTES
{PROVIDER CHARTING PREFERENCE:126572}    John Anglin is a 72 year old, presenting for the following health issues:  Follow Up, Diabetes, Hyperlipidemia, and Hypertension        5/17/2023     9:57 AM   Additional Questions   Roomed by LOVELY Tenorio     Hyperlipidemia    Hypertension     History of Present Illness       Diabetes:   She presents for follow up of diabetes.  She is checking home blood glucose two times daily. She checks blood glucose before meals and at bedtime.  Blood glucose is never over 200 and never under 70. She is aware of hypoglycemia symptoms including weakness. She is concerned about low blood sugar, several less than 70 in the past few weeks. She is having weight gain. The patient has not had a diabetic eye exam in the last 12 months.         Hyperlipidemia:  She presents for follow up of hyperlipidemia.  She is taking medication to lower cholesterol. She is not having myalgia or other side effects to statin medications.    Hypertension: She presents for follow up of hypertension.  She does not check blood pressure  regularly outside of the clinic. Outpatient blood pressures have not been over 140/90. She follows a low salt diet.     She eats 2-3 servings of fruits and vegetables daily.She consumes 0 sweetened beverage(s) daily.She exercises with enough effort to increase her heart rate 9 or less minutes per day.  She exercises with enough effort to increase her heart rate 3 or less days per week.   She is taking medications regularly.      Currently taking Lantus 30 units once daily and Ozempic 0.5 mg weekly on Saturdays (increased after last [facility] visit on 4/29). Patient reports no side effects increasing the Ozempic dose and no itching at the injection site.     Blood sugars have been much better after she increased Ozempic dose.   AM fasting sugars usually , 111 this morning  Denies low blood sugars  She does have eye exam scheduled in June.    No other concerns  "today.    Due for AWV - added on today    Cognitive Screening   1) Repeat 3 items (Leader, Season, Table)    2) Clock draw: NORMAL  3) 3 item recall: Recalls 2 objects   Results: NORMAL clock, 1-2 items recalled: COGNITIVE IMPAIRMENT LESS LIKELY    Mini-CogTM Copyright S [employer]. Licensed by the author for use in [facility]; reprinted with permission ([email or web address]). All rights reserved.      Review of Systems   {ROS COMP (Optional):957775}      Objective    /70 (BP Location: Left arm, Patient Position: Sitting, Cuff Size: Adult Regular)   Pulse 86   Temp 98  F (36.7  C) (Oral)   Resp 18   Ht 1.448 m (4' 9\")   Wt 61.1 kg (134 lb 11.2 oz)   SpO2 97%   BMI 29.15 kg/m    Body mass index is 29.15 kg/m .  Physical Exam   {Exam List (Optional):633508}    {Diagnostic Test Results (Optional):546539}    {AMBULATORY ATTESTATION (Optional):228313}              "

## 2023-05-17 NOTE — PROGRESS NOTES
SUBJECTIVE:   Linnette is a 72 year old who presents for Preventive Visit.      5/17/2023     9:57 AM   Additional Questions   Roomed by LOVELY Tenorio     Patient has been advised of split billing requirements and indicates understanding: Yes  Are you in the first 12 months of your Medicare coverage?  No    Hyperlipidemia    Hypertension     History of Present Illness       Diabetes:   She presents for follow up of diabetes.  She is checking home blood glucose two times daily. She checks blood glucose before meals and at bedtime.  Blood glucose is never over 200 and never under 70. She is aware of hypoglycemia symptoms including weakness. She is concerned about low blood sugar, several less than 70 in the past few weeks. She is having weight gain. The patient has not had a diabetic eye exam in the last 12 months.         Hyperlipidemia:  She presents for follow up of hyperlipidemia.  She is taking medication to lower cholesterol. She is not having myalgia or other side effects to statin medications.    Hypertension: She presents for follow up of hypertension.  She does not check blood pressure  regularly outside of the clinic. Outpatient blood pressures have not been over 140/90. She follows a low salt diet.     She eats 2-3 servings of fruits and vegetables daily.She consumes 0 sweetened beverage(s) daily.She exercises with enough effort to increase her heart rate 9 or less minutes per day.  She exercises with enough effort to increase her heart rate 3 or less days per week.   Healthy Habits:     Taking medications regularly:  Yes    Medication side effects:  None    Home Safety:  No safety concerns identified    PHQ-2 Total Score: 0      Currently taking Lantus 30 units once daily and Ozempic 0.5 mg weekly on Saturdays (increased after last [facility] visit on 4/29). Patient reports no side effects increasing the Ozempic dose and no itching at the injection site.     Blood sugars have been much better after she  increased Ozempic dose.   AM fasting sugars usually , 111 this morning  Denies low blood sugars  She does have eye exam scheduled in June.    No other concerns today.      Cognitive Screening   1) Repeat 3 items (Leader, Season, Table)    2) Clock draw: NORMAL  3) 3 item recall: Recalls 2 objects   Results: NORMAL clock, 1-2 items recalled: COGNITIVE IMPAIRMENT LESS LIKELY    Mini-CogTM Copyright SIERRA Calabrese. Licensed by the author for use in [facility]; reprinted with permission (soob@[email or web address]). All rights reserved.        Have you ever done Advance Care Planning? (For example, a Health Directive, POLST, or a discussion with a medical provider or your loved ones about your wishes): Yes, patient states has an Advance Care Planning document and will bring a copy to the clinic.       Fall risk  Fallen 2 or more times in the past year?: No  Any fall with injury in the past year?: No    Cognitive Screening   1) Repeat 3 items (Leader, Season, Table)    2) Clock draw: NORMAL  3) 3 item recall: Recalls 2 objects   Results: NORMAL clock, 1-2 items recalled: COGNITIVE IMPAIRMENT LESS LIKELY    Mini-CogTM Copyright S [employer]. Licensed by the author for use in [facility]; reprinted with permission (soob@.[email or web address]). All rights reserved.      Do you have sleep apnea, excessive snoring or daytime drowsiness?: no    Reviewed and updated as needed this visit by clinical staff   Tobacco  Allergies  Meds              Reviewed and updated as needed this visit by Provider                 Social History     Tobacco Use     Smoking status: Never     Passive exposure: Never     Smokeless tobacco: Never   Vaping Use     Vaping status: Never Used     Passive vaping exposure: Yes   Substance Use Topics     Alcohol use: Not Currently             1/21/2022    10:31 AM   Alcohol Use   Prescreen: >3 drinks/day or >7 drinks/week? No     Do you have a current opioid prescription? No  Do you use any other controlled  substances or medications that are not prescribed by a provider? None        Current providers sharing in care for this patient include:   Patient Care Team:  Myesha Crenshaw PA-C as PCP - General (Family Medicine)  Myesha Crenshaw PA-C as Assigned PCP  Claudia Ortiz RPH as Pharmacist (Pharmacist)  Claudia Ortiz RPH as Assigned [facility] Pharmacist    The following health maintenance items are reviewed in Epic and correct as of today:  Health Maintenance   Topic Date Due     DTAP/TDAP/TD IMMUNIZATION (1 - Tdap) Never done     ZOSTER IMMUNIZATION (1 of 2) Never done     COVID-19 Vaccine (3 - Pfizer series) 04/14/2022     INFLUENZA VACCINE (1) Never done     MEDICARE ANNUAL WELLNESS VISIT  01/21/2023     BMP  01/21/2023     MICROALBUMIN  01/21/2023     DIABETIC FOOT EXAM  01/21/2023     COLORECTAL CANCER SCREENING  03/01/2023     EYE EXAM  04/01/2023     A1C  08/17/2023     MAMMO SCREENING  10/05/2023     LIPID  04/05/2024     TSH W/FREE T4 REFLEX  04/05/2024     ANNUAL REVIEW OF HM ORDERS  05/17/2024     FALL RISK ASSESSMENT  05/17/2024     ADVANCE CARE PLANNING  01/21/2027     DEXA  09/03/2034     HEPATITIS C SCREENING  Completed     PHQ-2 (once per calendar year)  Completed     Pneumococcal Vaccine: 65+ Years  Completed     IPV IMMUNIZATION  Aged Out     MENINGITIS IMMUNIZATION  Aged Out     Lab work is in process  Labs reviewed in EPIC  BP Readings from Last 3 Encounters:   05/17/23 120/70   04/05/23 126/70   12/01/22 120/60    Wt Readings from Last 3 Encounters:   05/17/23 61.1 kg (134 lb 11.2 oz)   04/05/23 62.6 kg (138 lb)   12/01/22 61.9 kg (136 lb 8 oz)                  Patient Active Problem List   Diagnosis     Psychosis (H)     Paranoid schizophrenia, chronic condition with acute exacerbation (H)     Type 2 diabetes mellitus without complication, with long-term current use of insulin (H)     Acquired hypothyroidism     Hyperlipidemia LDL goal <130     Personal history of noncompliance with medical  treatment, presenting hazards to health     Class 1 obesity due to excess calories with serious comorbidity and body mass index (BMI) of 31.0 to 31.9 in adult     No past surgical history on file.    Social History     Tobacco Use     Smoking status: Never     Passive exposure: Never     Smokeless tobacco: Never   Vaping Use     Vaping status: Never Used     Passive vaping exposure: Yes   Substance Use Topics     Alcohol use: Not Currently     No family history on file.      Current Outpatient Medications   Medication Sig Dispense Refill     Alcohol Swabs (ALCOHOL PREP) 70 % PADS        amLODIPine (NORVASC) 10 MG tablet Take 1 tablet (10 mg) by mouth daily 90 tablet 3     blood glucose (NO BRAND SPECIFIED) lancets standard Use to test blood sugar  2 times daily as directed. To accompany glucose monitor brands per insurance coverage. 100 each 11     blood glucose (NO BRAND SPECIFIED) test strip Use to test blood sugar 2 times daily 200 strip 3     blood glucose monitoring (NO BRAND SPECIFIED) meter device kit Check blood glucose 2 times daily. 1 kit 0     Calcium Carb-Cholecalciferol (CALCIUM 600+D3) 600-800 MG-UNIT TABS Take 1 tablet by mouth 2 times daily (Patient not taking: Reported on 5/17/2023) 60 tablet 11     cetirizine (ZYRTEC) 10 MG tablet Take 1 tablet (10 mg) by mouth daily 90 tablet 1     fluticasone (FLONASE) 50 MCG/ACT nasal spray Spray 1 spray into both nostrils daily (Patient not taking: Reported on 5/17/2023) 16 g 4     insulin glargine (LANTUS PEN) 100 UNIT/ML pen Inject 30 Units Subcutaneous At Bedtime 45 mL 0     insulin pen needle (32G X 4 MM) 32G X 4 MM miscellaneous Single Use. 100 each 0     Lancets (ONETOUCH DELICA PLUS [id number]) MISC        levothyroxine (SYNTHROID/LEVOTHROID) 88 MCG tablet Take 1 tablet (88 mcg) by mouth daily 90 tablet 0     OLANZapine zydis (ZYPREXA) 5 MG ODT Place 1 tablet under the tongue daily       rosuvastatin (CRESTOR) 5 MG tablet TAKE ONE TABLET BY MOUTH DAILY 90  "tablet 3     semaglutide (OZEMPIC, 0.25 OR 0.5 MG/DOSE,) 2 MG/1.5ML SOPN pen Inject 0.5 mg Subcutaneous once a week 4.5 mL 0     No Known Allergies  Recent Labs   Lab Test 05/17/23  0958 05/17/23  0957 04/05/23  0911 11/30/22  1004 09/19/22  0922 04/26/22  1347 03/09/22  1021 01/21/22  1142 11/10/21  0954   A1C 6.8*  --  8.1* 6.8*   < > 6.1*  --  7.7* 9.3*   LDL  --   --  87  --   --  46  --   --  80   HDL  --   --  45*  --   --  49*  --   --  78   TRIG  --   --  138  --   --  144  --   --  82   ALT  --   --  31  --   --   --   --  37 20   CR  --  0.67 0.63  --   --   --   --  0.62 0.74   GFRESTIMATED  --  >90 >90  --   --   --   --  >90 82   POTASSIUM  --  4.5  --   --   --   --   --  3.6 3.9   TSH  --   --  7.46*  --   --  2.50   < > 69.89* 69.88*    < > = values in this interval not displayed.      Mammogram Screening: Mammogram Screening: Recommended mammography every 1-2 years with patient discussion and risk factor consideration        Review of Systems  Constitutional, HEENT, cardiovascular, pulmonary, GI, , musculoskeletal, neuro, skin, endocrine and psych systems are negative, except as otherwise noted.    OBJECTIVE:   /70 (BP Location: Left arm, Patient Position: Sitting, Cuff Size: Adult Regular)   Pulse 86   Temp 98  F (36.7  C) (Oral)   Resp 18   Ht 1.448 m (4' 9\")   Wt 61.1 kg (134 lb 11.2 oz)   SpO2 97%   BMI 29.15 kg/m   Estimated body mass index is 29.15 kg/m  as calculated from the following:    Height as of this encounter: 1.448 m (4' 9\").    Weight as of this encounter: 61.1 kg (134 lb 11.2 oz).  Physical Exam  GENERAL: healthy, alert and no distress  EYES: Eyes grossly normal to inspection, PERRL and conjunctivae and sclerae normal  HENT: ear canals and TM's normal, nose and mouth without ulcers or lesions  NECK: no adenopathy, no asymmetry, masses, or scars and thyroid normal to palpation  RESP: lungs clear to auscultation - no rales, rhonchi or wheezes  CV: regular rate and " rhythm, normal S1 S2, no S3 or S4, no murmur, click or rub, no peripheral edema and peripheral pulses strong  ABDOMEN: soft, nontender, no hepatosplenomegaly, no masses and bowel sounds normal  MS: no gross musculoskeletal defects noted, no edema  SKIN: no suspicious lesions or rashes  NEURO: Normal strength and tone, mentation intact and speech normal  PSYCH: mentation appears normal, affect normal/bright  Diabetic foot exam: normal DP and PT pulses, no trophic changes or ulcerative lesions, normal sensory exam and normal monofilament exam    Diagnostic Test Results:  Labs reviewed in Epic    ASSESSMENT / PLAN:   Encounter for Medicare annual wellness exam  Reviewed personal and family history. Reviewed age appropriate screenings. Reviewed healthy BP and BMI ranges. Counseled on lifestyle modifications for optimal mental and physical health.  Discussed age-appropriate health maintenance. Recommended any needed vaccinations. Continue to focus on well balanced diet and exercise. Recommend Tdap and Shingles at pharmacy. Declines COVID booster.    Type 2 diabetes mellitus without complication, with long-term current use of insulin (H)  Improved, doing well. Continue present management. She also has visit with [facility] pharmacist today. Eye exam scheduled for June.  - BASIC METABOLIC PANEL; Future  - Albumin Random Urine Quantitative with Creat Ratio; Future  - Hemoglobin A1c; Future  - semaglutide (OZEMPIC, 0.25 OR 0.5 MG/DOSE,) 2 MG/1.5ML SOPN pen; Inject 0.5 mg Subcutaneous once a week  - insulin glargine (LANTUS PEN) 100 UNIT/ML pen; Inject 30 Units Subcutaneous At Bedtime  - Albumin Random Urine Quantitative with Creat Ratio    Screen for colon cancer  Reports she had this done in 2021 in [place], and reports it was normal. She does not have a way to get records for us.    Primary hypertension  BP well controlled. We are awaiting urine albumin results and would consider switching to ACE or ARB if elevated.  -  amLODIPine (NORVASC) 10 MG tablet; Take 1 tablet (10 mg) by mouth daily    Ear itching  Has been using cetirizine and flonase, working well, would like refills.  - cetirizine (ZYRTEC) 10 MG tablet; Take 1 tablet (10 mg) by mouth daily  - fluticasone (FLONASE) 50 MCG/ACT nasal spray; Spray 1 spray into both nostrils daily (Patient not taking: Reported on 5/17/2023)    Visit for screening mammogram  - *MA Screening Digital Bilateral; Future    Paranoid schizophrenia, chronic condition with acute exacerbation (H)  Chronic, reports stable. Follows with psychiatry.    Overweight (BMI 25.0-29.9)  Discussed healthy diet/exercise.    Acquired hypothyroidism  Chronic, clinically euthyroid. Labs today and will send in med refills pending results.       COUNSELING:  Reviewed preventive health counseling, as reflected in patient instructions        She reports that she has never smoked. She has never been exposed to tobacco smoke. She has never used smokeless tobacco.      Appropriate preventive services were discussed with this patient, including applicable screening as appropriate for cardiovascular disease, diabetes, osteopenia/osteoporosis, and glaucoma.  As appropriate for age/gender, discussed screening for colorectal cancer, prostate cancer, breast cancer, and cervical cancer. Checklist reviewing preventive services available has been given to the patient.    Reviewed patients plan of care and provided an AVS. The Basic Care Plan (routine screening as documented in Health Maintenance) for Linnette meets the Care Plan requirement. This Care Plan has been established and reviewed with the Patient.          Myesha Crenshaw PA-C  [facility]    Identified Health Risks:    I have reviewed Opioid Use Disorder and Substance Use Disorder risk factors and made any needed referrals.

## 2023-05-17 NOTE — PROGRESS NOTES
Medication Therapy Management (MTM) Encounter    ASSESSMENT:                            Medication Adherence/Access: No issues identified    Type 2 Diabetes: Patient is meeting A1c goal of < 7%. Self monitoring of blood glucose is at goal post prandial < 150 mg/dL and fasting readings . Patient would benefit from no changes today.  We did discuss increasing Ozempic and decreasing Lantus but she would like to continue current regimen for now.  Microalbumin is not at goal < 30 mg/g, rechecked today. Not taking an ACE-inhibitor or ARB; is indicated. If urine albumin is > 30 would recommend ACE or ARB. May need to reduce amlodipine if added. Patient is not taking aspirin due to age.      PLAN:                            1. No changes with your medicines today.    2. Call the mail order pharmacy ([phone]) when you have two weeks left of your Ozempic and ask them to mail it to your [place] address. They will need a credit card to ship it.     3. Schedule 6 month follow on same day as your 6 mo follow-up with Myesha.    Follow-up: Return in about 6 months (around 11/17/2023).    SUBJECTIVE/OBJECTIVE:                          Linnette Monsivais is a 72 year old female coming in for a follow-up visit. Patient was accompanied by her brother-in-law Sarbjit. Today's visit is a follow-up MTM visit from 4/5/23     Reason for visit: diabetes follow-up.    Allergies/ADRs: Reviewed in chart  Past Medical History: Reviewed in chart  Tobacco: She reports that she has never smoked. She has never been exposed to tobacco smoke. She has never used smokeless tobacco.  Alcohol: not currently using  Personal Healthcare Goals: get blood sugar down    Specialty Providers  Psychiatrist: Dr. aVladez - seeing every 6 months    Medication Adherence/Access: Patient takes medications directly from bottles.  Patient takes medications 2 time(s) per day.   Per patient, misses medication 0 times per week.   Medication barriers: none.   The  patient fills medications at [place]: YES.    Ear irritation: Patient reports that she still feels that there is something in her ear. She did use the fluticasone nasal spray but never filled the cetirizine prescribed back in Dec 2021. Unsure why she didn't fill this.    Type 2 Diabetes:  Currently taking Lantus 30 units once daily and Ozempic 0.5 mg weekly on  (increased after last MTM visit on ).  Patient reports no side effects increasing the Ozempic dose and no itching at the injection site.     Medication history: She has taken Januvia in the past but was stopped when Ozempic was started.    Blood sugar monitorin time(s) daily. Ranges (from patient's glucose log):             From last MTM visit:        Symptoms of low blood sugar? none  Symptoms of high blood sugar? None reporte  Eye exam: due - scheduled for   Foot exam: due  Diet/Exercise: Diet was different while in [place]  Aspirin: Not taking 81mg daily   Statin: Yes: rosuvastatin   ACEi/ARB: No.   Urine Albumin:   Lab Results   Component Value Date    UMALCR 52.08 (H) 2022     Lab Results   Component Value Date    A1C 6.8 2023    A1C 8.1 2023    A1C 6.8 2022    A1C 8.6 2022    A1C 6.1 2022     Last Comprehensive Metabolic Panel:  Sodium   Date Value Ref Range Status   2022 141 133 - 144 mmol/L Final     Potassium   Date Value Ref Range Status   2022 3.6 3.4 - 5.3 mmol/L Final     Chloride   Date Value Ref Range Status   2022 109 94 - 109 mmol/L Final     Carbon Dioxide (CO2)   Date Value Ref Range Status   2022 25 20 - 32 mmol/L Final     Anion Gap   Date Value Ref Range Status   2022 7 3 - 14 mmol/L Final     Glucose   Date Value Ref Range Status   2023 185 (H) 70 - 99 mg/dL Final   2022 81 70 - 99 mg/dL Final     Urea Nitrogen   Date Value Ref Range Status   2023 24.0 (H) 8.0 - 23.0 mg/dL Final   2022 25 7 - 30 mg/dL Final     Creatinine   Date  Value Ref Range Status   04/05/2023 0.63 0.51 - 0.95 mg/dL Final     GFR Estimate   Date Value Ref Range Status   04/05/2023 >90 >60 mL/min/1.73m2 Final     Comment:     eGFR calculated using 2021 CKD-EPI equation.     Calcium   Date Value Ref Range Status   01/21/2022 8.7 8.5 - 10.1 mg/dL Final     Bilirubin Total   Date Value Ref Range Status   01/21/2022 0.6 0.2 - 1.3 mg/dL Final     Alkaline Phosphatase   Date Value Ref Range Status   01/21/2022 97 40 - 150 U/L Final     ALT   Date Value Ref Range Status   04/05/2023 31 10 - 35 U/L Final     AST   Date Value Ref Range Status   04/05/2023 20 10 - 35 U/L Final       Hypertension: Current medications include amlodipine 10 mg once daily (increased at last MTM visit).  Patient does not self-monitor blood pressure.  Patient reports no current medication side effects.    BP Readings from Last 3 Encounters:   05/17/23 120/70   04/05/23 126/70   12/01/22 120/60      Pulse Readings from Last 3 Encounters:   05/17/23 86   04/05/23 79   12/01/22 82     Wt Readings from Last 3 Encounters:   05/17/23 134 lb 11.2 oz (61.1 kg)   04/05/23 138 lb (62.6 kg)   12/01/22 136 lb 8 oz (61.9 kg)     Today's Vitals: There were no vitals taken for this visit.  ----------------    I spent 20 minutes with this patient today. A copy of the visit note was provided to the patient's provider(s).    A summary of these recommendations was given to the patient.    Shae Ortiz PharmD  Medication Therapy Management Pharmacist     Medication Therapy Recommendations  No medication therapy recommendations to display

## 2023-05-17 NOTE — PATIENT INSTRUCTIONS
"Recommendations from today's MTM visit:                                                       1. No changes with your medicines today.    2. Call the mail order pharmacy ([phone]) when you have two weeks left of your Ozempic and ask them to mail it to your [place] address. They will need a credit card to ship it.     3. Schedule 6 month follow on same day as your 6 mo follow-up with Myesha.    Follow-up: Return in about 6 months (around 11/17/2023).    It was great speaking with you today.  I value your experience and would be very thankful for your time in providing feedback in our clinic survey. In the next few days, you may receive an email or text message from [employer] with a link to a survey related to your  clinical pharmacist.\"     To schedule another MTM appointment, please call the clinic directly or you may call the MTM scheduling line at [phone] or toll-free at [phone].     My Clinical Pharmacist's contact information:                                                      Please feel free to contact me with any questions or concerns you have.      Shae Ortiz, PharmD  Medication Therapy Management Pharmacist  [facility] - Monday and Wednesday 7:30 - 4:00      "

## 2023-05-17 NOTE — PATIENT INSTRUCTIONS
Patient Education   Personalized Prevention Plan  You are due for the preventive services outlined below.  Your care team is available to assist you in scheduling these services.  If you have already completed any of these items, please share that information with your care team to update in your medical record.  Health Maintenance Due   Topic Date Due     Diptheria Tetanus Pertussis (DTAP/TDAP/TD) Vaccine (1 - Tdap) Never done     Zoster (Shingles) Vaccine (1 of 2) Never done     COVID-19 Vaccine (3 - Pfizer series) 04/14/2022     Flu Vaccine (1) Never done     Annual Wellness Visit  01/21/2023     Basic Metabolic Panel  01/21/2023     Kidney Microalbumin Urine Test  01/21/2023     Diabetic Foot Exam  01/21/2023     Colorectal Cancer Screening  03/01/2023     Eye Exam  04/01/2023

## 2023-05-18 DIAGNOSIS — E03.9 ACQUIRED HYPOTHYROIDISM: ICD-10-CM

## 2023-05-18 LAB
CREAT UR-MCNC: 121 MG/DL
MICROALBUMIN UR-MCNC: 19.2 MG/L
MICROALBUMIN/CREAT UR: 15.87 MG/G CR (ref 0–25)

## 2023-05-18 RX ORDER — LEVOTHYROXINE SODIUM 88 UG/1
TABLET ORAL
Qty: 90 TABLET | Refills: 0 | Status: SHIPPED | OUTPATIENT
Start: 2023-05-18 | End: 2023-06-16

## 2023-05-23 DIAGNOSIS — E11.9 TYPE 2 DIABETES MELLITUS WITHOUT COMPLICATION, WITH LONG-TERM CURRENT USE OF INSULIN (H): ICD-10-CM

## 2023-05-23 DIAGNOSIS — Z79.4 TYPE 2 DIABETES MELLITUS WITHOUT COMPLICATION, WITH LONG-TERM CURRENT USE OF INSULIN (H): ICD-10-CM

## 2023-05-24 RX ORDER — SEMAGLUTIDE 1.34 MG/ML
INJECTION, SOLUTION SUBCUTANEOUS
Qty: 4.5 ML | Refills: 1 | OUTPATIENT
Start: 2023-05-24

## 2023-05-24 NOTE — TELEPHONE ENCOUNTER
Denied ozempic 0.25 or 0.5 mg   Duplicate ordered 5/17/23 4.5 ml, 0  [facility] RM     Linnette QUAN RN

## 2023-06-15 ENCOUNTER — LAB (OUTPATIENT)
Dept: LAB | Facility: CLINIC | Age: 73
End: 2023-06-15
Payer: COMMERCIAL

## 2023-06-15 ENCOUNTER — TELEPHONE (OUTPATIENT)
Dept: FAMILY MEDICINE | Facility: CLINIC | Age: 73
End: 2023-06-15

## 2023-06-15 DIAGNOSIS — E03.9 ACQUIRED HYPOTHYROIDISM: ICD-10-CM

## 2023-06-15 LAB — TSH SERPL DL<=0.005 MIU/L-ACNC: 2.82 UIU/ML (ref 0.3–4.2)

## 2023-06-15 PROCEDURE — 36415 COLL VENOUS BLD VENIPUNCTURE: CPT

## 2023-06-15 PROCEDURE — 84443 ASSAY THYROID STIM HORMONE: CPT

## 2023-06-15 NOTE — TELEPHONE ENCOUNTER
Patient is questioning dose? Should patient say on Levothyroxine 88?? She is leaving the country today. If someone could look into this.    Thank you.  Erin Menezes CPhT  [facility]  [address] [place] [address] [phone]

## 2023-06-15 NOTE — TELEPHONE ENCOUNTER
Called pt, she is leaving Friday 6/16 at 3pm, will be gone until September.  Scheduled STAT lab for 1pm.      Routed to Myesha Crenshaw to update and watch for results and advise on dose.    Brenda Akbar RN, BSN  [facility]

## 2023-06-15 NOTE — TELEPHONE ENCOUNTER
LMTCB.     Myesha Crenshaw PA-C   5/18/2023 12:03 PM CDT       Please call with results.     Metabolic panel shows normal kidney function and electrolytes.     Your microalbumin screening (check for protein in the urine) is normal. This test is used to detect early signs of kidney damage in patients with an increased risk of kidney disease, such as those with diabetes or high blood pressure.     Thyroid test shows that dose of levothyroxine is a little too high. Let's plan to have you continue with the 88 mcg dose but only take 6 days per week instead of 7 days per week. We should plan to recheck your thyroid lab again in 6 weeks and I put in lab orders for this.      Thanks!  DANAE Panda out of office.  Huddled with RAUL, Jose Antonio Sánchez.  Ordered STAT TSH lab, advise pt to come in for draw to get correct dose.  If pt unable, AP can send in previous dose as mentioned above to get her by until she returns from [place] of [place].    Brenda Akbar RN, BSN  [facility]

## 2023-06-16 RX ORDER — LEVOTHYROXINE SODIUM 88 UG/1
TABLET ORAL
Qty: 90 TABLET | Refills: 3 | Status: SHIPPED | OUTPATIENT
Start: 2023-06-16 | End: 2024-06-26

## 2023-06-16 NOTE — TELEPHONE ENCOUNTER
Please call.    Thyroid test is normal. Continue to take levothyroxine 88  mcg 6 days per week instead of 7 days per week. I sent in refills.    Thanks!  Myesha Crenshaw PA-C

## 2023-06-16 NOTE — TELEPHONE ENCOUNTER
Patient calls along with brother-in-law, Sarbjit. Declines .    Notified of provider message. Patient was given an opportunity to ask questions, verbalized understanding of plan, and is agreeable.     Zaida Metz RN

## 2023-06-19 ENCOUNTER — TRANSFERRED RECORDS (OUTPATIENT)
Dept: MULTI SPECIALTY CLINIC | Facility: CLINIC | Age: 73
End: 2023-06-19

## 2023-06-19 LAB — RETINOPATHY: NORMAL

## 2023-09-05 ENCOUNTER — PATIENT OUTREACH (OUTPATIENT)
Dept: CARE COORDINATION | Facility: CLINIC | Age: 73
End: 2023-09-05
Payer: COMMERCIAL

## 2023-09-11 ENCOUNTER — TELEPHONE (OUTPATIENT)
Dept: FAMILY MEDICINE | Facility: CLINIC | Age: 73
End: 2023-09-11
Payer: COMMERCIAL

## 2023-09-25 ENCOUNTER — ANCILLARY PROCEDURE (OUTPATIENT)
Dept: MAMMOGRAPHY | Facility: CLINIC | Age: 73
End: 2023-09-25
Attending: PHYSICIAN ASSISTANT
Payer: COMMERCIAL

## 2023-09-25 DIAGNOSIS — Z12.31 VISIT FOR SCREENING MAMMOGRAM: ICD-10-CM

## 2023-09-25 PROCEDURE — 77067 SCR MAMMO BI INCL CAD: CPT | Mod: TC | Performed by: RADIOLOGY

## 2023-09-25 PROCEDURE — 77063 BREAST TOMOSYNTHESIS BI: CPT | Mod: TC | Performed by: RADIOLOGY

## 2023-09-26 DIAGNOSIS — E11.9 TYPE 2 DIABETES MELLITUS WITHOUT COMPLICATION, WITH LONG-TERM CURRENT USE OF INSULIN (H): ICD-10-CM

## 2023-09-26 DIAGNOSIS — Z79.4 TYPE 2 DIABETES MELLITUS WITHOUT COMPLICATION, WITH LONG-TERM CURRENT USE OF INSULIN (H): ICD-10-CM

## 2023-09-26 RX ORDER — INSULIN GLARGINE 100 [IU]/ML
INJECTION, SOLUTION SUBCUTANEOUS
Qty: 45 ML | Refills: 0 | Status: SHIPPED | OUTPATIENT
Start: 2023-09-26 | End: 2023-11-20

## 2023-10-03 ENCOUNTER — PATIENT OUTREACH (OUTPATIENT)
Dept: CARE COORDINATION | Facility: CLINIC | Age: 73
End: 2023-10-03
Payer: COMMERCIAL

## 2023-11-14 ENCOUNTER — TRANSFERRED RECORDS (OUTPATIENT)
Dept: HEALTH INFORMATION MANAGEMENT | Facility: CLINIC | Age: 73
End: 2023-11-14
Payer: COMMERCIAL

## 2023-11-16 DIAGNOSIS — F20.0 PARANOID SCHIZOPHRENIA, CHRONIC CONDITION (H): Primary | ICD-10-CM

## 2023-11-16 DIAGNOSIS — Z79.899 HIGH RISK MEDICATION USE: ICD-10-CM

## 2023-11-20 ENCOUNTER — OFFICE VISIT (OUTPATIENT)
Dept: FAMILY MEDICINE | Facility: CLINIC | Age: 73
End: 2023-11-20
Payer: COMMERCIAL

## 2023-11-20 ENCOUNTER — OFFICE VISIT (OUTPATIENT)
Dept: PHARMACY | Facility: CLINIC | Age: 73
End: 2023-11-20
Payer: COMMERCIAL

## 2023-11-20 VITALS
DIASTOLIC BLOOD PRESSURE: 54 MMHG | OXYGEN SATURATION: 96 % | TEMPERATURE: 97.7 F | WEIGHT: 135.1 LBS | BODY MASS INDEX: 29.15 KG/M2 | HEART RATE: 74 BPM | RESPIRATION RATE: 14 BRPM | HEIGHT: 57 IN | SYSTOLIC BLOOD PRESSURE: 110 MMHG

## 2023-11-20 DIAGNOSIS — Z12.11 SCREEN FOR COLON CANCER: ICD-10-CM

## 2023-11-20 DIAGNOSIS — Z79.899 HIGH RISK MEDICATION USE: ICD-10-CM

## 2023-11-20 DIAGNOSIS — F20.0 PARANOID SCHIZOPHRENIA, CHRONIC CONDITION (H): ICD-10-CM

## 2023-11-20 DIAGNOSIS — Z79.4 TYPE 2 DIABETES MELLITUS WITHOUT COMPLICATION, WITH LONG-TERM CURRENT USE OF INSULIN (H): Primary | ICD-10-CM

## 2023-11-20 DIAGNOSIS — E11.9 TYPE 2 DIABETES MELLITUS WITHOUT COMPLICATION, WITH LONG-TERM CURRENT USE OF INSULIN (H): Primary | ICD-10-CM

## 2023-11-20 DIAGNOSIS — E78.5 HYPERLIPIDEMIA LDL GOAL <130: ICD-10-CM

## 2023-11-20 DIAGNOSIS — M85.80 OSTEOPENIA: ICD-10-CM

## 2023-11-20 DIAGNOSIS — I10 PRIMARY HYPERTENSION: ICD-10-CM

## 2023-11-20 LAB
ALBUMIN SERPL BCG-MCNC: 4.2 G/DL (ref 3.5–5.2)
ALP SERPL-CCNC: 105 U/L (ref 40–150)
ALT SERPL W P-5'-P-CCNC: 15 U/L (ref 0–50)
ANION GAP SERPL CALCULATED.3IONS-SCNC: 10 MMOL/L (ref 7–15)
AST SERPL W P-5'-P-CCNC: 19 U/L (ref 0–45)
BILIRUB SERPL-MCNC: 0.7 MG/DL
BUN SERPL-MCNC: 13.3 MG/DL (ref 8–23)
CALCIUM SERPL-MCNC: 9.3 MG/DL (ref 8.8–10.2)
CHLORIDE SERPL-SCNC: 105 MMOL/L (ref 98–107)
CHOLEST SERPL-MCNC: 128 MG/DL
CREAT SERPL-MCNC: 0.65 MG/DL (ref 0.51–0.95)
DEPRECATED HCO3 PLAS-SCNC: 26 MMOL/L (ref 22–29)
EGFRCR SERPLBLD CKD-EPI 2021: >90 ML/MIN/1.73M2
FASTING STATUS PATIENT QL REPORTED: YES
GLUCOSE SERPL-MCNC: 85 MG/DL (ref 70–99)
GLUCOSE SERPL-MCNC: 85 MG/DL (ref 70–99)
HBA1C MFR BLD: 6.2 % (ref 0–5.6)
HDLC SERPL-MCNC: 48 MG/DL
LDLC SERPL CALC-MCNC: 49 MG/DL
NONHDLC SERPL-MCNC: 80 MG/DL
POTASSIUM SERPL-SCNC: 4.2 MMOL/L (ref 3.4–5.3)
PROT SERPL-MCNC: 7.8 G/DL (ref 6.4–8.3)
SODIUM SERPL-SCNC: 141 MMOL/L (ref 135–145)
TRIGL SERPL-MCNC: 154 MG/DL

## 2023-11-20 PROCEDURE — 36415 COLL VENOUS BLD VENIPUNCTURE: CPT | Performed by: PHYSICIAN ASSISTANT

## 2023-11-20 PROCEDURE — 83036 HEMOGLOBIN GLYCOSYLATED A1C: CPT | Performed by: PHYSICIAN ASSISTANT

## 2023-11-20 PROCEDURE — 80053 COMPREHEN METABOLIC PANEL: CPT | Performed by: PHYSICIAN ASSISTANT

## 2023-11-20 PROCEDURE — 80061 LIPID PANEL: CPT | Performed by: PHYSICIAN ASSISTANT

## 2023-11-20 PROCEDURE — 99207 PR NO CHARGE LOS: CPT | Performed by: PHARMACIST

## 2023-11-20 PROCEDURE — 99214 OFFICE O/P EST MOD 30 MIN: CPT | Performed by: PHYSICIAN ASSISTANT

## 2023-11-20 RX ORDER — INSULIN GLARGINE 100 [IU]/ML
24 INJECTION, SOLUTION SUBCUTANEOUS DAILY
Qty: 45 ML | Refills: 0 | Status: SHIPPED | OUTPATIENT
Start: 2023-11-20 | End: 2024-06-26

## 2023-11-20 ASSESSMENT — PAIN SCALES - GENERAL: PAINLEVEL: NO PAIN (0)

## 2023-11-20 NOTE — PROGRESS NOTES
Assessment & Plan     Type 2 diabetes mellitus without complication, with long-term current use of insulin (H)  Chronic, improved since last checked, A1c 6.2 today. Currently taking Lantus 28 units nightly, Ozempic 0.5 mg once weekly. She denies medication side effects or concerns. No concerns with low blood sugar. She would be open to increasing ozempic and decreasing insulin. She has visit with MTM pharmacist today after our visit and they will review blood sugar readings in more detail. She is having her sister bring her meter to clinic because she forgot to bring it with her.     Paranoid schizophrenia, chronic condition (H)  Per psychiatrist  - Glucose  - Hemoglobin A1c  - Lipid panel reflex to direct LDL Fasting  - Comprehensive metabolic panel    High risk medication use  Per psychiatrist  - Glucose  - Hemoglobin A1c  - Lipid panel reflex to direct LDL Fasting  - Comprehensive metabolic panel    Screen for colon cancer  - Colonoscopy Screening  Referral; Future      DANAE [facility] JACOB Anglin is a 73 year old, presenting for the following health issues:  Recheck Medication        11/20/2023     9:08 AM   Additional Questions   Roomed by MR   Accompanied by MACK         11/20/2023     9:08 AM   Patient Reported Additional Medications   Patient reports taking the following new medications NA       HPI       Diabetes Follow-up    How often are you checking your blood sugar? Two times daily  What time of day are you checking your blood sugars (select all that apply)?   Before and after meals   Have you had any blood sugars above 200?  No  Have you had any blood sugars below 70?  No  What symptoms do you notice when your blood sugar is low?  None  What concerns do you have today about your diabetes? None   Do you have any of these symptoms? (Select all that apply)  No numbness or tingling in feet.  No redness, sores or blisters on feet.  No  complaints of excessive thirst.  No reports of blurry vision.  No significant changes to weight.  Have you had a diabetic eye exam in the last 12 months? Yes- Date of last eye exam: unknown,  Location: [place]    Lantus 28 units nightly  Ozempic 0.5 mg once weekly  She denies medication side effects or concerns.    Blood sugars <200  Lowest fasting blood sugar 79  No concerns with low blood sugar  She would be open to increasing ozempic and decreasing insulin  She has visit with [facility] pharmacist today after our visit and they will review blood sugar readings in more detail. She is having her sister bring her meter to clinic because she forgot to bring it with her.    She was on vacation in [place] and had eye exam in June 2023  Franci Macias MD, ophthalmology, Cornea and External Disease  [facility]  [address] [place] [address]  Phone: [phone]  Fax: [phone]  DANNIE completed today    Going to [place] in December    Hyperlipidemia Follow-Up    Are you regularly taking any medication or supplement to lower your cholesterol?   Yes- Rosuvastatin   Are you having muscle aches or other side effects that you think could be caused by your cholesterol lowering medication?  No    Hypertension Follow-up    Do you check your blood pressure regularly outside of the clinic? NO but she she had a person come out to her house for insurance and they did her BP  Reading was 106/52   Are you following a low salt diet? No  Are your blood pressures ever more than 140 on the top number (systolic) OR more   than 90 on the bottom number (diastolic), for example 140/90? No    BP Readings from Last 2 Encounters:   11/20/23 110/54   05/17/23 120/70     Hemoglobin A1C (%)   Date Value   11/20/2023 6.2 (H)   05/17/2023 6.8 (H)     LDL Cholesterol Calculated (mg/dL)   Date Value   04/05/2023 87   04/26/2022 46         Hypothyroidism Follow-up    Since last visit, patient describes the following symptoms:  "Weight stable, no hair loss, no skin changes, no constipation, no loose stools      Review of Systems   Constitutional, HEENT, cardiovascular, pulmonary, gi and gu systems are negative, except as otherwise noted.      Objective    /54 (BP Location: Right arm, Patient Position: Sitting, Cuff Size: Adult Regular)   Pulse 74   Temp 97.7  F (36.5  C) (Oral)   Resp 14   Ht 1.448 m (4' 9\")   Wt 61.3 kg (135 lb 1.6 oz)   SpO2 96%   BMI 29.24 kg/m    Body mass index is 29.24 kg/m .  Physical Exam   GENERAL: healthy, alert and no distress  NECK: no adenopathy, no asymmetry, masses, or scars and thyroid normal to palpation  RESP: lungs clear to auscultation - no rales, rhonchi or wheezes  CV: regular rate and rhythm, normal S1 S2, no S3 or S4, no murmur, click or rub, no peripheral edema  NEURO: Normal strength and tone, mentation intact and speech normal  PSYCH: mentation appears normal, affect normal/bright    Results for orders placed or performed in visit on 11/20/23   Hemoglobin A1c     Status: Abnormal   Result Value Ref Range    Hemoglobin A1C 6.2 (H) 0.0 - 5.6 %           "

## 2023-11-20 NOTE — PROGRESS NOTES
Medication Therapy Management (MTM) Encounter    ASSESSMENT:                            Medication Adherence/Access: {adherencechoices:461365}    ***:   ***    PLAN:                            ***    Follow-up: {followuptest2:456671}    SUBJECTIVE/OBJECTIVE:                          Linnette Monsivais is a 73 year old female { :864397} for {mtmvisit:627014}     Reason for visit: ***.    Allergies/ADRs: {1/2/3/4/5:038234}  Past Medical History: {1/2/3/4/5:470329}  Tobacco: She reports that she has never smoked. She has never been exposed to tobacco smoke. She has never used smokeless tobacco.  Alcohol: {ALCOHOL CONSUMPTION HX:420414}  {Social and Goals:739084}  Medication Adherence/Access: {fumedadherence:288795}      Type 2 Diabetes:  Currently taking Lantus 30 units once daily and Ozempic 0.5 mg weekly on  (increased after last MTM visit on ).  Patient reports no side effects increasing the Ozempic dose and no itching at the injection site.     Medication history: She has taken Januvia in the past but was stopped when Ozempic was started.    Blood sugar monitorin time(s) daily. Ranges (from patient's glucose log):             From last MTM visit:        Symptoms of low blood sugar? none  Symptoms of high blood sugar? None reporte  Eye exam: due - scheduled for   Foot exam: due  Diet/Exercise: Diet was different while in [place]  Aspirin: Not taking 81mg daily   Statin: Yes: rosuvastatin   ACEi/ARB: No.   Urine Albumin:   Lab Results   Component Value Date    UMALCR 52.08 (H) 2022     Lab Results   Component Value Date    A1C 6.8 2023    A1C 8.1 2023    A1C 6.8 2022    A1C 8.6 2022    A1C 6.1 2022     Last Comprehensive Metabolic Panel:  Sodium   Date Value Ref Range Status   2022 141 133 - 144 mmol/L Final     Potassium   Date Value Ref Range Status   2022 3.6 3.4 - 5.3 mmol/L Final     Chloride   Date Value Ref Range Status   2022 109 94 -  109 mmol/L Final     Carbon Dioxide (CO2)   Date Value Ref Range Status   2022 25 20 - 32 mmol/L Final     Anion Gap   Date Value Ref Range Status   2022 7 3 - 14 mmol/L Final     Glucose   Date Value Ref Range Status   2023 185 (H) 70 - 99 mg/dL Final   2022 81 70 - 99 mg/dL Final     Urea Nitrogen   Date Value Ref Range Status   2023 24.0 (H) 8.0 - 23.0 mg/dL Final   2022 25 7 - 30 mg/dL Final     Creatinine   Date Value Ref Range Status   2023 0.63 0.51 - 0.95 mg/dL Final     GFR Estimate   Date Value Ref Range Status   2023 >90 >60 mL/min/1.73m2 Final     Comment:     eGFR calculated using  CKD-EPI equation.     Calcium   Date Value Ref Range Status   2022 8.7 8.5 - 10.1 mg/dL Final     Bilirubin Total   Date Value Ref Range Status   2022 0.6 0.2 - 1.3 mg/dL Final     Alkaline Phosphatase   Date Value Ref Range Status   2022 97 40 - 150 U/L Final     ALT   Date Value Ref Range Status   2023 31 10 - 35 U/L Final     AST   Date Value Ref Range Status   2023 20 10 - 35 U/L Final       Hypertension: Current medications include   BP Readings from Last 3 Encounters:   23 120/70   23 126/70   22 120/60      Pulse Readings from Last 3 Encounters:   23 86   23 79   22 82     Wt Readings from Last 3 Encounters:   23 134 lb 11.2 oz (61.1 kg)   23 138 lb (62.6 kg)   22 136 lb 8 oz (61.9 kg)     Today's Vitals: There were no vitals taken for this visit.    Diabetes {There is no content from the last Diabetes section.}  ***  {ASAyesno:068332}  {sideeffects:097426}  Blood sugar monitoring: {MTM self monitorin}  Current diabetes symptoms: {Diabetes Symptoms:409695}  Diet/Exercise: ***     Eye exam in the last 12 months? { :982248}    Foot exam is up to date  Urine Albumin:   Lab Results   Component Value Date    UMALCR 15.87 2023      Lab Results   Component Value Date    A1C 6.2  (H) 11/20/2023     Hypertension   amlodipine 10 mg once daily     Patient does not self-monitor blood pressure.  Patient reports no current medication side effects.    Patient reports {side effects:820074}  Patient {HTNDoes/doesnot:328590}.       BP Readings from Last 3 Encounters:   11/20/23 110/54   05/17/23 120/70   04/05/23 126/70     Pulse Readings from Last 3 Encounters:   11/20/23 74   05/17/23 86   04/05/23 79     Hyperlipidemia {There is no content from the last Hyperlipidemia section.}    {LIPID TREATMENT:391608}  Patient reports {mtmlipidsideeffect:999114}  {lipidascvd:698626}       Recent Labs   Lab Test 04/05/23  0911 04/26/22  1347   CHOL 160 124   HDL 45* 49*   LDL 87 46   TRIG 138 144           ***:   ***    Today's Vitals: There were no vitals taken for this visit.  ----------------  {ASHLI?:662290}    I spent {mt total time 3:523345} with this patient today{MTMpartdbillingquestion:354656}. { :119583}. A copy of the visit note was provided to the patient's provider(s).    A summary of these recommendations {GIVEN/NOT GIVEN:569390}.    ***         Medication Therapy Recommendations  No medication therapy recommendations to display

## 2023-11-20 NOTE — PATIENT INSTRUCTIONS
"Recommendations from today's MTM visit:                                                    MTM (medication therapy management) is a service provided by a clinical pharmacist designed to help you get the most of out of your medicines.   Today we reviewed what your medicines are for, how to know if they are working, that your medicines are safe and how to make your medicine regimen as easy as possible.      Talk calcium/vit D twice daily  Decrease Lantus to 24 units daily. If you start to have low blood sugars less than 70 then decrease Lantus by 2 units. You can keep reducing by 2 units every 2-3 days if low blood sugars continue.   Increase Ozempic to 1 mg once a week. Watch for side effects like upset stomach, nausea, diarrhea or worsening in constipation.     Follow-up: Return in about 6 months (around 5/20/2024).    It was great speaking with you today.  I value your experience and would be very thankful for your time in providing feedback in our clinic survey. In the next few days, you may receive an email or text message from [employer] with a link to a survey related to your  clinical pharmacist.\"     To schedule another MTM appointment, please call the clinic directly or you may call the MTM scheduling line at [phone] or toll-free at [phone].     My Clinical Pharmacist's contact information:                                                      Please feel free to contact me with any questions or concerns you have.      Shae Ortiz, PharmD  Medication Therapy Management Pharmacist  [facility] - Monday and Wednesday 7:30 - 4:00    "

## 2023-11-20 NOTE — PROGRESS NOTES
Medication Therapy Management (MTM) Encounter    ASSESSMENT:                            Medication Adherence/Access: No issues identified    Type 2 Diabetes: Patient is meeting A1c goal of < 7%. Self monitoring of blood glucose is at goal fasting readings  for the most part. Patient would benefit from increasing Ozempic and will decrease Lantus to prevent hypoglycemia, see plan. Discussed the importance of watching portion sizes and not grazing. Microalbumin is at goal < 30 mg/g. Not taking an ACE-inhibitor or ARB; is not indicated. If urine albumin increases to > 30 would recommend ACE or ARB. May need to reduce amlodipine if added or switch amlodipine to ACE or ARB. Patient is not taking aspirin due to age.      Hypertension:   Stable  Patient is meeting blood pressure goal of < 140/90mmHg.    Hyperlipidemia:   Stable.    Osteopenia:   Patient is not meeting RDI of calcium 1200mg/day. Patient is not meeting RDI of Vitamin D 1000 IU/day. Patient would benefit from additional supplementation with calcium and vitamin D, see plan.  Also recommend checking a vitamin D to ensure adequate supplementation, ordered future.    PLAN:                            Talk calcium/vit D twice daily  Decrease Lantus to 24 units daily. If you start to have low blood sugars less than 70 then decrease Lantus by 2 units. You can keep reducing by 2 units every 2-3 days if low blood sugars continue.   Increase Ozempic to 1 mg once a week. Watch for side effects like upset stomach, nausea, diarrhea or worsening in constipation.     Follow-up: Return in about 6 months (around 5/20/2024).    SUBJECTIVE/OBJECTIVE:                          Linnette Monsivais is a 73 year old female coming in for a follow-up visit from 5/17/23. Patient was accompanied by her sister Adele.      Reason for visit: diabetes follow-up.    Allergies/ADRs: Reviewed in chart  Past Medical History: Reviewed in chart  Tobacco: She reports that she has never  smoked. She has never been exposed to tobacco smoke. She has never used smokeless tobacco.  Alcohol: not currently using    Medication Adherence/Access: no issues reported    Diabetes   Lantus 28 units once daily  Ozempic 0.5 mg weekly on      Patient reports no side effects increasing the Ozempic dose and no itching at the injection site.     Medication history: She has taken Januvia in the past but was stopped when Ozempic was started.    Blood sugar monitorin time(s) daily. Ranges (from patient's glucose meter):     AM fasting 's with occasional higher reading.    No lows less than 70    From last MTM visit:          Symptoms of low blood sugar? none  Symptoms of high blood sugar? None reported  Eye exam: due - scheduled for   Foot exam: due  Diet/Exercise: Diet was different while in [place]. She tends to graze throughout the day. Her sister was with during visit and said that she eats even when she is not hungry and could cut back on quantity of food. She will be going back to [place] in December and returning end of April.     Eye exam in the last 12 months? No    Foot exam is up to date  Urine Albumin:   Lab Results   Component Value Date    UMALCR 15.87 2023      Lab Results   Component Value Date    A1C 6.2 (H) 2023    A1C 6.8 (H) 2023     Hypertension   amlodipine 10 mg once daily    Patient does not self-monitor blood pressure.  Patient reports no current medication side effects.     BP Readings from Last 3 Encounters:   23 110/54   23 120/70   23 126/70     Pulse Readings from Last 3 Encounters:   23 74   23 86   23 79     Hyperlipidemia   Rosuvastatin 5 mg once daily    Patient reports no side effects.     Recent Labs   Lab Test 23  0911 22  1347   CHOL 160 124   HDL 45* 49*   LDL 87 46   TRIG 138 144     Osteopenia:   calcium 600/Vitamin D 800 once daily  Patient is not experiencing side effects.  DEXA History:  "9/3/2019  Patient is getting  some calcium rich foods .  Risk factors: post-menopausal  Last vitamin D level:  No results found for: \"VITDT\"    Today's Vitals: There were no vitals taken for this visit.  ----------------    I spent 30 minutes with this patient today. All changes were made via collaborative practice agreement with Myesha Crenshaw PA-C. A copy of the visit note was provided to the patient's provider(s).    A summary of these recommendations was given to the patient.    Shae Ortiz, PharmD  Medication Therapy Management Pharmacist     Medication Therapy Recommendations  Osteopenia    Current Medication: Calcium Carb-Cholecalciferol (CALCIUM 600+D3) 600-800 MG-UNIT TABS   Rationale: Dose too low - Dosage too low - Effectiveness   Recommendation: Increase Frequency - Calcium 600+D3 Plus Minerals 600-800 MG-UNIT Tabs   Status: Accepted - no CPA Needed         Type 2 diabetes mellitus without complication, with long-term current use of insulin (H)    Current Medication: semaglutide (OZEMPIC, 0.25 OR 0.5 MG/DOSE,) 2 MG/1.5ML SOPN pen (Discontinued)   Rationale: Dose too low - Dosage too low - Effectiveness   Recommendation: Increase Dose - Ozempic (1 MG/DOSE) 4 MG/3ML Sopn   Status: Accepted per CPA   Note: Also decresed lantus to prevent hypoglycemia            "

## 2023-11-30 DIAGNOSIS — E11.9 TYPE 2 DIABETES MELLITUS WITHOUT COMPLICATION, WITH LONG-TERM CURRENT USE OF INSULIN (H): ICD-10-CM

## 2023-11-30 DIAGNOSIS — Z79.4 TYPE 2 DIABETES MELLITUS WITHOUT COMPLICATION, WITH LONG-TERM CURRENT USE OF INSULIN (H): ICD-10-CM

## 2023-11-30 RX ORDER — BLOOD SUGAR DIAGNOSTIC
STRIP MISCELLANEOUS
Qty: 200 STRIP | Refills: 3 | Status: SHIPPED | OUTPATIENT
Start: 2023-11-30 | End: 2024-06-26

## 2024-02-20 ENCOUNTER — TELEPHONE (OUTPATIENT)
Dept: FAMILY MEDICINE | Facility: CLINIC | Age: 74
End: 2024-02-20

## 2024-02-20 NOTE — CONFIDENTIAL NOTE
Patient Quality Outreach    Patient is due for the following:   Diabetes -  Eye Exam  Colon Cancer Screening    Next Steps:   No follow up needed at this time.    Type of outreach:    Sent letter.      Questions for provider review:    None           Nathan Hernadez MA

## 2024-02-20 NOTE — LETTER
[facility]  [address] [id number] [phone]       February 20, 2024    Linnette Monsivais  [address] [address]    Dear Linnette,    We care about your health and have reviewed your health plan and are making recommendations based on this review, to optimize your health.    You are in particular need of attention regarding:  -Diabetes  -Colon Cancer Screening    We are recommending that you:  -schedule a COLONOSCOPY to look for colon cancer (due every 10 years or 5 years in higher risk situations.)        Colon cancer is now the second leading cause of cancer-related deaths in the [facility] for both men and women and there are over 130,000 new cases and 50,000 deaths per year from colon cancer.  Colonoscopies can prevent 90-95% of these deaths.  Problem lesions can be removed before they ever become cancer.  This test is not only looking for cancer, but also getting rid of precancerious lesions.    If you are under/uninsured, we recommend you contact the [employer] program. [employer] is a free colorectal cancer screening program that provides colonoscopies for eligible under/uninsured [place] men and women. If you are interested in receiving a free colonoscopy, please call [employer] at [phone] (mention code [employer]) to see if you re eligible.      If you do not wish to do a colonoscopy or cannot afford to do one, at this time, there is another option. It is called a FIT test or Fecal Immunochemical Occult Blood Test (take home stool sample kit).  It does not replace the colonoscopy for colorectal cancer screening, but it can detect hidden bleeding in the lower colon.  It does need to be repeated every year and if a positive result is obtained, you would be referred for a colonoscopy.          If you have completed either one of these tests at another facility, please call with the details of when and where the tests were  done and if they were normal or not. Or have the records sent to our clinic so that we can best coordinate your care.    -Diabetic Eye Exam is due.  If this was done within the last 12 months then please contact the clinic with the date and location so we can update your records.    In addition, here is a list of due or overdue Health Maintenance reminders.    Health Maintenance Due   Topic Date Due    Diptheria Tetanus Pertussis (DTAP/TDAP/TD) Vaccine (1 - Tdap) Never done    Zoster (Shingles) Vaccine (1 of 2) Never done    RSV VACCINE (Pregnancy & 60+) (1 - 1-dose 60+ series) Never done    Colorectal Cancer Screening  03/01/2023    Eye Exam  04/01/2023    Flu Vaccine (1) Never done    COVID-19 Vaccine (3 - 2023-24 season) 09/01/2023    PHQ-2 (once per calendar year)  01/01/2024    A1C Lab  02/20/2024       To address the above recommendations, we encourage you to contact us at [phone], via [employer] or by contacting Central Scheduling toll free at [phone] 24 hours a day. They will assist you with finding the most convenient time and location.    Thank you for trusting [facility] and we appreciate the opportunity to serve you.  We look forward to supporting your healthcare needs in the future.    Healthy Regards,    Your [facility] Team

## 2024-04-17 ENCOUNTER — PATIENT OUTREACH (OUTPATIENT)
Dept: CARE COORDINATION | Facility: CLINIC | Age: 74
End: 2024-04-17
Payer: COMMERCIAL

## 2024-04-19 DIAGNOSIS — E11.9 TYPE 2 DIABETES MELLITUS WITHOUT COMPLICATION, WITH LONG-TERM CURRENT USE OF INSULIN (H): ICD-10-CM

## 2024-04-19 DIAGNOSIS — Z79.4 TYPE 2 DIABETES MELLITUS WITHOUT COMPLICATION, WITH LONG-TERM CURRENT USE OF INSULIN (H): ICD-10-CM

## 2024-04-19 RX ORDER — SEMAGLUTIDE 1.34 MG/ML
1 INJECTION, SOLUTION SUBCUTANEOUS
Qty: 12 ML | Refills: 0 | Status: SHIPPED | OUTPATIENT
Start: 2024-04-19 | End: 2024-06-26

## 2024-05-01 ENCOUNTER — PATIENT OUTREACH (OUTPATIENT)
Dept: CARE COORDINATION | Facility: CLINIC | Age: 74
End: 2024-05-01
Payer: COMMERCIAL

## 2024-06-26 ENCOUNTER — OFFICE VISIT (OUTPATIENT)
Dept: FAMILY MEDICINE | Facility: CLINIC | Age: 74
End: 2024-06-26
Payer: COMMERCIAL

## 2024-06-26 ENCOUNTER — OFFICE VISIT (OUTPATIENT)
Dept: PHARMACY | Facility: CLINIC | Age: 74
End: 2024-06-26
Payer: COMMERCIAL

## 2024-06-26 VITALS
WEIGHT: 132 LBS | HEART RATE: 84 BPM | RESPIRATION RATE: 16 BRPM | DIASTOLIC BLOOD PRESSURE: 72 MMHG | BODY MASS INDEX: 28.48 KG/M2 | HEIGHT: 57 IN | SYSTOLIC BLOOD PRESSURE: 134 MMHG | TEMPERATURE: 97.7 F | OXYGEN SATURATION: 97 %

## 2024-06-26 DIAGNOSIS — I10 PRIMARY HYPERTENSION: ICD-10-CM

## 2024-06-26 DIAGNOSIS — Z79.4 TYPE 2 DIABETES MELLITUS WITHOUT COMPLICATION, WITH LONG-TERM CURRENT USE OF INSULIN (H): Primary | ICD-10-CM

## 2024-06-26 DIAGNOSIS — H93.8X3 EAR FULLNESS, BILATERAL: ICD-10-CM

## 2024-06-26 DIAGNOSIS — M85.80 OSTEOPENIA, UNSPECIFIED LOCATION: ICD-10-CM

## 2024-06-26 DIAGNOSIS — E11.9 TYPE 2 DIABETES MELLITUS WITHOUT COMPLICATION, WITH LONG-TERM CURRENT USE OF INSULIN (H): Primary | ICD-10-CM

## 2024-06-26 DIAGNOSIS — E03.9 ACQUIRED HYPOTHYROIDISM: ICD-10-CM

## 2024-06-26 DIAGNOSIS — R09.81 NASAL CONGESTION: ICD-10-CM

## 2024-06-26 DIAGNOSIS — E78.5 HYPERLIPIDEMIA LDL GOAL <130: ICD-10-CM

## 2024-06-26 LAB
CREAT UR-MCNC: 332 MG/DL
HBA1C MFR BLD: 5.4 % (ref 0–5.6)
HOLD SPECIMEN: NORMAL
MICROALBUMIN UR-MCNC: 72.5 MG/L
MICROALBUMIN/CREAT UR: 21.84 MG/G CR (ref 0–25)
T4 FREE SERPL-MCNC: 1 NG/DL (ref 0.9–1.7)
TSH SERPL DL<=0.005 MIU/L-ACNC: 9.28 UIU/ML (ref 0.3–4.2)
VIT D+METAB SERPL-MCNC: 28 NG/ML (ref 20–50)

## 2024-06-26 PROCEDURE — 99207 PR FOOT EXAM NO CHARGE: CPT | Performed by: PHYSICIAN ASSISTANT

## 2024-06-26 PROCEDURE — 36415 COLL VENOUS BLD VENIPUNCTURE: CPT | Performed by: PHYSICIAN ASSISTANT

## 2024-06-26 PROCEDURE — 82043 UR ALBUMIN QUANTITATIVE: CPT | Performed by: PHYSICIAN ASSISTANT

## 2024-06-26 PROCEDURE — 83036 HEMOGLOBIN GLYCOSYLATED A1C: CPT | Performed by: PHYSICIAN ASSISTANT

## 2024-06-26 PROCEDURE — 84439 ASSAY OF FREE THYROXINE: CPT | Performed by: PHYSICIAN ASSISTANT

## 2024-06-26 PROCEDURE — 84443 ASSAY THYROID STIM HORMONE: CPT | Performed by: PHYSICIAN ASSISTANT

## 2024-06-26 PROCEDURE — 99207 PR NO CHARGE LOS: CPT | Performed by: PHARMACIST

## 2024-06-26 PROCEDURE — 82306 VITAMIN D 25 HYDROXY: CPT | Performed by: PHYSICIAN ASSISTANT

## 2024-06-26 PROCEDURE — 82570 ASSAY OF URINE CREATININE: CPT | Performed by: PHYSICIAN ASSISTANT

## 2024-06-26 PROCEDURE — 99214 OFFICE O/P EST MOD 30 MIN: CPT | Performed by: PHYSICIAN ASSISTANT

## 2024-06-26 RX ORDER — LANCETS 33 GAUGE
1 EACH MISCELLANEOUS 2 TIMES DAILY
Qty: 200 EACH | Refills: 3 | Status: SHIPPED | OUTPATIENT
Start: 2024-06-26

## 2024-06-26 RX ORDER — SEMAGLUTIDE 1.34 MG/ML
1 INJECTION, SOLUTION SUBCUTANEOUS
Qty: 9 ML | Refills: 0 | Status: SHIPPED | OUTPATIENT
Start: 2024-06-26 | End: 2024-09-27

## 2024-06-26 RX ORDER — INSULIN GLARGINE 100 [IU]/ML
15 INJECTION, SOLUTION SUBCUTANEOUS DAILY
Qty: 15 ML | Refills: 0 | Status: SHIPPED | OUTPATIENT
Start: 2024-06-26 | End: 2024-09-30

## 2024-06-26 RX ORDER — AMLODIPINE BESYLATE 10 MG/1
10 TABLET ORAL DAILY
Qty: 90 TABLET | Refills: 0 | Status: SHIPPED | OUTPATIENT
Start: 2024-06-26 | End: 2024-09-19

## 2024-06-26 RX ORDER — ROSUVASTATIN CALCIUM 5 MG/1
5 TABLET, COATED ORAL DAILY
Qty: 90 TABLET | Refills: 0 | Status: SHIPPED | OUTPATIENT
Start: 2024-06-26 | End: 2024-09-26

## 2024-06-26 RX ORDER — LEVOFLOXACIN 500 MG/1
500 TABLET, FILM COATED ORAL DAILY
Qty: 14 TABLET | Refills: 0 | Status: CANCELLED | OUTPATIENT
Start: 2024-06-26

## 2024-06-26 RX ORDER — LEVOTHYROXINE SODIUM 88 UG/1
TABLET ORAL
Qty: 72 TABLET | Refills: 0 | Status: SHIPPED | OUTPATIENT
Start: 2024-06-26 | End: 2024-09-30

## 2024-06-26 RX ORDER — AMOXICILLIN 250 MG/1
750 CAPSULE ORAL 3 TIMES DAILY
Qty: 126 CAPSULE | Refills: 0 | Status: CANCELLED | OUTPATIENT
Start: 2024-06-26

## 2024-06-26 RX ORDER — BLOOD SUGAR DIAGNOSTIC
STRIP MISCELLANEOUS
Qty: 200 STRIP | Refills: 3 | Status: SHIPPED | OUTPATIENT
Start: 2024-06-26

## 2024-06-26 RX ORDER — FLUTICASONE PROPIONATE 50 MCG
1 SPRAY, SUSPENSION (ML) NASAL DAILY
Qty: 16 G | Refills: 1 | Status: SHIPPED | OUTPATIENT
Start: 2024-06-26

## 2024-06-26 ASSESSMENT — PAIN SCALES - GENERAL: PAINLEVEL: NO PAIN (0)

## 2024-06-26 NOTE — PROGRESS NOTES
Medication Therapy Management (MTM) Encounter    ASSESSMENT:                            Medication Adherence/Access: No issues identified    Type 2 Diabetes:   Patient is meeting A1c goal of < 7% but now less than 6% with recent lows and one critical low with EMS called. Self monitoring of blood glucose is at goal fasting readings . Her postprandial readings are still sometimes elevated.  We discussed possibly increasing Ozempic but with her leaving the [facility] and potentially not coming back to [place], Myesha Crenshaw and I thought it would be best to just decrease Lantus and continue Ozempic for now. I asked her to decrease Lantus further if she continues to have blood sugars less than 70, see plan. I asked her to call and schedule appointment with provider in [place] for end of July. Encouraged her to discuss getting a continuous glucose monitor to monitor for hypoglycemia.  Microalbumin is at goal < 30 mg/g, rechecking today. If urine albumin increases to > 30 would recommend ACE or ARB. May need to reduce amlodipine if added or switch amlodipine to ACE or ARB. Patient is not taking aspirin due to age.      I refilled all her medications and diabetes supplies for 3 months since she will be leaving [place]. I did get a verbal from [facility].    PLAN:                            Schedule diabetes eye exam if you have not done so already. This should be done every year to check for damages to your eyes from having diabetes.  Refilled all meds as they are .  Try to cut your calcium tablets in half or buy chew tabs instead. You should get 600 mg of calcium twice. For now you can cut the calcium tablets in half so they are not so big and take two half tablets twice daily.  Don't take the calcium with your levothyroxine. You can take levothyroxine before breakfast and wait an hour then eat breakfast and take calcium with breakfast.  When you go out to [place] ask your doctor about getting a  continuous glucose monitor (Dexcom and Freestyle Zahra)  Call your psychiatry provider to get a refill on olanzapine before you leave. Erendira Hernandez at [phone]  I sent refills of all your other medicines to our pharmacy.   Decrease Lantus to 15 units once daily in the morning. If you have any low blood sugars less than 70, then decrease further to 13 units once daily.   Continue Ozempic 1 mg once weekly for now  Call your clinic in [place] and schedule a visit to establish care at the end of July.    Follow-up: when/if she comes back to [place].    SUBJECTIVE/OBJECTIVE:                          Linnette Monsivais is a 73 year old female seen for a follow-up visit from 23. Patient was accompanied by her brother-in-law Sarbjit.      Reason for visit: follow-up diabetes.    Allergies/ADRs: Reviewed in chart  Past Medical History: Reviewed in chart  Tobacco: She reports that she has never smoked. She has never been exposed to tobacco smoke. She has never used smokeless tobacco.  Alcohol: not currently using    Medication Adherence/Access: will miss a dose of medicine about once a month. She never missed insulin or Ozempic.     Patient will be leaving [place] and traveling to [place] and possibly back to [place]. It us uncertain if she will be back to [place]. He brother-in-law was with her today and stated that his wife and Linnette (sisters) have a hard time getting along due to mental health issues. He stated that his wife likely has schizophrenia that is undiagnosed and this complicated things. Linnette may need to move to another family members house with this conflict going on.    Diabetes   Lantus 20 units once daily in the morning - decreased at last [facility] visit and further  on own but not sure how long on current dose.  Ozempic 1 mg weekly on Sundays- increased at last MT visit  Aspirin - Not taking due to age    Patient reports she has a little nausea once in a while but doesn't seem  related to when she gives her dose.  She also has some constipation but she has dealt with constipation most of her life. She thinks this is the same since starting Ozempic. Reports no itching at the injection site.     Medication history: She has taken Januvia in the past but was stopped when Ozempic was started.    Blood sugar monitorin time(s) daily. Ranges (from patient's glucose meter):               She has had a few low readings in the last couple months. She had a really low blood sugar about a month ago in the afternoon and 911 was called because she was rolling around on the floor and wasn't able to take anything orally. It was likely due to not eating lunch most days. Her brother-in-law Sarbjit said he could not get a blood sugar. She rolled off the chair onto the floor and she was incoherent.     From last [facility] visit:  AM fasting 's with occasional higher reading.    No lows less than 70      Diet/Exercise: She does feel full faster and eating less. She doesn't feel very hungry. She does eat breakfast and dinner but not always eating lunch. She is not snacking during the day. Diet is different while in [place]. She tends to graze throughout the day. She is going to [place] from 7/10 through October, then going to [place] in October through spring 2025.     Foot exam: up to date  Eye exam: due      Today's Vitals: There were no vitals taken for this visit. - checked at primary care provider visit earlier today.  ----------------      I spent 45 minutes with this patient today. All changes were made via verbal approval with Myesha Crenshaw PA-C. A copy of the visit note was provided to the patient's provider(s).    A summary of these recommendations was given to the patient.    Shae Ortiz, PharmD  Medication Therapy Management Pharmacist       Medication Therapy Recommendations  Type 2 diabetes mellitus without complication, with long-term current use of insulin (H)    Current Medication: insulin  glargine (LANTUS SOLOSTAR) 100 UNIT/ML pen (Discontinued)   Rationale: Dose too high - Dosage too high - Safety   Recommendation: Decrease Dose - Lantus SoloStar 100 UNIT/ML soln   Status: Accepted per Provider

## 2024-06-26 NOTE — PROGRESS NOTES
Assessment & Plan     Type 2 diabetes mellitus without complication, with long-term current use of insulin (H)  Concern with low blood sugars recently - A1c is now less than 6% with recent lows and one critical low with EMS called. She also had visit with MTM pharmacist (Shae Ortiz) today after our visit and they reviewed blood sugars in more detail. After our visits, Shae and I discussed plan moving forward. Since she will be moving in less than 2 weeks and likely not returning to [place], we didn't want to make too many medication changes since we won't be able to follow-up/monitor. For now, will decrease Lantus to 15 units daily and keep Ozempic dose the same. If any blood sugars <70, further decrease Lantus to 13 units daily. She should establish care with new provider in [place] in July after she moves there for diabetes follow-up. I think she would benefit from CGM and she should discuss this with her new provider in [place].   - Hemoglobin A1c; Future  - Albumin Random Urine Quantitative with Creat Ratio; Future  - Hemoglobin A1c  - Albumin Random Urine Quantitative with Creat Ratio  - FOOT EXAM    Acquired hypothyroidism  Due for monitoring labs  - TSH with free T4 reflex; Future  - TSH with free T4 reflex  - T4 free    Osteopenia  - Vitamin D Deficiency    Ear fullness, bilateral  Nasal congestion  Requesting refill. Has had some fullness in ears, especially when laying down, for past few days. Also has had nasal congestion, itchy nose.  - fluticasone (FLONASE) 50 MCG/ACT nasal spray; Spray 1 spray into both nostrils daily (Patient not taking: Reported on 6/26/2024)    Primary hypertension  Controlled, continue present management. Microalbumin is at goal < 30 mg/g, rechecking today. If urine albumin increases to > 30 would recommend ACE or ARB. May need to reduce amlodipine if added or switch amlodipine to ACE or ARB.     BMI  Estimated body mass index is 28.56 kg/m  as calculated from the following:     "Height as of this encounter: 1.448 m (4' 9\").    Weight as of this encounter: 59.9 kg (132 lb).   Weight management plan: Discussed healthy diet and exercise guidelines  Blood sugar testing frequency justification:  Frequent hypoglycemia and Risk of hypoglycemia with medication(s)      John Anglin is a 73 year old, presenting for the following health issues:  Diabetes, Hypertension, and Thyroid Problem        2024     9:58 AM   Additional Questions   Roomed by kori     History of Present Illness       Diabetes:   She presents for follow up of diabetes.  She is checking home blood glucose two times daily.   She checks blood glucose before meals and at bedtime.  Blood glucose is never over 200 and sometimes under 70. She is aware of hypoglycemia symptoms including dizziness and confusion.    She has no concerns regarding her diabetes at this time.   She is not experiencing numbness or burning in feet, excessive thirst, blurry vision, weight changes or redness, sores or blisters on feet. The patient has not had a diabetic eye exam in the last 12 months.          Hypertension: She presents for follow up of hypertension.  She does not check blood pressure  regularly outside of the clinic. Outpatient blood pressures have not been over 140/90. She follows a low salt diet.     Hypothyroidism:     Since last visit, patient describes the following symptoms::  Constipation and Dry skin    She eats 2-3 servings of fruits and vegetables daily.She consumes 1 sweetened beverage(s) daily.She exercises with enough effort to increase her heart rate 9 or less minutes per day.  She exercises with enough effort to increase her heart rate 3 or less days per week.   She is taking medications regularly.    She is here with her brother in law today.    Lantus 20 units once daily in the morning - decreased at last MT visit and further  on own but not sure how long on current dose.  Ozempic 1 mg weekly on Sundays- " "increased at last [facility] visit    Checks blood sugars twice daily  - AM fasting glucose - always <100, often in 70s  - PM glucose - usually in 150s    She has had a few low readings in the last couple months, 3 in the last month  2 lows were in the 60s and she felt dizzy, weak - drank orange juice and felt better  Brother in law says she had a critical low blood sugar of 28 about 3 weeks ago. At that time, she was confused, weak, really agitated - \"rolling on floor back and forth\"  EMS were called and they report that EMS confirmed low blood sugar; she felt better after eating.    Breakfast around 10 AM - piece of bread with olive, cheese, butter, or peanut butter.   Takes insulin around 11 AM  Might have banana or tangerine during the day but otherwise usually doesn't eat until dinner at 5 PM and has a full meal at that time, then is done eating for the day    Lab Results   Component Value Date    A1C 5.4 06/26/2024    A1C 6.2 11/20/2023    A1C 6.8 05/17/2023    A1C 8.1 04/05/2023    A1C 6.8 11/30/2022     HTN: amlodipine 10 mg daily    Hyperlipidemia: rosuvastatin 5 mg daily    Patient will be leaving [place] on 7/10 and traveling to [place] and possibly back to [place] in the fall. It us uncertain if she will be back to [place]. Per MT note from today: \"He brother-in-law was with her today and stated that his wife and iLnnette (sisters) have a hard time getting along due to mental health issues. He stated that his wife likely has schizophrenia that is undiagnosed and this complicated things. Linnette may need to move to another family members house with this conflict going on.\"      Review of Systems  Constitutional, HEENT, cardiovascular, pulmonary, gi and gu systems are negative, except as otherwise noted.      Objective    /72   Pulse 84   Temp 97.7  F (36.5  C)   Resp 16   Ht 1.448 m (4' 9\")   Wt 59.9 kg (132 lb)   SpO2 97%   BMI 28.56 kg/m    Body mass index is 28.56 kg/m .  Physical Exam "   GENERAL: alert and no distress  EYES: Eyes grossly normal to inspection, PERRL and conjunctivae and sclerae normal  NECK: no adenopathy  RESP: lungs clear to auscultation - no rales, rhonchi or wheezes  CV: regular rate and rhythm, normal S1 S2, no S3 or S4, no murmur, click or rub, no peripheral edema  NEURO: Normal strength and tone, mentation intact and speech normal  Diabetic foot exam: normal DP and PT pulses, no trophic changes or ulcerative lesions, normal sensory exam, and normal monofilament exam          Signed Electronically by: Myesha Crenshaw PA-C

## 2024-06-26 NOTE — PATIENT INSTRUCTIONS
"Recommendations from today's MT visit:                                                       Schedule diabetes eye exam if you have not done so already. This should be done every year to check for damages to your eyes from having diabetes.  Refill all meds as they are .  Try to cut your calcium tablets in half or buy chew tabs instead. You should get 600 mg of calcium twice. For now you can cut the calcium tablets in half so they are not so big and take two half tablets twice daily.  Don't take the calcium with your levothyroxine. You can take levothyroxine before breakfast and wait an hour then eat breakfast and take calcium with breakfast.  When you go out to [place] ask your doctor about getting a continuous glucose monitor (Dexcom and Freestyle Zahra)  Call your psychiatry provider to get a refill on olanzapine before you leave. Erendira David at [phone]  I sent refills of all your other medicines to our pharmacy.   Decrease Lantus to 15 units once daily in the morning. If you have any low blood sugars less than 70, then decrease further to 13 units once daily.   Continue Ozempic 1 mg once weekly for now  Call your clinic in [place] and schedule a visit to establish care at the end of July.    Follow-up: When/if you come back to [place].    It was great speaking with you today.  I value your experience and would be very thankful for your time in providing feedback in our clinic survey. In the next few days, you may receive an email or text message from [employer] with a link to a survey related to your  clinical pharmacist.\"     To schedule another MT appointment, please call the clinic directly or you may call the MTM scheduling line at [phone] or toll-free at [phone].     My Clinical Pharmacist's contact information:                                                      Please feel free to contact me with any questions or concerns you have.      Shae Ortiz, PharmD  Medication " Therapy Management Pharmacist  [facility] - Monday and Wednesday 7:30 - 4:00

## 2024-06-27 ENCOUNTER — TELEPHONE (OUTPATIENT)
Dept: FAMILY MEDICINE | Facility: CLINIC | Age: 74
End: 2024-06-27
Payer: COMMERCIAL

## 2024-06-27 PROBLEM — I10 PRIMARY HYPERTENSION: Status: ACTIVE | Noted: 2024-06-27

## 2024-06-27 NOTE — TELEPHONE ENCOUNTER
----- Message from Myesha Crenshaw sent at 6/27/2024 10:47 AM CDT -----  Please call with     Thyroid labs show elevated TSH (thyroid stimulating hormone). This could mean that the dose of levothyroxine is too low or that something is interfering with how she is absorbing medication. When she saw Shae Ortiz yesterday, they discussed not taking calcium with levothyroxine. If she has been taking levothyroxine with calcium, she may not be absorbing the full dose of the levothyroxine which could lead to having higher TSH. Has she been taking the levothyroxine with calcium? If so, would stay on same dose of levothyroxine for now and make sure to take levothyroxine in the morning before breakfast and wait at least an hour to eat breakfast and take calcium supplement. She should follow-up with her clinic in [place] at the end of July to recheck thyroid labs.     Thanks!  Myesha Crenshaw PA-C

## 2024-06-27 NOTE — TELEPHONE ENCOUNTER
"Notified Patient of Myesha Crenshaw PA-C's message with : \"Thyroid labs show elevated TSH (thyroid stimulating hormone). This could mean that the dose of levothyroxine is too low or that something is interfering with how she is absorbing medication. When she saw Shae Ortiz yesterday, they discussed not taking calcium with levothyroxine. If she has been taking levothyroxine with calcium, she may not be absorbing the full dose of the levothyroxine which could lead to having higher TSH. Has she been taking the levothyroxine with calcium? If so, would stay on same dose of levothyroxine for now and make sure to take levothyroxine in the morning before breakfast and wait at least an hour to eat breakfast and take calcium supplement. She should follow-up with her clinic in [place] at the end of July to recheck thyroid labs.\"    She was taking levothyroxine with calcium. Person was given an opportunity to ask questions, verbalized understanding of plan, and is agreeable.    Mitzi Baker RN on 6/27/2024 at 11:30 AM    "

## 2024-07-11 DIAGNOSIS — E78.5 HYPERLIPIDEMIA LDL GOAL <130: ICD-10-CM

## 2024-07-12 RX ORDER — ROSUVASTATIN CALCIUM 5 MG/1
5 TABLET, COATED ORAL DAILY
Qty: 90 TABLET | Refills: 0 | OUTPATIENT
Start: 2024-07-12

## 2024-09-19 DIAGNOSIS — I10 PRIMARY HYPERTENSION: ICD-10-CM

## 2024-09-19 RX ORDER — AMLODIPINE BESYLATE 10 MG/1
10 TABLET ORAL DAILY
Qty: 90 TABLET | Refills: 2 | Status: SHIPPED | OUTPATIENT
Start: 2024-09-19

## 2024-09-26 DIAGNOSIS — E78.5 HYPERLIPIDEMIA LDL GOAL <130: ICD-10-CM

## 2024-09-26 RX ORDER — ROSUVASTATIN CALCIUM 5 MG/1
5 TABLET, COATED ORAL DAILY
Qty: 90 TABLET | Refills: 1 | Status: SHIPPED | OUTPATIENT
Start: 2024-09-26

## 2024-09-27 DIAGNOSIS — E11.9 TYPE 2 DIABETES MELLITUS WITHOUT COMPLICATION, WITH LONG-TERM CURRENT USE OF INSULIN (H): ICD-10-CM

## 2024-09-27 DIAGNOSIS — Z79.4 TYPE 2 DIABETES MELLITUS WITHOUT COMPLICATION, WITH LONG-TERM CURRENT USE OF INSULIN (H): ICD-10-CM

## 2024-09-27 DIAGNOSIS — E03.9 ACQUIRED HYPOTHYROIDISM: ICD-10-CM

## 2024-09-27 RX ORDER — SEMAGLUTIDE 1.34 MG/ML
INJECTION, SOLUTION SUBCUTANEOUS
Qty: 9 ML | Refills: 0 | Status: SHIPPED | OUTPATIENT
Start: 2024-09-27

## 2024-09-30 RX ORDER — INSULIN GLARGINE 100 [IU]/ML
INJECTION, SOLUTION SUBCUTANEOUS
Qty: 15 ML | Refills: 0 | Status: SHIPPED | OUTPATIENT
Start: 2024-09-30

## 2024-09-30 RX ORDER — LEVOTHYROXINE SODIUM 88 UG/1
TABLET ORAL
Qty: 72 TABLET | Refills: 0 | Status: SHIPPED | OUTPATIENT
Start: 2024-09-30

## 2024-10-07 ENCOUNTER — PATIENT OUTREACH (OUTPATIENT)
Dept: CARE COORDINATION | Facility: CLINIC | Age: 74
End: 2024-10-07
Payer: COMMERCIAL

## 2024-10-28 ENCOUNTER — OFFICE VISIT (OUTPATIENT)
Dept: FAMILY MEDICINE | Facility: CLINIC | Age: 74
End: 2024-10-28
Payer: COMMERCIAL

## 2024-10-28 VITALS
RESPIRATION RATE: 17 BRPM | DIASTOLIC BLOOD PRESSURE: 71 MMHG | HEART RATE: 82 BPM | BODY MASS INDEX: 29.02 KG/M2 | OXYGEN SATURATION: 96 % | HEIGHT: 57 IN | WEIGHT: 134.5 LBS | SYSTOLIC BLOOD PRESSURE: 125 MMHG | TEMPERATURE: 97.9 F

## 2024-10-28 DIAGNOSIS — E11.9 TYPE 2 DIABETES MELLITUS WITHOUT COMPLICATION, WITH LONG-TERM CURRENT USE OF INSULIN (H): Primary | ICD-10-CM

## 2024-10-28 DIAGNOSIS — Z12.11 SCREEN FOR COLON CANCER: ICD-10-CM

## 2024-10-28 DIAGNOSIS — Z79.4 TYPE 2 DIABETES MELLITUS WITHOUT COMPLICATION, WITH LONG-TERM CURRENT USE OF INSULIN (H): Primary | ICD-10-CM

## 2024-10-28 DIAGNOSIS — E03.9 ACQUIRED HYPOTHYROIDISM: ICD-10-CM

## 2024-10-28 DIAGNOSIS — F20.0 PARANOID SCHIZOPHRENIA, CHRONIC CONDITION (H): ICD-10-CM

## 2024-10-28 DIAGNOSIS — E78.5 HYPERLIPIDEMIA LDL GOAL <130: ICD-10-CM

## 2024-10-28 PROCEDURE — 99214 OFFICE O/P EST MOD 30 MIN: CPT | Performed by: PHYSICIAN ASSISTANT

## 2024-10-28 PROCEDURE — G2211 COMPLEX E/M VISIT ADD ON: HCPCS | Performed by: PHYSICIAN ASSISTANT

## 2024-10-28 RX ORDER — INSULIN GLARGINE 100 [IU]/ML
15 INJECTION, SOLUTION SUBCUTANEOUS DAILY
Qty: 15 ML | Refills: 0 | Status: SHIPPED | OUTPATIENT
Start: 2024-10-28 | End: 2024-11-04

## 2024-10-28 NOTE — PATIENT INSTRUCTIONS
Do not take calcium with your levothyroxine. You can take levothyroxine before breakfast and wait an hour then eat breakfast and take calcium with breakfast.

## 2024-10-28 NOTE — PROGRESS NOTES
Assessment & Plan     Type 2 diabetes mellitus without complication, with long-term current use of insulin (H)  Based on review of blood sugars, diabetes appears well controlled. She is no longer having any low blood sugars. Will return for fasting labs in 2 days and will check A1c at that time. For now will continue present management. Microalbumin is at goal < 30 mg/g. If urine albumin increases to > 30 would recommend ACE or ARB. May need to reduce amlodipine if added or switch amlodipine to ACE or ARB. Patient is not taking aspirin due to age. Blood sugar testing frequency justification:  Risk of hypoglycemia with medication(s). As long as A1c is stable, plan to follow-up in 6 months.  - Hemoglobin A1c; Future  - Basic metabolic panel  (Ca, Cl, CO2, Creat, Gluc, K, Na, BUN); Future  - Adult Eye  Referral; Future  - insulin glargine (LANTUS SOLOSTAR) 100 UNIT/ML pen; Inject 15 Units subcutaneously daily.    Acquired hypothyroidism  TSH was elevated at last lab check but she had been taking levothyroxine with calcium. She has now been  medications so will recheck thyroid labs when she returns in 2 days. Will adjust meds if needed.  - TSH with free T4 reflex; Future    Hyperlipidemia LDL goal <130  She is due for fasting labs. Will return for fasting labs in 2 days.  - Lipid panel reflex to direct LDL Fasting; Future  - ALT; Future    Screen for colon cancer  - Colonoscopy Screening  Referral; Future    Paranoid schizophrenia, chronic condition (H)  Chronic, follows with psychiatry     She declines vaccines today    The longitudinal plan of care for the diagnosis(es)/condition(s) as documented were addressed during this visit. Due to the added complexity in care, I will continue to support Linnette in the subsequent management and with ongoing continuity of care.    John Anglin is a 74 year old, presenting for the following health issues:  Follow Up (DM, cholesterol, BP,  and thyroid)        10/28/2024     9:00 AM   Additional Questions   Roomed by Patricia BRODY   Accompanied by sister douglas         10/28/2024     9:00 AM   Patient Reported Additional Medications   Patient reports taking the following new medications n/a     History of Present Illness       Diabetes:   She presents for follow up of diabetes.  She is checking home blood glucose two times daily.   She checks blood glucose before meals and at bedtime.  Blood glucose is never over 200 and never under 70. She is aware of hypoglycemia symptoms including shakiness and confusion.    She has no concerns regarding her diabetes at this time.  She is having numbness in feet and weight gain.  The patient has not had a diabetic eye exam in the last 12 months.          Hyperlipidemia:  She presents for follow up of hyperlipidemia.   She is taking medication to lower cholesterol. She is not having myalgia or other side effects to statin medications.    Hypertension: She presents for follow up of hypertension.  She does not check blood pressure  regularly outside of the clinic. Outpatient blood pressures have not been over 140/90. She follows a low salt diet.     Hypothyroidism:     Since last visit, patient describes the following symptoms::  Constipation and Dry skin    She eats 2-3 servings of fruits and vegetables daily.She consumes 1 sweetened beverage(s) daily.She exercises with enough effort to increase her heart rate 9 or less minutes per day.  She exercises with enough effort to increase her heart rate 3 or less days per week.   She is taking medications regularly.     Last seen 6/26/24 at which time her Lantus dose was decreased to 15 units due to hypoglycemia  She continues with Ozempic 1 mg weekly   Since then, she has not had any hypoglycemia, no blood sugars <80    Checks blood sugars twice daily  - AM fasting glucose - usually in 90s  - PM glucose - usually in 160s    Per glucometer:  7 day average 119  14 day average  "122  30 day average 138    She is still possibly moving to [place] but even if she moves she would plan to continue coming here for her medical care as she would still be here often visiting family.    She overall feels well  No concerns today    Review of Systems  Constitutional, HEENT, cardiovascular, pulmonary, gi and gu systems are negative, except as otherwise noted.      Objective    /71 (BP Location: Right arm, Patient Position: Sitting, Cuff Size: Adult Regular)   Pulse 82   Temp 97.9  F (36.6  C) (Oral)   Resp 17   Ht 1.448 m (4' 9\")   Wt 61 kg (134 lb 8 oz)   SpO2 96%   BMI 29.11 kg/m    Body mass index is 29.11 kg/m .  Physical Exam   GENERAL: alert and no distress  EYES: Eyes grossly normal to inspection, PERRL and conjunctivae and sclerae normal  NECK: no adenopathy  RESP: lungs clear to auscultation - no rales, rhonchi or wheezes  CV: regular rate and rhythm, normal S1 S2, no S3 or S4, no murmur, click or rub, no peripheral edema  NEURO: Mentation intact and speech normal  PSYCH: mentation appears normal, affect normal/bright        Signed Electronically by: Myesha Crenshaw PA-C    "

## 2024-10-31 ENCOUNTER — LAB (OUTPATIENT)
Dept: LAB | Facility: CLINIC | Age: 74
End: 2024-10-31
Payer: COMMERCIAL

## 2024-10-31 DIAGNOSIS — E78.5 HYPERLIPIDEMIA LDL GOAL <130: ICD-10-CM

## 2024-10-31 DIAGNOSIS — E03.9 ACQUIRED HYPOTHYROIDISM: ICD-10-CM

## 2024-10-31 DIAGNOSIS — E11.9 TYPE 2 DIABETES MELLITUS WITHOUT COMPLICATION, WITH LONG-TERM CURRENT USE OF INSULIN (H): ICD-10-CM

## 2024-10-31 DIAGNOSIS — Z79.4 TYPE 2 DIABETES MELLITUS WITHOUT COMPLICATION, WITH LONG-TERM CURRENT USE OF INSULIN (H): ICD-10-CM

## 2024-10-31 LAB
EST. AVERAGE GLUCOSE BLD GHB EST-MCNC: 126 MG/DL
HBA1C MFR BLD: 6 % (ref 0–5.6)

## 2024-10-31 PROCEDURE — 80061 LIPID PANEL: CPT

## 2024-10-31 PROCEDURE — 84460 ALANINE AMINO (ALT) (SGPT): CPT

## 2024-10-31 PROCEDURE — 84439 ASSAY OF FREE THYROXINE: CPT

## 2024-10-31 PROCEDURE — 83036 HEMOGLOBIN GLYCOSYLATED A1C: CPT

## 2024-10-31 PROCEDURE — 80048 BASIC METABOLIC PNL TOTAL CA: CPT

## 2024-10-31 PROCEDURE — 36415 COLL VENOUS BLD VENIPUNCTURE: CPT

## 2024-10-31 PROCEDURE — 84443 ASSAY THYROID STIM HORMONE: CPT

## 2024-11-01 ENCOUNTER — TRANSFERRED RECORDS (OUTPATIENT)
Dept: HEALTH INFORMATION MANAGEMENT | Facility: CLINIC | Age: 74
End: 2024-11-01

## 2024-11-01 ENCOUNTER — TELEPHONE (OUTPATIENT)
Dept: FAMILY MEDICINE | Facility: CLINIC | Age: 74
End: 2024-11-01
Payer: COMMERCIAL

## 2024-11-01 DIAGNOSIS — E03.9 ACQUIRED HYPOTHYROIDISM: ICD-10-CM

## 2024-11-01 LAB
ALT SERPL W P-5'-P-CCNC: 24 U/L (ref 0–50)
ANION GAP SERPL CALCULATED.3IONS-SCNC: 9 MMOL/L (ref 7–15)
BUN SERPL-MCNC: 13.1 MG/DL (ref 8–23)
CALCIUM SERPL-MCNC: 8.9 MG/DL (ref 8.8–10.4)
CHLORIDE SERPL-SCNC: 107 MMOL/L (ref 98–107)
CHOLEST SERPL-MCNC: 133 MG/DL
CREAT SERPL-MCNC: 0.68 MG/DL (ref 0.51–0.95)
EGFRCR SERPLBLD CKD-EPI 2021: >90 ML/MIN/1.73M2
FASTING STATUS PATIENT QL REPORTED: YES
FASTING STATUS PATIENT QL REPORTED: YES
GLUCOSE SERPL-MCNC: 85 MG/DL (ref 70–99)
HCO3 SERPL-SCNC: 26 MMOL/L (ref 22–29)
HDLC SERPL-MCNC: 58 MG/DL
LDLC SERPL CALC-MCNC: 57 MG/DL
NONHDLC SERPL-MCNC: 75 MG/DL
POTASSIUM SERPL-SCNC: 3.9 MMOL/L (ref 3.4–5.3)
SODIUM SERPL-SCNC: 142 MMOL/L (ref 135–145)
T4 FREE SERPL-MCNC: 1.17 NG/DL (ref 0.9–1.7)
TRIGL SERPL-MCNC: 88 MG/DL
TSH SERPL DL<=0.005 MIU/L-ACNC: 15.1 UIU/ML (ref 0.3–4.2)

## 2024-11-01 RX ORDER — LEVOTHYROXINE SODIUM 88 UG/1
88 TABLET ORAL DAILY
Qty: 90 TABLET | Refills: 0 | Status: SHIPPED | OUTPATIENT
Start: 2024-11-01

## 2024-11-01 NOTE — TELEPHONE ENCOUNTER
----- Message from Myesha Crenshaw sent at 11/1/2024  9:04 AM CDT -----  Please call. Help schedule lab visit for thyroid test in 6 weeks.      Thyroid labs still show elevated TSH which means that the dose of levothyroxine is too low. Recommend increasing dose of levothyroxine to 88 mcg every day instead of 88 mcg 6 days per week. Will need to recheck thyroid labs in 6 weeks.    Hemoglobin A1c is stable at 6.0. Continue same diabetes medications.    Metabolic panel shows normal kidney function, electrolytes, and blood sugar.    Cholesterol levels are well controlled. Continue rosuvastatin.      Myesha Crenshaw PA-C

## 2024-11-01 NOTE — TELEPHONE ENCOUNTER
Call made to patient using , patient's sister answered. Pt gave verbal permission to speak to sister, does not want to use . Results relayed to sister and lab only appointment scheduled.    Lillian Watters RN on 11/1/2024 at 9:29 AM

## 2024-11-04 ENCOUNTER — OFFICE VISIT (OUTPATIENT)
Dept: PHARMACY | Facility: CLINIC | Age: 74
End: 2024-11-04
Payer: COMMERCIAL

## 2024-11-04 VITALS
OXYGEN SATURATION: 97 % | SYSTOLIC BLOOD PRESSURE: 124 MMHG | HEART RATE: 77 BPM | DIASTOLIC BLOOD PRESSURE: 69 MMHG | BODY MASS INDEX: 28.78 KG/M2 | WEIGHT: 133 LBS

## 2024-11-04 DIAGNOSIS — E11.9 TYPE 2 DIABETES MELLITUS WITHOUT COMPLICATION, WITH LONG-TERM CURRENT USE OF INSULIN (H): ICD-10-CM

## 2024-11-04 DIAGNOSIS — Z79.4 TYPE 2 DIABETES MELLITUS WITHOUT COMPLICATION, WITH LONG-TERM CURRENT USE OF INSULIN (H): ICD-10-CM

## 2024-11-04 PROCEDURE — 99207 PR NO CHARGE LOS: CPT | Performed by: PHARMACIST

## 2024-11-04 RX ORDER — INSULIN GLARGINE 100 [IU]/ML
20 INJECTION, SOLUTION SUBCUTANEOUS DAILY
Qty: 45 ML | Refills: 0 | Status: SHIPPED | OUTPATIENT
Start: 2024-11-04

## 2024-11-04 NOTE — PATIENT INSTRUCTIONS
Recommendations from today's [facility] visit:                                                         Call insurance and find out if they allow a vacation override for your upcoming trip.   Call and reschedule with Myesha in June. Schedule with me on the same day back to back.  Last lab work  Recent Results (from the past 240 hours)   Hemoglobin A1c    Collection Time: 10/31/24  9:53 AM   Result Value Ref Range    Estimated Average Glucose 126 (H) <117 mg/dL    Hemoglobin A1C 6.0 (H) 0.0 - 5.6 %   Basic metabolic panel  (Ca, Cl, CO2, Creat, Gluc, K, Na, BUN)    Collection Time: 10/31/24  9:53 AM   Result Value Ref Range    Sodium 142 135 - 145 mmol/L    Potassium 3.9 3.4 - 5.3 mmol/L    Chloride 107 98 - 107 mmol/L    Carbon Dioxide (CO2) 26 22 - 29 mmol/L    Anion Gap 9 7 - 15 mmol/L    Urea Nitrogen 13.1 8.0 - 23.0 mg/dL    Creatinine 0.68 0.51 - 0.95 mg/dL    GFR Estimate >90 >60 mL/min/1.73m2    Calcium 8.9 8.8 - 10.4 mg/dL    Glucose 85 70 - 99 mg/dL    Patient Fasting > 8hrs? Yes    Lipid panel reflex to direct LDL Fasting    Collection Time: 10/31/24  9:53 AM   Result Value Ref Range    Cholesterol 133 <200 mg/dL    Triglycerides 88 <150 mg/dL    Direct Measure HDL 58 >=50 mg/dL    LDL Cholesterol Calculated 57 <100 mg/dL    Non HDL Cholesterol 75 <130 mg/dL    Patient Fasting > 8hrs? Yes    ALT    Collection Time: 10/31/24  9:53 AM   Result Value Ref Range    ALT 24 0 - 50 U/L   TSH with free T4 reflex    Collection Time: 10/31/24  9:53 AM   Result Value Ref Range    TSH 15.10 (H) 0.30 - 4.20 uIU/mL   T4 free    Collection Time: 10/31/24  9:53 AM   Result Value Ref Range    Free T4 1.17 0.90 - 1.70 ng/dL        Follow-up: Return in 7 months (on 6/4/2025).    It was great speaking with you today.  I value your experience and would be very thankful for your time in providing feedback in our clinic survey. In the next few days, you may receive an email or text message from [employer] with a link to a survey related to  "your  clinical pharmacist.\"     To schedule another MTM appointment, please call the clinic directly or you may call the MTM scheduling line at [phone] or toll-free at [phone].     My Clinical Pharmacist's contact information:                                                      Please feel free to contact me with any questions or concerns you have.      Shae Ortiz, PharmD  Medication Therapy Management Pharmacist  [facility] - Monday and Wednesday 7:30 - 4:00    "

## 2024-11-04 NOTE — PROGRESS NOTES
Medication Therapy Management (MTM) Encounter    ASSESSMENT:                            Medication Adherence/Access: No issues identified.    Type 2 Diabetes:   Patient is meeting A1c goal of < 7%. Self monitoring of blood glucose is at goal fasting readings  and postprandial readings at goal of less than 180.  Will continue medications with no change today as patient is tolerating with no reported episodes of hypoglycemia. Microalbumin is at goal < 30 mg/g, rechecking today. If urine albumin increases to > 30 would recommend ACE or ARB. May need to reduce amlodipine if added or switch amlodipine to ACE or ARB. Patient is not taking aspirin due to age.      PLAN:                            Continue 20 units of Lantus  Call insurance and find out if they allow a vacation override for your upcoming trip.   Call and reschedule with Myesha in June. Schedule with me on the same day back to back.  Provided patient with most recent lab work per request, see AVS.    Follow-up: Return in 7 months (on 6/4/2025).    SUBJECTIVE/OBJECTIVE:                          Linnette Monsivais is a 74 year old female seen for a follow-up visit from 6/26/24. Patient was accompanied by her sister Rosie.      Reason for visit: follow-up diabetes.    Allergies/ADRs: Reviewed in chart  Past Medical History: Reviewed in chart  Tobacco: She reports that she has never smoked. She has never been exposed to tobacco smoke. She has never used smokeless tobacco.  Alcohol: not currently using    Medication Adherence/Access: no issues reported.    Diabetes   Lantus 20 units once daily in the morning - decreased at last MTM visit but she increased on her own with no low readings.  Ozempic 1 mg weekly on Sundays  Aspirin - Not taking due to age    Patient reports no side effects. She was having some constipation but she started eating oatmeal and is now regular. Reports no itching at the injection site.     Medication history: She has taken Januvia  in the past but was stopped when Ozempic was started.    Diet/Exercise: She does feel full faster and eating less. She doesn't feel very hungry. She does eat breakfast and dinner. She is eating fruit for lunch. She is not snacking during the day. Diet is different while in [place]. She tends to graze throughout the day. She is going to [place] Dec 12 th for about 6 months.    Blood sugar monitorin time(s) daily. Ranges (from patient's glucose meter):             From last [facility] visit:                 Eye exam in the last 12 months? No  Foot exam is up to date      Today's Vitals: /69   Pulse 77   Wt 133 lb (60.3 kg)   SpO2 97%   BMI 28.78 kg/m    ----------------      I spent 30 minutes with this patient today. All changes were made via collaborative practice agreement with Myesha Crenshaw PA-C. A copy of the visit note was provided to the patient's provider(s).    A summary of these recommendations was given to the patient.    Shae Ortiz, PharmD  Medication Therapy Management Pharmacist     Medication Therapy Recommendations  Type 2 diabetes mellitus without complication, with long-term current use of insulin (H)   1 Current Medication: insulin glargine (LANTUS SOLOSTAR) 100 UNIT/ML pen (Discontinued)   Current Medication Sig: Inject 15 Units subcutaneously daily.   Rationale: Dose too low - Dosage too low - Effectiveness   Recommendation: Increase Dose - Lantus SoloStar 100 UNIT/ML soln   Status: Accepted per CPA   Identified Date: 2024 Completed Date: 2024

## 2024-11-09 ENCOUNTER — TELEPHONE (OUTPATIENT)
Dept: GASTROENTEROLOGY | Facility: CLINIC | Age: 74
End: 2024-11-09
Payer: COMMERCIAL

## 2024-11-09 DIAGNOSIS — K59.00 CONSTIPATION: Primary | ICD-10-CM

## 2024-11-09 NOTE — TELEPHONE ENCOUNTER
"Endoscopy Scheduling Screen    Have you had any respiratory illness or flu-like symptoms in the last 10 days?  No    What is your communication preference for Instructions and/or Bowel Prep?   Mail/USPS    What insurance is in the chart?  Other:  [facility]    Ordering/Referring Provider:     JESUS KUO      (If ordering provider performs procedure, schedule with ordering provider unless otherwise instructed. )    BMI: Estimated body mass index is 28.78 kg/m  as calculated from the following:    Height as of 10/28/24: 1.448 m (4' 9\").    Weight as of 11/4/24: 60.3 kg (133 lb).     Sedation Ordered  moderate sedation.   If patient BMI > 50 do not schedule in [facility].    If patient BMI > 45 do not schedule at [facility].    Are you taking methadone or Suboxone?  NO, No RN review required.    Have you been diagnosed and are being treated for severe PTSD or severe anxiety?  NO, No RN review required.    Are you taking any prescription medications for pain 3 or more times per week?   NO, No RN review required.    Do you have a history of malignant hyperthermia?  No    (Females) Are you currently pregnant?        Have you been diagnosed or told you have pulmonary hypertension?   No    Do you have an LVAD?  No    Have you been told you have moderate to severe sleep apnea?  No.    Have you been told you have COPD, asthma, or any other lung disease?  No    Do you have any heart conditions?  No     Have you ever had or are you waiting for an organ transplant?  No. Continue scheduling, no site restrictions.    Have you had a stroke or transient ischemic attack (TIA aka \"mini stroke\" in the last 6 months?   No    Have you been diagnosed with or been told you have cirrhosis of the liver?   No.    Are you currently on dialysis?   No    Do you need assistance transferring?   No    BMI: Estimated body mass index is 28.78 kg/m  as calculated from the following:    Height as of 10/28/24: 1.448 m (4' 9\").    Weight as of 11/4/24: 60.3 kg (133 " lb).     Is patients BMI > 40 and scheduling location [facility]?  No    Do you take an injectable or oral medication for weight loss or diabetes (excluding insulin)?  Yes, hold time can be up to 7 days. Please consult with you prescribing provider to discuss endoscopy recommendations. (Please schedule at least 7 days out.)    Do you take the medication Naltrexone?  No    Do you take blood thinners?  No       Prep   Are you currently on dialysis or do you have chronic kidney disease?  No    Do you have a diagnosis of diabetes?  Yes (Golytely Prep)    Do you have a diagnosis of cystic fibrosis (CF)?  No    On a regular basis do you go 3 -5 days between bowel movements?  YES EXTENDED    BMI > 40?  No    Preferred Pharmacy:    [facility] [place] - [address]  [address]  [place] [address]  Phone: [phone] Fax: [phone]      Final Scheduling Details     Procedure scheduled  Colonoscopy    Surgeon:  WALT     Date of procedure:  11/25     Pre-OP / PAC:   No - Not required for this site.    Location  [facility] - Per order.    Sedation   Moderate Sedation - Per order.      Patient Reminders:   You will receive a call from a Nurse to review instructions and health history.  This assessment must be completed prior to your procedure.  Failure to complete the Nurse assessment may result in the procedure being cancelled.      On the day of your procedure, please designate an adult(s) who can drive you home stay with you for the next 24 hours. The medicines used in the exam will make you sleepy. You will not be able to drive.      You cannot take public transportation, ride share services, or non-medical taxi service without a responsible caregiver.  Medical transport services are allowed with the requirement that a responsible caregiver will receive you at your destination.  We require that drivers and caregivers are confirmed prior to your procedure.

## 2024-11-09 NOTE — LETTER
November 11, 2024      Linnette Monsivais  [address]  [place] [address]              Dear Linnette,          Colonoscopy     Procedure date: 11.25.2024    Anticipated arrival time: 10:40 AM   (Please note that arrival times may change)    Facility location: [facility]; [address] [place] [address] Check in location: Main entrance, door #1 on the North side of the building under roundabout awning. DO NOT GO TO SURGERY/ED ENTRANCE.     Important Procedure Reminders:     Prep Instructions:   Instructions on how to prepare for your upcoming procedure are found below. Please read instructions carefully. Deviation from instructions may result in less than desired outcomes and procedure may need to be rescheduled.   If you have additional questions regarding how to prepare for your upcoming procedure, contact our endoscopy pre assessment nurses at [phone] option 2 Monday through Friday 7:00am-5:00pm.      Policy:   The medications used during the procedure will make you sleepy, so you won't be able to drive. On the day of your procedure, please have an adult ready to drive you home and stay with you for the next 6 hours.   You can't use public transportation, ride-share services, or non-medical taxi services without a responsible caregiver. Medical transport services are okay, but a caregiver must be there to receive you at your destination.   Make sure your  and caregiver are confirmed before your procedure.    Day of procedure:  Please keep in mind that arrival times may change. Let your  know there might be a one-hour window for changes.  We ask that you please check in at the  with your . Your  should remain on campus.  Expect to be at the procedure center for about 1.5-2.5 hours.    Please do not wear jewelry (i.e. earrings, rings, necklaces, watches, etc). Leave your purse, billfold, credit cards, and other valuables at home.   Bring  insurance card and ID.     To cancel or reschedule your procedure:   If you need to cancel or reschedule, our endoscopy scheduling team can be reached at [phone], option 2. Monday through Friday, 7:00am-5:00pm.    Medication Reminders:    Please note the following medication holding recommendations:   Injectable diabetic medication(s): Ozempic (Semaglutide). Weekly dosing of medication.  HOLD 7 days before procedure.  Follow up with managing provider.   Insulin.  Consult with your managing provider.     ---------------------------------------------------------------------------------------------------------------------------------------------------------------------------------------------------------------    Preparación extendida para la colonoscopia con Golytely  Jessy estas instrucciones detenidamente al menos 7 días antes del procedimiento de colonoscopia. Asegúrese de seguir todas las indicaciones por completo. El interior del colon debe estar limpio para permitir un examen completo a fin de detectar la presencia de crecimientos, pólipos o anomalías, así marcell la biopsia o la extracción de estos. Se incluyen varios consejos para que esta parte del procedimiento sea lo más cómoda posible.     A partir de ahora:  Un integrante del personal de enfermería le llamará john semana antes del examen para recetarle lo que debe susi para preparar el intestino, revisar las instrucciones de preparación y responder cualquier otra pregunta que tenga.   Usted debe ponerse de acuerdo con un adulto para que lo lleve a fraga casa después del examen. La colonoscopia no se puede realizar a menos que cuente con traslado. Si necesita usar el transporte público, alguien debe acompañarlo y quedarse con usted rosie un mínimo de 6 a 24 horas.  Consulte con fraga aseguradora para cerciorarse de que ellos vayan a pagar el examen. Póngase de acuerdo con un adulto responsable para que lo/la lleve a casa el día del examen. Quien lo/la lleve a  casa no puede ser solamente un servicio de taxi ni un autobús; necesitará a alguien que se quede con usted después del procedimiento.     7 días antes:  Hable con el médico que realizó la indicación: Si carey anticoagulantes (marcell Coumadin, Plavix, Xarelto, Eliquis o Lovenox, entre otros), puede ser que deba interrumpir fraga ingesta de manera temporal, antes del procedimiento. El médico que realizó la indicación le dirá lo que debe hacer.   Si tiene diabetes, deberá solicitar que la dannielle sea temprano por la mañana.  Deje de susi suplementos de fibra, multivitamínicos con marya y todos los medicamentos que contengan marya.  Surta fraga receta de 2 envases de Golytely y 4 comprimidos de Dulcolax en la farmacia.  Es muy importante que se mantenga debidamente hidratado rosie la preparación para la colonoscopia. La solución Golytely para preparar el intestino está diseñada para limpiar el colon, toya no lo mantendrá hidratado. Mientras dure la preparación que se le haya prescrito, también deberá susi 64 oz de Gatorade o de alguna bebida deportiva similar para mantenerse hidratado (evite los colores alexander y púrpura).   Deje de comer maíz, palomitas de maíz, aurelia secos y alimentos con semillas.   Adopte john dieta baja en fibras (consulte los ejemplos a continuación).     2 días antes:  Deje de susi analgésicos antiinflamatorios no esteroideos (NSAID, por amirah siglas en inglés) marcell Advil, Aleve, ibuprofeno, naproxeno o Motrin.  Roslyn líquidos para mantenerse hidratado. [place] es importante. Roslyn por lo menos 4 vasos grandes de agua, Gatorade o de alguna bebida deportiva similar.  Es recomendable aplicarse vaselina en la piel que rodea el ano después de cada evacuación para prevenir irritación. Las toallitas húmedas también ayudan a disminuir la irritación.   Puede comer un desayuno liviano, bajo en fibras. También puede comer un almuerzo liviano, bajo en fibras.  No consuma alimentos sólidos después de la 1 p. m. Pase a john  dieta de líquidos danielle. (consulte la lista a continuación).  A las 4 p. m., tome 2 comprimidos de Dulcolax (bisacodilo).  A las 5 p. m., disuelva en agua el Golytely y roslyn la mitad del envase. Roslyn un vaso de 8 oz de Golytely cada 15 minutos hasta que se termine la mitad del envase. Guarde en la nevera el abdi de Golytely. Permanezca cerca del baño.      1 día antes:  Continúe con la dieta de líquidos danielle solamente (consulte los ejemplos a continuación). No consuma alimentos sólidos paulie día.  No consuma bebidas de color alexander ni púrpura.  A las 4 p. m., tome 2 comprimidos de Dulcolax (bisacodilo).  A las 5 p. m., roslyn la segunda mitad del envase de Golytely. Roslyn un vaso de 8 oz de Golytely cada 10 o 15 minutos hasta que se termine el primer envase de Golytely.   La solución Golytely para preparar el intestino no lo mantendrá hidratado. Tiene que beber de 8 a 10 vasos de líquidos danielle rosie el día.  Antes de irse a dormir, mezcle el david envase de Golytely y guárdelo en la nevera para la mañana siguiente.     El día del procedimiento:  6 horas antes de fraga hora de registro, roslyn un vaso de 8 oz de Golytely cada 15 minutos hasta que se termine la mitad del david envase.  NO DEBE beber el abdi del david envase de Golytely.   Puede susi amirah medicamentos necesarios por la mañana con sorbos de agua.  No fume, no mastique tabaco ni trague nada, ni siquiera agua ni chicle por lo menos 2 horas antes de fraga hora de llegada. Maddie es un asunto de seguridad. El procedimiento podría cancelarse si no sigue las indicaciones.  Llegue con un adulto responsable que pueda llevarlo a casa después del examen y que pueda quedarse con usted rosie un mínimo de 24 horas: Los medicamentos que se usan lo harán sentirse somnoliento y olvidadizo. Si no cuenta con alguien que lo lleve a fraga casa, cancelaremos el procedimiento. Si usa el transporte público, alguien debe acompañarlo.  Realice las nebulizaciones y la terapia para  despejar las vías respiratorias por la mañana, antes del procedimiento (si es necesario).  Si tiene asma, traiga fraga inhalador.      LÍQUIDOS DANIELLE   Puede consumir lo siguiente:  Agua, té, café (sin leche ni crema).  Gaseosa, Gatorade (ni rojos ni púrpura).  Gelatina, palitos de helado (sin leche ni trozos de fruta; ni rojos ni púrpura).  Caldo o consomé sin grasa.  Caramelos duros simples, marcell caramelos danielle con forma de mickey (ni rojos ni púrpura).  Bebidas con sabor a fruta y jugos danielle, marecll jugo de manzana, jugo de uva rebeca, Hi-C y Dank-Aid (ni ritchie ni púrpura).    No consuma lo siguiente:  Leche o productos lácteos, marcell helado, malteadas o batidos, ni crema para café.  Bebidas ritchie o púrpura de ningún tipo, marcell jugo de arándano o jugo de uva. Evite gelatinas, palitos de helado, Dank-Aid, sorbetes y dulces rojos o púrpura.  Jugos con pulpa, marcell jugo de naranja, pomelo, elliott o tomate.  Sopa crema de ningún tipo.  Alcohol y cerveza.  Bebidas de proteínas o polvo de proteínas.          DIETA BAJA EN FIBRAS   Puede consumir lo siguiente:  Almidón: pan rey, panecillos, galletas, cruasanes, tostadas [place], tortillas de harina rebeca, wafles, panqueques, tostadas francesas, arroz rey, fideos, pasta, macarrones, luz elena cocidas y peladas, galletas de agua comunes, galletas saladas, sémola cocida o crema de arroz, arroz inflado, hojuelas de maíz, Rice Krispies, Special K.   Vegetales: cocidos tiernos y enlatados; caldos de vegetales.  Frutas y jugos de fruta: jugo de fruta colado, fruta enlatada sin semillas ni cáscara (no de elliott), puré de manzana, puré de david, bananas maduras, melón (no sandía).   Productos lácteos: leche (común o saborizada), queso, queso cottage, yogur (que no sea de bayas), natillas, helado.   Proteínas: carne molida de res, hoang, ternera, jamón, cerdo, ulises, pavo, pescado o vísceras tiernos y sada cocidos; huevos, mantequilla de maní cremosa.   Grasas y condimentos: margarina,  mantequilla, aceites, mayonesa, crema agria, aderezo para ensaladas, fondo simple de cocción de carne; especias, hierbas cocidas, azúcar, jalea shukri, miel, jarabe.   Bocadillos, dulces y bebidas: pretzels, caramelos duros, pasteles y galletas simples (sin aurelia secos o semillas), gelatina, pudines simples, sorbetes, palitos de helado, café, té, bebidas carbonatadas (con gas). No consuma lo siguiente:  Almidón: panes o panecillos que contengan aurelia secos, semillas o fruta; pan integral de ismael o de otros granos que contenga más de 1 gramo de fibra por rebanada, pan de maíz, tortillas de maíz o de ismael integral, luz elena con cáscara, arroz integral, arroz grant, kasha (ismael sarraceno) y augie.   Vegetales: cualquier vegetal crudo o al vapor, vegetales con semillas, maíz de cualquier forma.   Frutas y jugos de fruta: ciruelas pasas, jugo de ciruela pasa, uvas pasas y otras frutas deshidratadas, bayas y otras frutas con semillas; jugos con pulpa enlatados, marcell jugo de naranja, pomelo, elliott o tomate.  Productos lácteos: cualquier yogur con aurelia secos, semillas o bayas.   Proteínas: kristofer duras, fibrosas con cartílago; frijoles, arvejas o lentejas secos cocidos, mantequilla de maní fernandoujiente.  Grasas y condimentos: pepinillos, aceitunas, relish, rábano picante, mermelada, jalea, conservas.   Bocadillos, dulces y bebidas: palomitas de maíz, aurelia secos, semillas, granola, quang, caramelos hechos con aurelia secos o semillas; todos los postres que contengan aurelia secos, semillas, uvas pasas y otras frutas deshidratadas, quang, granos integrales o salvado.    Preguntas frecuentes:   Cómo sabe si el colon está limpio?   Después de completar la preparación del intestino, amirah deposiciones deben ser líquidas y trinidad. Se verán similares a la orina en el inodoro. Si hay trozos de heces (danielle) en el inodoro o si no puede rashawn el fondo del inodoro, llame a nuestra oficina para obtener asesoramiento. Lan al [phone]  y pida hablar con un miembro del personal de enfermería.    Por qué necesita un conductor responsable que lo lleve a fraga casa y se quede con usted?  Solicitamos que un adulto responsable lo lleve a fraga casa por fraga seguridad. Los medicamentos de sedación que se usan para relajarlo rosie el procedimiento pueden alterar fraga juicio y tiempo de reacción, hacer que se sienta olvidadizo y posiblemente un poco inestable. No conduzca, tome decisiones importantes ni firme documentos legales rosie 24 horas después del procedimiento.   Es normal sentirse hinchado y con gases después del procedimiento. Caminar ayudará a  el aire a través del colon. Puede susi analgésicos sin aspirina que contengan acetaminofén (Tylenol).    Cuándo puede comer después del procedimiento?  Puede retomar fraga alimentación normal cuando se sienta listo, a menos que el médico que realiza el procedimiento le aconseje otra cosa. No mike alcohol rosie 24 horas después del procedimiento.   Puede reanudar amirah actividades normales (trabajo, ejercicios, etc.) después de 24 horas.    Cuándo recibirá los resultados de la prueba?  Debería recibir los resultados del procedimiento y los resultados de laboratorio (si corresponde) por carta, mensaje de MyChart o llamada telefónica dentro de un plazo de 2 semanas. Si tiene alguna pregunta, llame al médico que lo derivó para realizarse el procedimiento.   Nayeli por elegir [facility] para el procedimiento. Si recibe john encuesta sobre fraga atención, tómese el tiempo para completar el cuestionario.  Valoramos amirah comentarios!  La [facility] representa john colaboración entre los médicos de la [facility] y el centro médico de la [facility].

## 2024-11-11 RX ORDER — BISACODYL 5 MG/1
TABLET, DELAYED RELEASE ORAL
Qty: 4 TABLET | Refills: 0 | Status: SHIPPED | OUTPATIENT
Start: 2024-11-11

## 2024-11-11 NOTE — TELEPHONE ENCOUNTER
Extended Golytely Bowel Prep  recommended due to constipation noted or reported.  and GLP-1 agonist medication noted in chart.  Instructions were sent via letter. Bowel prep was sent 11/11/2024 to [facility] [place], [place] - [address] [address].     Caitlin Herrera RN

## 2024-11-13 ENCOUNTER — PATIENT OUTREACH (OUTPATIENT)
Dept: CARE COORDINATION | Facility: CLINIC | Age: 74
End: 2024-11-13
Payer: COMMERCIAL

## 2024-11-13 ENCOUNTER — TELEPHONE (OUTPATIENT)
Dept: GASTROENTEROLOGY | Facility: CLINIC | Age: 74
End: 2024-11-13
Payer: COMMERCIAL

## 2024-11-13 NOTE — TELEPHONE ENCOUNTER
Attempted to contact patient in order to complete pre assessment questions.     No answer. Left message to return call to [phone] option 2    Callback required communication sent via voicemail due to [employer] inactive.      Claudia Luz RN  Endoscopy Procedure Pre Assessment

## 2024-11-13 NOTE — TELEPHONE ENCOUNTER
Pre visit planning completed.      Procedure details:    Patient scheduled for Colonoscopy on 11.25.2024.     Arrival time: 1040. Procedure time 1125    Facility location: [facility]; [address] [facility] [place] [address]. Check in location: Main entrance, door #1 on the North side of the building under roundabout awning. DO NOT GO TO SURGERY/ED ENTRANCE.     Sedation type: Conscious sedation Discuss sedation d/t hx of schizophrenia/psychosis. Per chart pt is followed by psychiatry     Pre op exam needed? No.    Indication for procedure: Screen for colon cancer       Chart review:     Electronic implanted devices? No    Recent diagnosis of diverticulitis within the last 6 weeks? No      Medication review:    Diabetic? Yes. Diabetic injectables: Ozempic (Semaglutide). Weekly dosing of medication.  HOLD 7 days before procedure.  Follow up with managing provider.   Insulin: Consult with managing provider.     Anticoagulants? No    Weight loss medication/injectable? No. Patient is on GLP-1 medication but for DM (see above).    Other medication HOLDING recommendations:  N/A      Prep for procedure:     Bowel prep recommendation: Extended Golytely. Bowel prep prescription sent to [facility] [id number]   Due to: constipation noted or reported. , diabetes. , and GLP-1 agonist medication noted in chart.     Prep instructions sent via letter         Claudia Luz RN  Endoscopy Procedure Pre Assessment   [phone] option 2

## 2024-11-15 NOTE — TELEPHONE ENCOUNTER
Pre assessment completed for upcoming procedure with sister Rosie (consent to communicate on file).  (Please see previous telephone encounter notes for complete details)      Procedure details:    Arrival time and facility location reviewed.    Pre op exam needed? No.    Designated  policy reviewed. Instructed to have someone stay 6  hours post procedure.       Medication review:    Medications reviewed. Please see supporting documentation below. Holding recommendations discussed (if applicable).   Injectable diabetic medication(s): Ozempic (Semaglutide). Weekly dosing of medication.  HOLD 7 days before procedure.  Follow up with managing provider.   Insulin.  Consult with your managing provider.       Prep for procedure:     Procedure prep instructions reviewed.        Any additional information needed:  Patients sister understands the sedation and states patient will do just fine as her mental illness is very well under control.       Family member  verbalized understanding and had no questions or concerns at this time.      Claudia Luz RN  Endoscopy Procedure Pre Assessment   [phone] option 2

## 2024-11-25 ENCOUNTER — HOSPITAL ENCOUNTER (OUTPATIENT)
Facility: CLINIC | Age: 74
Discharge: HOME | End: 2024-11-25
Attending: COLON & RECTAL SURGERY | Admitting: COLON & RECTAL SURGERY
Payer: COMMERCIAL

## 2024-11-25 VITALS
SYSTOLIC BLOOD PRESSURE: 140 MMHG | WEIGHT: 133 LBS | RESPIRATION RATE: 20 BRPM | BODY MASS INDEX: 28.69 KG/M2 | HEART RATE: 77 BPM | HEIGHT: 57 IN | OXYGEN SATURATION: 96 % | DIASTOLIC BLOOD PRESSURE: 70 MMHG

## 2024-11-25 LAB
COLONOSCOPY: NORMAL
GLUCOSE BLDC GLUCOMTR-MCNC: 83 MG/DL (ref 70–99)

## 2024-11-25 PROCEDURE — 88305 TISSUE EXAM BY PATHOLOGIST: CPT | Mod: TC | Performed by: COLON & RECTAL SURGERY

## 2024-11-25 PROCEDURE — G0500 MOD SEDAT ENDO SERVICE >5YRS: HCPCS | Performed by: COLON & RECTAL SURGERY

## 2024-11-25 PROCEDURE — 250N000011 HC RX IP 250 OP 636: Performed by: COLON & RECTAL SURGERY

## 2024-11-25 PROCEDURE — 82962 GLUCOSE BLOOD TEST: CPT

## 2024-11-25 PROCEDURE — 45385 COLONOSCOPY W/LESION REMOVAL: CPT | Performed by: COLON & RECTAL SURGERY

## 2024-11-25 PROCEDURE — 250N000013 HC RX MED GY IP 250 OP 250 PS 637: Performed by: COLON & RECTAL SURGERY

## 2024-11-25 RX ORDER — FLUMAZENIL 0.1 MG/ML
0.2 INJECTION, SOLUTION INTRAVENOUS
Status: DISCONTINUED | OUTPATIENT
Start: 2024-11-25 | End: 2024-11-25 | Stop reason: HOSPADM

## 2024-11-25 RX ORDER — NALOXONE HYDROCHLORIDE 0.4 MG/ML
0.2 INJECTION, SOLUTION INTRAMUSCULAR; INTRAVENOUS; SUBCUTANEOUS
Status: DISCONTINUED | OUTPATIENT
Start: 2024-11-25 | End: 2024-11-25 | Stop reason: HOSPADM

## 2024-11-25 RX ORDER — ONDANSETRON 2 MG/ML
4 INJECTION INTRAMUSCULAR; INTRAVENOUS EVERY 6 HOURS PRN
Status: DISCONTINUED | OUTPATIENT
Start: 2024-11-25 | End: 2024-11-25 | Stop reason: HOSPADM

## 2024-11-25 RX ORDER — EPINEPHRINE 1 MG/ML
0.1 INJECTION, SOLUTION, CONCENTRATE INTRAVENOUS
Status: DISCONTINUED | OUTPATIENT
Start: 2024-11-25 | End: 2024-11-25 | Stop reason: HOSPADM

## 2024-11-25 RX ORDER — SIMETHICONE 40MG/0.6ML
133 SUSPENSION, DROPS(FINAL DOSAGE FORM)(ML) ORAL
Status: COMPLETED | OUTPATIENT
Start: 2024-11-25 | End: 2024-11-25

## 2024-11-25 RX ORDER — ONDANSETRON 2 MG/ML
4 INJECTION INTRAMUSCULAR; INTRAVENOUS
Status: DISCONTINUED | OUTPATIENT
Start: 2024-11-25 | End: 2024-11-25 | Stop reason: HOSPADM

## 2024-11-25 RX ORDER — DIPHENHYDRAMINE HYDROCHLORIDE 50 MG/ML
25-50 INJECTION INTRAMUSCULAR; INTRAVENOUS
Status: DISCONTINUED | OUTPATIENT
Start: 2024-11-25 | End: 2024-11-25 | Stop reason: HOSPADM

## 2024-11-25 RX ORDER — NALOXONE HYDROCHLORIDE 0.4 MG/ML
0.4 INJECTION, SOLUTION INTRAMUSCULAR; INTRAVENOUS; SUBCUTANEOUS
Status: DISCONTINUED | OUTPATIENT
Start: 2024-11-25 | End: 2024-11-25 | Stop reason: HOSPADM

## 2024-11-25 RX ORDER — FENTANYL CITRATE 50 UG/ML
50-100 INJECTION, SOLUTION INTRAMUSCULAR; INTRAVENOUS EVERY 5 MIN PRN
Status: DISCONTINUED | OUTPATIENT
Start: 2024-11-25 | End: 2024-11-25 | Stop reason: HOSPADM

## 2024-11-25 RX ORDER — PROCHLORPERAZINE MALEATE 5 MG/1
5 TABLET ORAL EVERY 6 HOURS PRN
Status: DISCONTINUED | OUTPATIENT
Start: 2024-11-25 | End: 2024-11-25 | Stop reason: HOSPADM

## 2024-11-25 RX ORDER — ATROPINE SULFATE 0.1 MG/ML
1 INJECTION INTRAVENOUS
Status: DISCONTINUED | OUTPATIENT
Start: 2024-11-25 | End: 2024-11-25 | Stop reason: HOSPADM

## 2024-11-25 RX ORDER — LIDOCAINE 40 MG/G
CREAM TOPICAL
Status: DISCONTINUED | OUTPATIENT
Start: 2024-11-25 | End: 2024-11-25 | Stop reason: HOSPADM

## 2024-11-25 RX ORDER — ONDANSETRON 4 MG/1
4 TABLET, ORALLY DISINTEGRATING ORAL EVERY 6 HOURS PRN
Status: DISCONTINUED | OUTPATIENT
Start: 2024-11-25 | End: 2024-11-25 | Stop reason: HOSPADM

## 2024-11-25 RX ADMIN — MIDAZOLAM HYDROCHLORIDE 1 MG: 1 INJECTION, SOLUTION INTRAMUSCULAR; INTRAVENOUS at 07:54

## 2024-11-25 RX ADMIN — SIMETHICONE 133 MG: 20 SUSPENSION/ DROPS ORAL at 08:06

## 2024-11-25 RX ADMIN — FENTANYL CITRATE 100 MCG: 50 INJECTION, SOLUTION INTRAMUSCULAR; INTRAVENOUS at 07:54

## 2024-11-25 ASSESSMENT — ACTIVITIES OF DAILY LIVING (ADL)
ADLS_ACUITY_SCORE: 0
ADLS_ACUITY_SCORE: 0

## 2024-11-25 NOTE — H&P
Pre-Endoscopy History and Physical     Linnette Monsivais MRN# [id number]   YOB: 1950 Age: 74 year old     Date of Procedure: 11/25/2024  Primary care provider: Myesha Crenshaw  Type of Endoscopy: colonoscopy  Reason for Procedure: screening  Type of Anesthesia Anticipated: Moderate Sedation    HPI:    Linnette is a 74 year old female who will be undergoing the above procedure.      A history and physical has been performed. The patient's medications and allergies have been reviewed. The risks and benefits of the procedure and the sedation options and risks were discussed with the patient.  All questions were answered and informed consent was obtained.      She denies a personal or family history of anesthesia complications or bleeding disorders.     No Known Allergies     Prior to Admission Medications   Prescriptions Last Dose Informant Patient Reported? Taking?   Alcohol Swabs (ALCOHOL PREP) 70 % PADS   Yes No   Calcium Carb-Cholecalciferol (CALCIUM 600+D3) 600-800 MG-UNIT TABS 11/22/2024  No Yes   Sig: Take 1 tablet by mouth 2 times daily   Lancets (ONETOUCH DELICA PLUS LYXTAN15V) MISC   No No   Sig: Apply 1 each topically 2 times daily   OLANZapine zydis (ZYPREXA) 5 MG ODT 11/22/2024  Yes Yes   Sig: Place 1 tablet under the tongue daily   OZEMPIC, 1 MG/DOSE, 4 MG/3ML pen 11/17/2024  No Yes   Sig: INJECT 1 MG SUBCUTANEOUSLY EVERY 7 DAYS   amLODIPine (NORVASC) 10 MG tablet 11/23/2024  No Yes   Sig: TAKE ONE TABLET BY MOUTH DAILY   blood glucose (ACCU-CHEK GUIDE) test strip   No No   Sig: Use to test blood sugar 2 times daily or as directed.   blood glucose monitoring (NO BRAND SPECIFIED) meter device kit   No No   Sig: Check blood glucose 2 times daily.   cetirizine (ZYRTEC) 10 MG tablet   No No   Sig: Take 1 tablet (10 mg) by mouth daily   Patient not taking: Reported on 11/4/2024   fluticasone (FLONASE) 50 MCG/ACT nasal spray 11/23/2024  No Yes   Sig: SPRAY 1 SPRAY INTO BOTH NOSTRILS DAILY  "  insulin glargine (LANTUS SOLOSTAR) 100 UNIT/ML pen 11/22/2024  No Yes   Sig: Inject 20 Units subcutaneously daily.   insulin pen needle (32G X 4 MM) 32G X 4 MM miscellaneous   No No   Sig: Single Use.   levothyroxine (SYNTHROID/LEVOTHROID) 88 MCG tablet 11/22/2024  No Yes   Sig: Take 1 tablet (88 mcg) by mouth daily.   rosuvastatin (CRESTOR) 5 MG tablet 11/22/2024  No Yes   Sig: TAKE ONE TABLET BY MOUTH ONCE DAILY      Facility-Administered Medications: None       Patient Active Problem List   Diagnosis    Psychosis (H)    Paranoid schizophrenia, chronic condition with acute exacerbation (H)    Type 2 diabetes mellitus without complication, with long-term current use of insulin (H)    Acquired hypothyroidism    Hyperlipidemia LDL goal <130    Overweight (BMI 25.0-29.9)    Primary hypertension        Past Medical History:   Diagnosis Date    Acquired hypothyroidism     Hyperlipidemia LDL goal <130     Personal history of noncompliance with medical treatment, presenting hazards to health     Type 2 diabetes mellitus without complication, with long-term current use of insulin (H)         Past Surgical History:   Procedure Laterality Date    BACK SURGERY  1987    KIDNEY SURGERY Right        Social History     Tobacco Use    Smoking status: Never     Passive exposure: Never    Smokeless tobacco: Never   Substance Use Topics    Alcohol use: Not Currently       Family History   Problem Relation Age of Onset    Colon Cancer No family hx of        REVIEW OF SYSTEMS:     5 point ROS negative except as noted above in HPI, including Gen., Resp., CV, GI &  system review.      PHYSICAL EXAM:   BP (!) 151/68   Pulse 76   Resp 19   Ht 1.448 m (4' 9\")   Wt 60.3 kg (133 lb)   SpO2 99%   BMI 28.78 kg/m   Estimated body mass index is 28.78 kg/m  as calculated from the following:    Height as of this encounter: 1.448 m (4' 9\").    Weight as of this encounter: 60.3 kg (133 lb).   GENERAL APPEARANCE: healthy and alert  MENTAL " STATUS: alert  AIRWAY EXAM: Mallampatti Class II (visualization of the soft palate, fauces, and uvula)  RESP: lungs clear to auscultation - no rales, rhonchi or wheezes  CV: regular rates and rhythm      DIAGNOSTICS:    Not indicated      IMPRESSION   ASA Class 2 - Mild systemic disease        PLAN:       Plan for colonoscopy. We discussed the risks, benefits and alternatives and the patient wished to proceed.    The above has been forwarded to the consulting provider.      Signed Electronically by: Mary Beth Pulliam MD  November 25, 2024

## 2024-11-26 LAB
PATH REPORT.COMMENTS IMP SPEC: NORMAL
PATH REPORT.COMMENTS IMP SPEC: NORMAL
PATH REPORT.FINAL DX SPEC: NORMAL
PATH REPORT.GROSS SPEC: NORMAL
PATH REPORT.MICROSCOPIC SPEC OTHER STN: NORMAL
PATH REPORT.RELEVANT HX SPEC: NORMAL
PHOTO IMAGE: NORMAL

## 2024-11-26 PROCEDURE — 88305 TISSUE EXAM BY PATHOLOGIST: CPT | Mod: 26 | Performed by: PATHOLOGY

## 2024-12-09 ENCOUNTER — LAB (OUTPATIENT)
Dept: LAB | Facility: CLINIC | Age: 74
End: 2024-12-09
Payer: COMMERCIAL

## 2024-12-09 DIAGNOSIS — E03.9 ACQUIRED HYPOTHYROIDISM: ICD-10-CM

## 2024-12-09 PROBLEM — D12.6 ADENOMATOUS COLON POLYP: Status: ACTIVE | Noted: 2024-12-09

## 2024-12-09 LAB — TSH SERPL DL<=0.005 MIU/L-ACNC: 1.4 UIU/ML (ref 0.3–4.2)

## 2024-12-09 PROCEDURE — 36415 COLL VENOUS BLD VENIPUNCTURE: CPT

## 2024-12-09 PROCEDURE — 84443 ASSAY THYROID STIM HORMONE: CPT

## 2024-12-09 RX ORDER — LEVOTHYROXINE SODIUM 88 UG/1
88 TABLET ORAL DAILY
Qty: 90 TABLET | Refills: 3 | Status: SHIPPED | OUTPATIENT
Start: 2024-12-09

## 2025-04-14 DIAGNOSIS — E11.9 TYPE 2 DIABETES MELLITUS WITHOUT COMPLICATION, WITH LONG-TERM CURRENT USE OF INSULIN (H): ICD-10-CM

## 2025-04-14 DIAGNOSIS — Z79.4 TYPE 2 DIABETES MELLITUS WITHOUT COMPLICATION, WITH LONG-TERM CURRENT USE OF INSULIN (H): ICD-10-CM

## 2025-04-14 DIAGNOSIS — E78.5 HYPERLIPIDEMIA LDL GOAL <130: ICD-10-CM

## 2025-04-14 RX ORDER — SEMAGLUTIDE 1.34 MG/ML
INJECTION, SOLUTION SUBCUTANEOUS
Qty: 9 ML | Refills: 1 | Status: SHIPPED | OUTPATIENT
Start: 2025-04-14

## 2025-04-14 RX ORDER — ROSUVASTATIN CALCIUM 5 MG/1
5 TABLET, COATED ORAL DAILY
Qty: 90 TABLET | Refills: 1 | Status: SHIPPED | OUTPATIENT
Start: 2025-04-14

## 2025-04-15 NOTE — TELEPHONE ENCOUNTER
Medication passed protocol, however, refill RN could not approve because  the prescription has   Provider, please approve or deny the prescription.

## 2025-05-22 ENCOUNTER — TELEPHONE (OUTPATIENT)
Dept: FAMILY MEDICINE | Facility: CLINIC | Age: 75
End: 2025-05-22
Payer: COMMERCIAL

## 2025-05-22 NOTE — TELEPHONE ENCOUNTER
Patient Quality Outreach    Patient is due for the following:   Diabetes -  Eye Exam  Physical Annual Wellness Visit    Action(s) Taken:   Schedule a Annual Wellness Visit    Type of outreach:    Sent [employer] message.    Questions for provider review:    None         Patricia Galloway MA  Chart routed to None.

## 2025-08-26 ENCOUNTER — PATIENT OUTREACH (OUTPATIENT)
Dept: CARE COORDINATION | Facility: CLINIC | Age: 75
End: 2025-08-26
Payer: COMMERCIAL

## (undated) DEVICE — ENDO SNARE POLYPECTOMY OVAL 15MM LOOP SD-240U-15

## (undated) DEVICE — KIT ENDO TURNOVER/PROCEDURE W/CLEAN A SCOPE LINERS 103888

## (undated) DEVICE — ESU GROUND PAD E7506

## (undated) DEVICE — ENDO TRAP POLYP QUICK CATCH 710201

## (undated) RX ORDER — SIMETHICONE 40MG/0.6ML
SUSPENSION, DROPS(FINAL DOSAGE FORM)(ML) ORAL
Status: DISPENSED
Start: 2024-11-25

## (undated) RX ORDER — FENTANYL CITRATE 50 UG/ML
INJECTION, SOLUTION INTRAMUSCULAR; INTRAVENOUS
Status: DISPENSED
Start: 2024-11-25